# Patient Record
Sex: FEMALE | Race: WHITE | NOT HISPANIC OR LATINO | Employment: FULL TIME | URBAN - NONMETROPOLITAN AREA
[De-identification: names, ages, dates, MRNs, and addresses within clinical notes are randomized per-mention and may not be internally consistent; named-entity substitution may affect disease eponyms.]

---

## 2017-01-08 ENCOUNTER — HISTORY (OUTPATIENT)
Dept: FAMILY MEDICINE | Facility: OTHER | Age: 37
End: 2017-01-08

## 2017-01-08 ENCOUNTER — OFFICE VISIT - GICH (OUTPATIENT)
Dept: FAMILY MEDICINE | Facility: OTHER | Age: 37
End: 2017-01-08

## 2017-01-08 DIAGNOSIS — J06.9 ACUTE UPPER RESPIRATORY INFECTION: ICD-10-CM

## 2017-01-30 ENCOUNTER — COMMUNICATION - GICH (OUTPATIENT)
Dept: FAMILY MEDICINE | Facility: OTHER | Age: 37
End: 2017-01-30

## 2017-01-30 DIAGNOSIS — F41.1 GENERALIZED ANXIETY DISORDER: ICD-10-CM

## 2017-01-30 DIAGNOSIS — F33.0 MAJOR DEPRESSIVE DISORDER, RECURRENT, MILD (H): ICD-10-CM

## 2017-02-25 ENCOUNTER — COMMUNICATION - GICH (OUTPATIENT)
Dept: FAMILY MEDICINE | Facility: OTHER | Age: 37
End: 2017-02-25

## 2017-02-25 DIAGNOSIS — F41.1 GENERALIZED ANXIETY DISORDER: ICD-10-CM

## 2017-02-26 ENCOUNTER — COMMUNICATION - GICH (OUTPATIENT)
Dept: FAMILY MEDICINE | Facility: OTHER | Age: 37
End: 2017-02-26

## 2017-02-26 DIAGNOSIS — F41.1 GENERALIZED ANXIETY DISORDER: ICD-10-CM

## 2017-03-19 ENCOUNTER — HOSPITAL ENCOUNTER (OUTPATIENT)
Dept: RADIOLOGY | Facility: OTHER | Age: 37
End: 2017-03-19
Attending: NURSE PRACTITIONER

## 2017-03-19 ENCOUNTER — HISTORY (OUTPATIENT)
Dept: FAMILY MEDICINE | Facility: OTHER | Age: 37
End: 2017-03-19

## 2017-03-19 ENCOUNTER — OFFICE VISIT - GICH (OUTPATIENT)
Dept: FAMILY MEDICINE | Facility: OTHER | Age: 37
End: 2017-03-19

## 2017-03-19 DIAGNOSIS — S69.92XA UNSPECIFIED INJURY OF LEFT WRIST, HAND AND FINGER(S), INITIAL ENCOUNTER: ICD-10-CM

## 2017-03-19 DIAGNOSIS — S60.222A CONTUSION OF LEFT HAND: ICD-10-CM

## 2017-03-23 ENCOUNTER — COMMUNICATION - GICH (OUTPATIENT)
Dept: FAMILY MEDICINE | Facility: OTHER | Age: 37
End: 2017-03-23

## 2017-03-23 DIAGNOSIS — M17.0 PRIMARY OSTEOARTHRITIS OF BOTH KNEES: ICD-10-CM

## 2017-03-25 ENCOUNTER — COMMUNICATION - GICH (OUTPATIENT)
Dept: FAMILY MEDICINE | Facility: OTHER | Age: 37
End: 2017-03-25

## 2017-03-25 DIAGNOSIS — J30.89 OTHER ALLERGIC RHINITIS: ICD-10-CM

## 2017-03-31 ENCOUNTER — HISTORY (OUTPATIENT)
Dept: FAMILY MEDICINE | Facility: OTHER | Age: 37
End: 2017-03-31

## 2017-03-31 ENCOUNTER — OFFICE VISIT - GICH (OUTPATIENT)
Dept: FAMILY MEDICINE | Facility: OTHER | Age: 37
End: 2017-03-31

## 2017-03-31 DIAGNOSIS — E03.8 OTHER SPECIFIED HYPOTHYROIDISM: ICD-10-CM

## 2017-03-31 DIAGNOSIS — M17.0 PRIMARY OSTEOARTHRITIS OF BOTH KNEES: ICD-10-CM

## 2017-03-31 DIAGNOSIS — I10 ESSENTIAL (PRIMARY) HYPERTENSION: ICD-10-CM

## 2017-03-31 DIAGNOSIS — F41.1 GENERALIZED ANXIETY DISORDER: ICD-10-CM

## 2017-03-31 LAB
A/G RATIO - HISTORICAL: 1.7 (ref 1–2)
ALBUMIN SERPL-MCNC: 4.1 G/DL (ref 3.5–5.7)
ALP SERPL-CCNC: 96 IU/L (ref 34–104)
ALT (SGPT) - HISTORICAL: 14 IU/L (ref 7–52)
ANION GAP - HISTORICAL: 10 (ref 5–18)
AST SERPL-CCNC: 16 IU/L (ref 13–39)
BILIRUB SERPL-MCNC: 0.5 MG/DL (ref 0.3–1)
BUN SERPL-MCNC: 11 MG/DL (ref 7–25)
BUN/CREAT RATIO - HISTORICAL: 18
CALCIUM SERPL-MCNC: 9 MG/DL (ref 8.6–10.3)
CHLORIDE SERPLBLD-SCNC: 103 MMOL/L (ref 98–107)
CO2 SERPL-SCNC: 24 MMOL/L (ref 21–31)
CREAT SERPL-MCNC: 0.62 MG/DL (ref 0.7–1.3)
GFR IF NOT AFRICAN AMERICAN - HISTORICAL: >60 ML/MIN/1.73M2
GLOBULIN - HISTORICAL: 2.4 G/DL (ref 2–3.7)
GLUCOSE SERPL-MCNC: 93 MG/DL (ref 70–105)
POTASSIUM SERPL-SCNC: 4.2 MMOL/L (ref 3.5–5.1)
PROT SERPL-MCNC: 6.5 G/DL (ref 6.4–8.9)
SODIUM SERPL-SCNC: 137 MMOL/L (ref 133–143)
TSH - HISTORICAL: 1.52 UIU/ML (ref 0.34–5.6)

## 2017-03-31 ASSESSMENT — ANXIETY QUESTIONNAIRES
1. FEELING NERVOUS, ANXIOUS, OR ON EDGE: NEARLY EVERY DAY
4. TROUBLE RELAXING: NEARLY EVERY DAY
3. WORRYING TOO MUCH ABOUT DIFFERENT THINGS: NEARLY EVERY DAY
5. BEING SO RESTLESS THAT IT IS HARD TO SIT STILL: NEARLY EVERY DAY
7. FEELING AFRAID AS IF SOMETHING AWFUL MIGHT HAPPEN: NEARLY EVERY DAY
2. NOT BEING ABLE TO STOP OR CONTROL WORRYING: NEARLY EVERY DAY
6. BECOMING EASILY ANNOYED OR IRRITABLE: NEARLY EVERY DAY
GAD7 TOTAL SCORE: 21

## 2017-04-04 ENCOUNTER — COMMUNICATION - GICH (OUTPATIENT)
Dept: FAMILY MEDICINE | Facility: OTHER | Age: 37
End: 2017-04-04

## 2017-04-07 ENCOUNTER — COMMUNICATION - GICH (OUTPATIENT)
Dept: FAMILY MEDICINE | Facility: OTHER | Age: 37
End: 2017-04-07

## 2017-04-09 ENCOUNTER — COMMUNICATION - GICH (OUTPATIENT)
Dept: FAMILY MEDICINE | Facility: OTHER | Age: 37
End: 2017-04-09

## 2017-04-09 DIAGNOSIS — F41.1 GENERALIZED ANXIETY DISORDER: ICD-10-CM

## 2017-04-09 DIAGNOSIS — F33.0 MAJOR DEPRESSIVE DISORDER, RECURRENT, MILD (H): ICD-10-CM

## 2017-04-17 ENCOUNTER — HISTORY (OUTPATIENT)
Dept: FAMILY MEDICINE | Facility: OTHER | Age: 37
End: 2017-04-17

## 2017-04-17 ENCOUNTER — OFFICE VISIT - GICH (OUTPATIENT)
Dept: FAMILY MEDICINE | Facility: OTHER | Age: 37
End: 2017-04-17

## 2017-04-17 ENCOUNTER — HOSPITAL ENCOUNTER (OUTPATIENT)
Dept: RADIOLOGY | Facility: OTHER | Age: 37
End: 2017-04-17
Attending: NURSE PRACTITIONER

## 2017-04-17 ENCOUNTER — COMMUNICATION - GICH (OUTPATIENT)
Dept: FAMILY MEDICINE | Facility: OTHER | Age: 37
End: 2017-04-17

## 2017-04-17 DIAGNOSIS — R07.81 PLEURODYNIA: ICD-10-CM

## 2017-04-17 DIAGNOSIS — S20.219A CONTUSION OF FRONT WALL OF THORAX: ICD-10-CM

## 2017-05-10 ENCOUNTER — COMMUNICATION - GICH (OUTPATIENT)
Dept: FAMILY MEDICINE | Facility: OTHER | Age: 37
End: 2017-05-10

## 2017-05-10 DIAGNOSIS — E03.9 HYPOTHYROIDISM: ICD-10-CM

## 2017-06-08 ENCOUNTER — AMBULATORY - GICH (OUTPATIENT)
Dept: FAMILY MEDICINE | Facility: OTHER | Age: 37
End: 2017-06-08

## 2017-06-08 DIAGNOSIS — Z11.1 ENCOUNTER FOR SCREENING FOR RESPIRATORY TUBERCULOSIS: ICD-10-CM

## 2017-06-29 ENCOUNTER — COMMUNICATION - GICH (OUTPATIENT)
Dept: FAMILY MEDICINE | Facility: OTHER | Age: 37
End: 2017-06-29

## 2017-06-29 DIAGNOSIS — F33.0 MAJOR DEPRESSIVE DISORDER, RECURRENT, MILD (H): ICD-10-CM

## 2017-06-29 DIAGNOSIS — F41.1 GENERALIZED ANXIETY DISORDER: ICD-10-CM

## 2017-07-21 ENCOUNTER — HISTORY (OUTPATIENT)
Dept: FAMILY MEDICINE | Facility: OTHER | Age: 37
End: 2017-07-21

## 2017-07-21 ENCOUNTER — OFFICE VISIT - GICH (OUTPATIENT)
Dept: FAMILY MEDICINE | Facility: OTHER | Age: 37
End: 2017-07-21

## 2017-07-21 DIAGNOSIS — F33.0 MAJOR DEPRESSIVE DISORDER, RECURRENT, MILD (H): ICD-10-CM

## 2017-07-21 DIAGNOSIS — F41.1 GENERALIZED ANXIETY DISORDER: ICD-10-CM

## 2017-07-21 DIAGNOSIS — L71.9 ROSACEA: ICD-10-CM

## 2017-07-21 ASSESSMENT — ANXIETY QUESTIONNAIRES
7. FEELING AFRAID AS IF SOMETHING AWFUL MIGHT HAPPEN: SEVERAL DAYS
5. BEING SO RESTLESS THAT IT IS HARD TO SIT STILL: NOT AT ALL
2. NOT BEING ABLE TO STOP OR CONTROL WORRYING: SEVERAL DAYS
1. FEELING NERVOUS, ANXIOUS, OR ON EDGE: SEVERAL DAYS
6. BECOMING EASILY ANNOYED OR IRRITABLE: MORE THAN HALF THE DAYS
3. WORRYING TOO MUCH ABOUT DIFFERENT THINGS: SEVERAL DAYS
4. TROUBLE RELAXING: SEVERAL DAYS
GAD7 TOTAL SCORE: 7

## 2017-09-15 ENCOUNTER — COMMUNICATION - GICH (OUTPATIENT)
Dept: FAMILY MEDICINE | Facility: OTHER | Age: 37
End: 2017-09-15

## 2017-09-15 DIAGNOSIS — F41.1 GENERALIZED ANXIETY DISORDER: ICD-10-CM

## 2017-09-19 ENCOUNTER — COMMUNICATION - GICH (OUTPATIENT)
Dept: FAMILY MEDICINE | Facility: OTHER | Age: 37
End: 2017-09-19

## 2017-09-19 DIAGNOSIS — M17.0 PRIMARY OSTEOARTHRITIS OF BOTH KNEES: ICD-10-CM

## 2017-09-20 ENCOUNTER — COMMUNICATION - GICH (OUTPATIENT)
Dept: FAMILY MEDICINE | Facility: OTHER | Age: 37
End: 2017-09-20

## 2017-09-20 DIAGNOSIS — I10 ESSENTIAL (PRIMARY) HYPERTENSION: ICD-10-CM

## 2017-09-27 ENCOUNTER — COMMUNICATION - GICH (OUTPATIENT)
Dept: FAMILY MEDICINE | Facility: OTHER | Age: 37
End: 2017-09-27

## 2017-09-27 DIAGNOSIS — R09.89 OTHER SPECIFIED SYMPTOMS AND SIGNS INVOLVING THE CIRCULATORY AND RESPIRATORY SYSTEMS: ICD-10-CM

## 2017-09-27 DIAGNOSIS — M17.0 PRIMARY OSTEOARTHRITIS OF BOTH KNEES: ICD-10-CM

## 2017-10-02 ENCOUNTER — COMMUNICATION - GICH (OUTPATIENT)
Dept: FAMILY MEDICINE | Facility: OTHER | Age: 37
End: 2017-10-02

## 2017-10-13 ENCOUNTER — HOSPITAL ENCOUNTER (OUTPATIENT)
Dept: RADIOLOGY | Facility: OTHER | Age: 37
End: 2017-10-13
Attending: FAMILY MEDICINE

## 2017-10-13 ENCOUNTER — HISTORY (OUTPATIENT)
Dept: FAMILY MEDICINE | Facility: OTHER | Age: 37
End: 2017-10-13

## 2017-10-13 ENCOUNTER — OFFICE VISIT - GICH (OUTPATIENT)
Dept: FAMILY MEDICINE | Facility: OTHER | Age: 37
End: 2017-10-13

## 2017-10-13 DIAGNOSIS — M54.16 RADICULOPATHY OF LUMBAR REGION: ICD-10-CM

## 2017-10-13 DIAGNOSIS — M17.0 PRIMARY OSTEOARTHRITIS OF BOTH KNEES: ICD-10-CM

## 2017-10-13 DIAGNOSIS — F41.1 GENERALIZED ANXIETY DISORDER: ICD-10-CM

## 2017-10-16 ENCOUNTER — HOSPITAL ENCOUNTER (OUTPATIENT)
Dept: RADIOLOGY | Facility: OTHER | Age: 37
End: 2017-10-16
Attending: FAMILY MEDICINE

## 2017-10-16 ENCOUNTER — COMMUNICATION - GICH (OUTPATIENT)
Dept: FAMILY MEDICINE | Facility: OTHER | Age: 37
End: 2017-10-16

## 2017-10-16 DIAGNOSIS — M54.16 RADICULOPATHY OF LUMBAR REGION: ICD-10-CM

## 2017-10-24 ENCOUNTER — COMMUNICATION - GICH (OUTPATIENT)
Dept: FAMILY MEDICINE | Facility: OTHER | Age: 37
End: 2017-10-24

## 2017-11-13 ENCOUNTER — OFFICE VISIT - GICH (OUTPATIENT)
Dept: FAMILY MEDICINE | Facility: OTHER | Age: 37
End: 2017-11-13

## 2017-11-13 ENCOUNTER — HISTORY (OUTPATIENT)
Dept: FAMILY MEDICINE | Facility: OTHER | Age: 37
End: 2017-11-13

## 2017-11-13 DIAGNOSIS — J20.9 ACUTE BRONCHITIS: ICD-10-CM

## 2017-11-13 ASSESSMENT — ANXIETY QUESTIONNAIRES
GAD7 TOTAL SCORE: 7
5. BEING SO RESTLESS THAT IT IS HARD TO SIT STILL: NOT AT ALL
3. WORRYING TOO MUCH ABOUT DIFFERENT THINGS: SEVERAL DAYS
6. BECOMING EASILY ANNOYED OR IRRITABLE: MORE THAN HALF THE DAYS
2. NOT BEING ABLE TO STOP OR CONTROL WORRYING: SEVERAL DAYS
7. FEELING AFRAID AS IF SOMETHING AWFUL MIGHT HAPPEN: SEVERAL DAYS
1. FEELING NERVOUS, ANXIOUS, OR ON EDGE: SEVERAL DAYS
4. TROUBLE RELAXING: SEVERAL DAYS

## 2017-11-13 ASSESSMENT — PATIENT HEALTH QUESTIONNAIRE - PHQ9: SUM OF ALL RESPONSES TO PHQ QUESTIONS 1-9: 7

## 2017-11-16 ENCOUNTER — COMMUNICATION - GICH (OUTPATIENT)
Dept: FAMILY MEDICINE | Facility: OTHER | Age: 37
End: 2017-11-16

## 2017-11-16 DIAGNOSIS — E53.8 DEFICIENCY OF OTHER SPECIFIED B GROUP VITAMINS (CODE): ICD-10-CM

## 2017-12-22 ENCOUNTER — COMMUNICATION - GICH (OUTPATIENT)
Dept: FAMILY MEDICINE | Facility: OTHER | Age: 37
End: 2017-12-22

## 2017-12-27 NOTE — PROGRESS NOTES
Patient Information     Patient Name MRN Laverne Harrison [id number] Female 1980      Progress Notes by Alin Pimentel MD at 10/13/2017  2:30 PM     Author:  Alin Pimentel MD Service:  (none) Author Type:  Physician     Filed:  10/13/2017  3:38 PM Encounter Date:  10/13/2017 Status:  Signed     :  Alin Pimentel MD (Physician)            SUBJECTIVE:    Laverne Nguyen is a 37 y.o. female who presents for follow up back pain and anxiety    HPI    Pain is severe in lumbar spine today.  For the last 6 months she has had intermittent numbness in her right foot with flexion of spine.  Pinching in right buttock today.  Lifting now in her job, and more pain.  Cannot sleep some nights due to the back pain.  Sleeping on the couch a lot more.  Uses the ultram daily.  Usually 2 int he AM and one in the evening.  Feels this is not enough.  Has had to take just one daily until today, as she was running out.  Is upset she had to come in for the refill.  Has never had an MRI of her back.  Last x-ray was .  Feels like I abandoned her since I did not refill the medications.  In looking over the chart, last 6 month prescription with me was 3/31/17, so was not due until 2 weeks ago.    Lots of stress over the summer.  Grandma .  Step daughter was in 3 different foster homes.  Now in her 4th.  Arguing more with her  over her cutting herself and damaging her home.  He did not believe that the daughter needed more help.      Allergies      Allergen   Reactions     Amoxicillin  Other - Describe In Comment Field     Doesn't work for patient     ,   Current Outpatient Prescriptions on File Prior to Visit       Medication  Sig Dispense Refill     cetirizine (ZYRTEC) 10 mg tablet TAKE 1 TABLET BY MOUTH TWICE DAILY. 180 tablet 1     cholecalciferol (VITAMIN D-3) 2,000 unit capsule 1 tablet daily  0     clonazePAM (KLONOPIN) 1 mg tablet Take 1 tablet by mouth at bedtime. 90 tablet 1     cyanocobalamin  "(VITAMIN B12) 1,000 mcg/mL injection Inject 1 mL intramuscular every 4 weeks. 1 mL 11     EPINEPHrine (EPIPEN) 0.3 mg/0.3 mL injection Inject 0.3 mg intramuscular one time if needed for Allergic Reaction for 1 dose. 2 Each 3     escitalopram oxalate (LEXAPRO) 20 mg tablet Take 1 tablet by mouth once daily. 90 tablet 3     levothyroxine (SYNTHROID) 88 mcg tablet TAKE 1 TABLET BY MOUTH ONCE DAILY. 90 tablet 2     lisinopril-hydrochlorothiazide 20-12.5 mg tablet (PRINZIDE) TAKE 1 TABLET BY MOUTH EVERY DAY 90 tablet 1     metroNIDAZOLE (METROGEL) 1 % gel Apply  topically to affected area(s) once daily. 60 g 3     montelukast (SINGULAIR) 10 mg tablet TAKE 1 TABLET BY MOUTH AT BEDTIME 90 tablet 2     multivitamin (MVI) tablet Take 1 tablet by mouth once daily.  0     Syringe with Needle, Disp, (MEDSAVER SYRINGE 3CC/25GX1) 3 mL 25 x 1\" Syrg As directed. 1 Syringe 11     traZODone (DESYREL) 100 mg tablet Take 1 tablet by mouth at bedtime. 90 tablet 3     No current facility-administered medications on file prior to visit.    ,   Past Medical History:     Diagnosis  Date     Cannabis abuse      use found on urine drug screen      Chronic low back pain      Depression with anxiety      ear infections     Recurrent      Gastroesophageal reflux disease      Healthcare maintenance      History of pregnancy      I, para 1.      Hypertension      Morbid obesity (HC) 07    with a body mass index of 51      Tobacco abuse     and   Past Surgical History:      Procedure  Laterality Date     ADENOIDECTOMY       Bilateral tympanostomy       Gastric bypass procedure  07     for obesity, Dr. Hogue, [place]       LEG/ANKLE SURGERY Right      [facility] COLONOSCOPY REMOVE DOMI POLYP LESN HOT BPSY FORCE  1/10/2014    hyperplastic       [facility] CREATE EARDRUM OPENING GEN ANESTH  10/86     [facility] CREATE EARDRUM OPENING GEN ANESTH       Right tympanoplasty       with left PE tube       TUBAL LIGATION   "       REVIEW OF SYSTEMS:  Review of Systems   Constitutional: Negative for chills and fever.   Musculoskeletal: Positive for back pain and joint pain.   Neurological: Positive for headaches.   Endo/Heme/Allergies: Does not bruise/bleed easily.   Psychiatric/Behavioral: Positive for depression. Negative for substance abuse. The patient is nervous/anxious.        OBJECTIVE:  /84  Pulse 80  Wt 119.3 kg (263 lb)  BMI 43.77 kg/m2    EXAM:   Physical Exam   Constitutional: She is oriented to person, place, and time.   Tearful and confrontational at first, but then normalized as I asked more questions.   HENT:   Head: Normocephalic and atraumatic.   Musculoskeletal:   Normal gait and station.  Able to easily get on the exam table.  No lumbar pain on palpitation.  Neg straight leg raise.  Normal lower extremity strength.     Neurological: She is alert and oriented to person, place, and time.   Psychiatric: Memory, affect and judgment normal.     X-ray shows mild facet degeneration at L5/S1    ASSESSMENT/PLAN:    ICD-10-CM    1. Chronic lumbar radiculopathy M54.16 XR SPINE LUMBAR 3 VIEWS      MR SPINE LUMBAR WO   2. Generalized anxiety disorder F41.1    3. Primary osteoarthritis of both knees M17.0 traMADol (ULTRAM) 50 mg tablet        Plan:  Has had back pain now for years, but reporting radicular symptoms so an MRI is indicated.  Discussed with her indications for surgery, primarily michael weakness, she currently would not be expected to need surgery.  Pain management goals would be weight loss, activity, core strengthening.      Regarding her anxiety, I advised she resume counseling, but also offered her a different SSIR.  She does not want to risk the new one being less effective than her lexapro.  Follow up with me on anxiety as needed.  6 months on the back.    Alin Pimentel MD ....................  10/13/2017   3:37 PM

## 2017-12-27 NOTE — PROGRESS NOTES
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  Contact their Primary Care Provider for further instructions.     Patient History  Previous TB: no  Previous positive PPD: no  History of 'live' vaccines such as MMRV or Proquad (Measles, Mumps, Rubella, Varicella), Shingles (Zostavax), Intranasal flu (FLumist, LAIV), Rotavirus (Rotateq) in the last 4 weeks: no  HIV or immunocompromised: no  Significant weight loss (10 lbs or greater) in the past 6 months: no  Currently or recently experiencing night sweats: no  Currently experiencing a persistent cough: no Pt will have read by her employer at [employer]    The patient has been informed to return to this clinic within 48-72 hours to read PPD.  Above information obtained by: Filomena VELÁSQUEZ

## 2017-12-28 NOTE — TELEPHONE ENCOUNTER
Patient Information     Patient Name MRN Laverne Harrison [id number] Female 1980      Telephone Encounter by Luz Marina Ross RN at 10/2/2017 11:26 AM     Author:  Luz Marina Ross RN Service:  (none) Author Type:  NURS- Registered Nurse     Filed:  10/2/2017 11:31 AM Encounter Date:  10/2/2017 Status:  Signed     :  Luz Marina Ross RN (NURS- Registered Nurse)            I called pt (Before I saw Sveta had called) to inform her of 's notes.  She used expletives stating she has to have two knee replacements and she isn't able to take anything else.  I tried to explain that the diagnosis and medication need to be addressed and she hung up on me.  Luz Marina Ross RN ....................  10/2/2017   11:30 AM

## 2017-12-28 NOTE — TELEPHONE ENCOUNTER
Patient Information     Patient Name MRN Laverne Harrison [id number] Female 1980      Telephone Encounter by Chayito Clifton RN at 7/3/2017  2:26 PM     Author:  Chayito Clifton RN Service:  (none) Author Type:  NURS- Registered Nurse     Filed:  7/3/2017  2:33 PM Encounter Date:  2017 Status:  Signed     :  Chayito Clifton RN (NURS- Registered Nurse)            Patient was to follow up in April. Limited refill and letter/MyChart message sent for reminder.    Depression-in adults 18 and over  SSRI    Office visit in the past 12 months or as indicated in chart.  Should have clinic visit 1-2 months after initial prescription.    Last visit with JOSE BERUMEN was on: 2017 in [facility]  Next visit with JOSE BERUMEN is on: No future appointment listed with this provider  Next visit with Family Practice is on: No future appointment listed in this department    Max refills 12 months from last office visit or per providers notes.    PHQ Depression Screening 3/31/2017 2017   Date of PHQ exam (doc flow) 3/31/2017 -   1. Lack of interest/pleasure 0 - Not at all 0 - Not at all   2. Feeling down/depressed 0 - Not at all 0 - Not at all   PHQ-2 TOTAL SCORE 0 0   3. Trouble sleeping - -   4. Decreased energy - -   5. Appetite change - -   6. Feelings of failure - -   7. Trouble concentrating - -   8. Activity level - -   9. Hurting yourself - -   PHQ-9 TOTAL SCORE - -   PHQ-9 Severity Level - -   Functional Impairment - -      TERESA-7 ANXIETY SCREENING 2016 3/31/2017   TERESA date (doc flow) 2016 3/31/2017   Nervous, anxious 2 3   Cannot stop worrying 2 3   Worry about different things 2 3   Cannot relax 2 3   Feeling restless 2 3   Easily annoyed/irritated 3 3   Afraid of awful event 2 3   Score 15 21   Severity severe anxiety severe anxiety       Prescription refilled per RN Medication Refill Policy.................... Chayito Clifton RN  ....................  7/3/2017   2:30 PM

## 2017-12-28 NOTE — TELEPHONE ENCOUNTER
Patient Information     Patient Name MRN Laverne Harrison [id number] Female 1980      Telephone Encounter by Vince Chew at 10/16/2017  9:18 AM     Author:  Vince Chew Service:  (none) Author Type:  (none)     Filed:  10/16/2017  9:27 AM Encounter Date:  10/16/2017 Status:  Signed     :  Vince Chew            After birth date was verified, spoke with Laverne and let her know that her prescription for Tramadol had been faxed in. Patient was very upset, as she stated that she went the entire weekend without her Tramadol and is now in so much pain that she can hardly tolerate it. Patient requesting medication for breakthrough pain.    If applicable, please prescribe a pain medication. [facility]acy is [facility] ....................  10/16/2017   9:24 AM

## 2017-12-28 NOTE — ADDENDUM NOTE
Patient Information     Patient Name MRN Laverne Harrison [id number] Female 1980      Addendum Note by Alin Pimentel MD at 2017  3:46 PM     Author:  Alin Pimentel MD Service:  (none) Author Type:  Physician     Filed:  2017  3:46 PM Encounter Date:  2017 Status:  Signed     :  Alin Pimentel MD (Physician)       Addended by: ALIN PIMENTEL on: 2017 03:46 PM        Modules accepted: Orders

## 2017-12-28 NOTE — TELEPHONE ENCOUNTER
Patient Information     Patient Name MRN Laverne Harrison [id number] Female 1980      Telephone Encounter by Sveta Melendez at 10/2/2017 10:56 AM     Author:  Sveta Melendez Service:  (none) Author Type:  (none)     Filed:  10/2/2017 11:21 AM Encounter Date:  10/2/2017 Status:  Signed     :  Sveta Melendez            Notified patient of providers note. She is very upset that she was just in on  and this medication was not refilled. She told me that Dr. Pimentel WILL BE refilling half of the prescription because no doctor should do this to a patient. She states that she will be switching doctors. I let her know that I will get this message to Dr. Pimentel and she hung up on me. She was very demanding and rude.  Sveta Siu ....................  10/2/2017   11:00 AM

## 2017-12-28 NOTE — TELEPHONE ENCOUNTER
Patient Information     Patient Name MRN Laverne Harrison [id number] Female 1980      Telephone Encounter by Luz Marina Ross RN at 2017  9:00 AM     Author:  Luz Marina Ross RN Service:  (none) Author Type:  NURS- Registered Nurse     Filed:  2017  9:05 AM Encounter Date:  2017 Status:  Signed     :  Luz Marina Ross RN (NURS- Registered Nurse)            Unable to fill RX today.  Can be filled .    This is a Refill request from: [facility]  Name of Medication: Tramadol  Quantity requested: 90  Last fill date: 2017 #90 R5  Due for refill:   Last visit with ALIN PIMENTEL was on: 2017 in [facility]  PCP:  Alin Pimentel MD  Controlled Substance Agreement: 2016  Diagnosis r/t this medication request: osteoarthritis      Unable to complete prescription refill per RN Medication Refill Policy.................... Luz Marina Ross RN ....................  2017   9:00 AM

## 2017-12-28 NOTE — TELEPHONE ENCOUNTER
Patient Information     Patient Name MRN Laverne Harrison [id number] Female 1980      Telephone Encounter by Luz Marina Ross RN at 2017  3:45 PM     Author:  Luz Marina Ross RN Service:  (none) Author Type:  NURS- Registered Nurse     Filed:  2017  4:12 PM Encounter Date:  2017 Status:  Signed     :  Luz Marina Ross RN (NURS- Registered Nurse)            This is a Refill request from: [facility]  Name of Medication: Tramadol  Quantity requested: 90  Last fill date: 2017  Due for refill: yes  Last visit with ALIN PIMENTEL was on: 2017 in [facility]  PCP:  Alin Pimentel MD  Controlled Substance Agreement: 2017  Diagnosis r/t this medication request: Osteoarthritis      Unable to complete prescription refill per RN Medication Refill Policy.................... Luz Marina Ross RN ....................  2017   4:09 PM    Asthma/COPD    Office visit in the past 12 months.    Last visit with AILN PIMENTEL was on: 2017 in [facility]  Next visit with ALIN PIMENTEL is on: No future appointment listed with this provider  Next visit with Family Practice is on: No future appointment listed in this department    Max refills 12 months from last office visit.

## 2017-12-28 NOTE — PROGRESS NOTES
Patient Information     Patient Name MRN Sex Laverne Jeff [id number] Female 1980      Progress Notes by Ange Funes PA-C at 2017  2:15 PM     Author:  Ange Funes PA-C Service:  (none) Author Type:  PHYS- Physician Assistant     Filed:  2017 10:22 AM Encounter Date:  2017 Status:  Signed     :  Ange Funes PA-C (PHYS- Physician Assistant)            Nursing Notes:   Cristina Luna  2017  2:32 PM  Signed  Patient presents to the clinic for cough and chest congestion that she states that she has had for five days now.  Cristina Luna LPN........................2017  2:17 PM      HPI:    Laverne Nguyen is a 37 y.o. female who presents for cough, chest congestion x 5 weeks. Tx with sinus infection a few weeks ago at [facility]. Not getting better. Drained. Worse last 5 days. Some phlegm. Chest congestion - radiates to back with coughing. Tired. Head is full. Was put on augmentin.  Not sleeping well. No fevers or chills. No ear pain or sore throat. Congestion. No GI symptoms. Eating and drinking normally.    Past Medical History:     Diagnosis  Date     Cannabis abuse      use found on urine drug screen      Chronic low back pain      Depression with anxiety      ear infections     Recurrent      Gastroesophageal reflux disease      Healthcare maintenance      History of pregnancy      I, para 1.      Hypertension      Morbid obesity (HC) 07    with a body mass index of 51      Tobacco abuse        Past Surgical History:      Procedure  Laterality Date     ADENOIDECTOMY       Bilateral tympanostomy       Gastric bypass procedure  07     for obesity, [place] [place]       LEG/ANKLE SURGERY Right      [facility] COLONOSCOPY REMOVE DOMI POLYP LESN HOT BPSY FORCE  1/10/2014    hyperplastic       [place] CREATE EARDRUM OPENING GEN ANESTH  10/86     [place] CREATE EARDRUM OPENING GEN ANESTH       Right tympanoplasty       with  "left PE tube       TUBAL LIGATION  2009       Social History        Substance Use Topics          Smoking status:   Current Some Day Smoker      Packs/day:  0.25      Types:  Cigarettes      Smokeless tobacco:   Never Used       Comment: 5 CIGARETTES A WEEK        Alcohol use   Yes      Comment: occasionally 2/month         Current Outpatient Prescriptions       Medication  Sig Dispense Refill     busPIRone (BUSPAR) 10 mg tablet TK 1 T PO TID PRF ANXIETY.  11     cetirizine (ZYRTEC) 10 mg tablet TAKE 1 TABLET BY MOUTH TWICE DAILY. 180 tablet 1     cholecalciferol (VITAMIN D-3) 2,000 unit capsule 1 tablet daily  0     clonazePAM (KLONOPIN) 1 mg tablet Take 1 tablet by mouth at bedtime. 90 tablet 1     cyanocobalamin (VITAMIN B12) 1,000 mcg/mL injection Inject 1 mL intramuscular every 4 weeks. 1 mL 11     EPINEPHrine (EPIPEN) 0.3 mg/0.3 mL injection Inject 0.3 mg intramuscular one time if needed for Allergic Reaction for 1 dose. 2 Each 3     escitalopram oxalate (LEXAPRO) 20 mg tablet Take 1 tablet by mouth once daily. 90 tablet 3     levothyroxine (SYNTHROID) 88 mcg tablet TAKE 1 TABLET BY MOUTH ONCE DAILY. 90 tablet 2     lisinopril-hydrochlorothiazide 20-12.5 mg tablet (PRINZIDE) TAKE 1 TABLET BY MOUTH EVERY DAY 90 tablet 1     metroNIDAZOLE (METROGEL) 1 % gel Apply  topically to affected area(s) once daily. 60 g 3     montelukast (SINGULAIR) 10 mg tablet TAKE 1 TABLET BY MOUTH AT BEDTIME 90 tablet 2     multivitamin (MVI) tablet Take 1 tablet by mouth once daily.  0     nicotine (COMMIT) 2 mg lozenge TK 1 LOZENGE PO PRF TOBACCO CESSATION  0     Syringe with Needle, Disp, (MEDSAVER SYRINGE 3CC/25GX1) 3 mL 25 x 1\" Syrg As directed. 1 Syringe 11     traMADol (ULTRAM) 50 mg tablet Take 1 tablet by mouth 3 times daily if needed. 90 tablet 5     traZODone (DESYREL) 100 mg tablet Take 1 tablet by mouth at bedtime. 90 tablet 3     No current facility-administered medications for this visit.      Medications have been " "reviewed by me and are current to the best of my knowledge and ability.      Allergies      Allergen   Reactions     Amoxicillin  Other - Describe In Comment Field     Doesn't work for patient         REVIEW OF SYSTEMS:  Refer to HPI.    EXAM:   Vitals:    /70  Pulse 72  Temp 98.4  F (36.9  C) (Tympanic)  Ht 1.657 m (5' 5.25\")  Wt 122.9 kg (271 lb)  LMP 10/25/2017  SpO2 97%  Breastfeeding? No  BMI 44.75 kg/m2    General Appearance: Pleasant, alert, appropriate appearance for age. No acute distress  Ear Exam: Normal TM's bilaterally, grey, translucent, bony landmarks appreciated.   Left/Right TM: Effusion is not present. TM is not bulging. There is no pus appreciated.    Normal auditory canals and external ears. Non-tender.   OroPharynx Exam:  Erythematous posterior pharynx with no exudates. No sinus pain upon palpation of frontal, ethmoid, and maxillary sinuses.  Chest/Respiratory Exam: Normal chest wall and respirations. Clear to auscultation. No retractions appreciated.  Cardiovascular Exam: Regular rate and rhythm. S1, S2, no murmur, click, gallop, or rubs.  Lymphatic Exam: ACLN.  Skin: no rash or abnormalities  Psychiatric Exam: Alert and oriented - appropriate affect.    PHQ Depression Screen  Date of PHQ exam: 17  Over the last 2 weeks, how often have you been bothered by any of the following problems?  1. Little interest or pleasure in doing things: 1 - Several days  2. Feeling down, depressed, or hopeless: 1 - Several days  3. Trouble falling or staying asleep, or sleeping too much: 2 - More than half the days  4. Feeling tired or having little energy: 2 - More than half the days  5. Poor appetite or overeatin - Not at all  6. Feeling bad about yourself - or that you are a failure or have let yourself or your family down: 1 - Several days  7. Trouble concentrating on things, such as reading the newspaper or watching television: 0 - Not at all  8. Moving or speaking so slowly that " other people could have noticed. Or the opposite - being so fidgety or restless that you have been moving around a lot more than usual: 0 - Not at all  9. Thoughts that you would be better off dead, or of hurting yourself in some way: 0 - Not at all    PHQ-9 TOTAL SCORE: 7  Depression Severity Level: mild  If any answers were positive, how difficult have these problems made it for you to do your work, take care of things at home, or get along with other people: somewhat difficult    LABS:    No results found for this visit on 11/13/17.    ASSESSMENT AND PLAN:      ICD-10-CM    1. Acute bronchitis, unspecified organism J20.9 azithromycin (ZITHROMAX) 250 mg tablet      codeine-guaiFENesin (CHERATUSSIN AC)  mg/5 mL liquid       Patient was started on azithromycin for acute bronchitis. Gave Cheratussin cough syrup for coughing symptoms.  Return for recheck in the next 1-2 weeks if symptoms are not resolving or worsening as needed. You need to complete a chest x-ray if symptoms are not starting to resolve to rule out concerns.    Patient Instructions   Antibiotic has been sent to pharmacy. Please take full course of antibiotic even if symptoms have completely resolved. This helps prevent against antibiotic resistance.     Patient prescribed antibiotics. Monitor for any fevers or chills. Return in 7-10 days if not feeling better. Please call clinic with any questions or concerns. Please take in a lot of fluids and get rest. Return with any change or worsening of symptoms.    May use symptomatic care with tylenol or ibuprofen. Sudafed or mucinex work well for congestion. May use cough syrup or cough drops.  Using a humidifier works well to break up the congestion. You can also sleep propped up on a couple pillows to decrease symptoms at night. A nettipot works well to decrease nasal congestion.    Use a Neti Pot/sinus flush (Phil Med Sinus Rinse) 3 times daily to irrigate sinuses/mucosal tissue.     Sudafed or  mucinex work well for congestion.   If you choose pseudoephedrine, use for only 5-7 days AS DIRECTED. Speak to your pharmacist if you have any concerns about your medications. May also use decongestant nasal spray, but only for 3 days MAXIMUM.    You will need to be evaluated if you start to experience:  Fever higher than 102.5 F (39.2 C)   Sudden and severe pain in the face and head   Trouble seeing or seeing double   Trouble thinking clearly   Swelling or redness around 1 or both eyes   Trouble breathing or a stiff neck    * If you are a smoker, try to quit *    Call 9-1-1 or go to the emergency room if you:  Have trouble breathing   Are drooling because you cannot swallow your saliva   Have swelling of the neck or tongue   Cannot move your neck or have trouble opening your mouth        Ange Funes PA-C..................11/13/2017 2:32 PM

## 2017-12-28 NOTE — TELEPHONE ENCOUNTER
Patient Information     Patient Name MRN Laverne Harrison [id number] Female 1980      Telephone Encounter by Alin Pimentel MD at 10/24/2017  4:34 PM     Author:  Alin Pimentel MD Service:  (none) Author Type:  Physician     Filed:  10/24/2017  4:34 PM Encounter Date:  10/24/2017 Status:  Signed     :  Alin Pimentel MD (Physician)            Actual complete report is on her MyChart. I released this 2 weeks ago.  Let her know she has arthritis changes throughout.  Can try injections of the facets if she wants.  Alin Pimentel MD ....................  10/24/2017   4:34 PM

## 2017-12-28 NOTE — TELEPHONE ENCOUNTER
Patient Information     Patient Name MRN Laverne Harrison [id number] Female 1980      Telephone Encounter by Alin Pimentel MD at 10/2/2017  8:03 AM     Author:  Alin Pimentel MD Service:  (none) Author Type:  Physician     Filed:  10/2/2017  8:03 AM Encounter Date:  2017 Status:  Signed     :  Alin Pimentel MD (Physician)            This needs follow up every 6 months.  Alin Pimentel MD ....................  10/2/2017   8:03 AM

## 2017-12-28 NOTE — PROGRESS NOTES
"Patient Information     Patient Name MRN Laverne Harrison [id number] Female 1980      Progress Notes by Alin Pimentel MD at 2017  3:00 PM     Author:  Alin Pimentel MD Service:  (none) Author Type:  Physician     Filed:  2017  3:43 PM Encounter Date:  2017 Status:  Signed     :  Alin Pimentel MD (Physician)            SUBJECTIVE:    Laverne Nguyen is a 37 y.o. female who presents for med follow up     HPI    She is now working at [employer], no longer at [employer] and has significant less anxiety and depression with the change.   No significant med sdie effects and wants a refill.    Also a red rash around her nose.  Small red lumps. Tender off and on.  Has tried triple antibiotics, vaseline.      Allergies      Allergen   Reactions     Amoxicillin  Other - Describe In Comment Field     Doesn't work for patient     ,   Current Outpatient Prescriptions on File Prior to Visit       Medication  Sig Dispense Refill     cetirizine (ZYRTEC) 10 mg tablet TAKE 1 TABLET BY MOUTH TWICE DAILY. 180 tablet 1     cholecalciferol (VITAMIN D-3) 2,000 unit capsule 1 tablet daily  0     clonazePAM (KLONOPIN) 1 mg tablet TAKE 1 TABLET BY MOUTH AT BEDTIME 90 tablet 1     cyanocobalamin (VITAMIN B12) 1,000 mcg/mL injection Inject 1 mL intramuscular every 4 weeks. 1 mL 11     EPINEPHrine (EPIPEN) 0.3 mg/0.3 mL injection Inject 0.3 mg intramuscular one time if needed for Allergic Reaction for 1 dose. 2 Each 3     levothyroxine (SYNTHROID) 88 mcg tablet TAKE 1 TABLET BY MOUTH ONCE DAILY. 90 tablet 2     lisinopril-hydrochlorothiazide 20-12.5 mg tablet (PRINIZIDE) TAKE 1 TABLET BY MOUTH EVERY DAY 90 tablet 3     montelukast (SINGULAIR) 10 mg tablet TAKE 1 TABLET BY MOUTH AT BEDTIME 90 tablet 2     multivitamin (MVI) tablet Take 1 tablet by mouth once daily.  0     Syringe with Needle, Disp, (MEDSAVER SYRINGE 3CC/25GX1) 3 mL 25 x 1\" Syrg As directed. 1 Syringe 11     traMADol (ULTRAM) 50 mg " tablet Take 1 tablet by mouth 3 times daily if needed. 90 tablet 5     traZODone (DESYREL) 100 mg tablet Take 1 tablet by mouth at bedtime. 90 tablet 3     No current facility-administered medications on file prior to visit.    ,   Past Medical History:     Diagnosis  Date     Cannabis abuse      use found on urine drug screen      Chronic low back pain      Depression with anxiety      ear infections     Recurrent      Gastroesophageal reflux disease      Healthcare maintenance      History of pregnancy      I, para 1.      Hypertension 2004     Morbid obesity (HC) 07    with a body mass index of 51      Tobacco abuse     and   Past Surgical History:      Procedure  Laterality Date     ADENOIDECTOMY       Bilateral tympanostomy       Gastric bypass procedure  07     for obesity, [facility] [place]       LEG/ANKLE SURGERY Right      MN COLONOSCOPY REMOVE DOMI POLYP LESN HOT BPSY FORCEP  1/10/2014    hyperplastic       MN CREATE EARDRUM OPENING GEN ANESTH  10/86     MN CREATE EARDRUM OPENING GEN ANESTH       Right tympanoplasty       with left PE tube       TUBAL LIGATION         REVIEW OF SYSTEMS:  Review of Systems   Constitutional: Negative for weight loss.   Respiratory: Negative for shortness of breath.    Cardiovascular: Negative for chest pain.   Skin: Positive for rash. Negative for itching.   Neurological: Positive for headaches.   Psychiatric/Behavioral: The patient is not nervous/anxious and does not have insomnia.        OBJECTIVE:  /70  Resp 16  Wt 116.7 kg (257 lb 3.2 oz)  BMI 42.8 kg/m2    EXAM:   Physical Exam   Constitutional: She is oriented to person, place, and time and well-developed, well-nourished, and in no distress. No distress.   HENT:   Head: Normocephalic and atraumatic.   Right Ear: External ear normal.   Left Ear: External ear normal.   Neurological: She is alert and oriented to person, place, and time.   Skin: She is not  diaphoretic.   Red patch under nose into nasolabial folds with several small papules within the red area.   Psychiatric: Memory, affect and judgment normal.       PHQ Depression Screening 4/17/2017 7/21/2017   Date of PHQ exam (doc flow) - 7/21/2017   1. Lack of interest/pleasure 0 - Not at all 0 - Not at all   2. Feeling down/depressed 0 - Not at all 0 - Not at all   PHQ-2 TOTAL SCORE 0 0   3. Trouble sleeping - -   4. Decreased energy - -   5. Appetite change - -   6. Feelings of failure - -   7. Trouble concentrating - -   8. Activity level - -   9. Hurting yourself - -   PHQ-9 TOTAL SCORE - -   PHQ-9 Severity Level - -   Functional Impairment - -   Some recent data might be hidden       TERESA-7 ANXIETY SCREENING 3/31/2017 7/21/2017   TERESA date (doc flow) 3/31/2017 7/21/2017   Nervous, anxious 3 1   Cannot stop worrying 3 1   Worry about different things 3 1   Cannot relax 3 1   Feeling restless 3 0   Easily annoyed/irritated 3 2   Afraid of awful event 3 1   Score 21 7   Severity severe anxiety mild anxiety   Some recent data might be hidden           ASSESSMENT/PLAN:    ICD-10-CM    1. Generalized anxiety disorder F41.1 escitalopram oxalate (LEXAPRO) 20 mg tablet      DISCONTINUED: escitalopram oxalate (LEXAPRO) 20 mg tablet   2. Major depressive disorder, recurrent, mild (HC) F33.0 escitalopram oxalate (LEXAPRO) 20 mg tablet      DISCONTINUED: escitalopram oxalate (LEXAPRO) 20 mg tablet   3. Rosacea L71.9 metroNIDAZOLE (METROGEL) 1 % gel        Plan:  I refilled her lexapro, which appears to be working really well for her.  Regarding the rash, I want her to use the topical metronidazole along with daily acne face wash.  25/30 minutes counseling today about the anxiety and depression.    Alin Pimentel MD ....................  7/21/2017   3:42 PM

## 2017-12-28 NOTE — TELEPHONE ENCOUNTER
Patient Information     Patient Name MRN Laverne Harrison [id number] Female 1980      Telephone Encounter by Alin Pimentel MD at 10/2/2017 10:29 AM     Author:  Alin Pimentel MD Service:  (none) Author Type:  Physician     Filed:  10/2/2017 10:30 AM Encounter Date:  10/2/2017 Status:  Signed     :  Alin Pimentel MD (Physician)            Correct, the tramdol was not addressed and it is on a 6 month refill schedule.    Alin Pimentel MD ....................  10/2/2017   10:29 AM

## 2017-12-28 NOTE — TELEPHONE ENCOUNTER
Patient Information     Patient Name MRN Laverne Harrison [id number] Female 1980      Telephone Encounter by Yadira Warner at 10/16/2017  9:43 AM     Author:  Yadira Warner Service:  (none) Author Type:  (none)     Filed:  10/16/2017  9:44 AM Encounter Date:  10/16/2017 Status:  Signed     :  Yadira Warner            Called [facility] and the Rx was in the  Process of being done  Yadira Warner ....................  10/16/2017   9:44 AM

## 2017-12-28 NOTE — TELEPHONE ENCOUNTER
Patient Information     Patient Name MRN Sex Laverne Jeff [id number] Female 1980      Telephone Encounter by Conchita Perrin RN at 2017  3:23 PM     Author:  Conchita Perrin RN Service:  (none) Author Type:  NURS- Registered Nurse     Filed:  2017  3:25 PM Encounter Date:  2017 Status:  Signed     :  Conchita Perrin RN (NURS- Registered Nurse)            Office visit in the past 12 months or per provider note.    Last visit with JOSE BERUMEN was on: 10/13/2017 in [facility]  Next visit with JOSE BERUMEN is on: No future appointment listed with this provider  Next visit with Family Practice is on: No future appointment listed in this department    Max refill for 12 months from last office visit or per provider note.    FU in 6 mos per LOV. Prescription refilled per RN Medication Refill Policy.................... CONCHITA PERRIN RN ....................  2017   3:24 PM

## 2017-12-28 NOTE — TELEPHONE ENCOUNTER
Patient Information     Patient Name MRN Sex Laverne Jeff [id number] Female 1980      Telephone Encounter by Yadira aWrner at 10/24/2017  4:41 PM     Author:  Yadira Warner Service:  (none) Author Type:  (none)     Filed:  10/24/2017  4:41 PM Encounter Date:  10/24/2017 Status:  Signed     :  Yadira Warner            Called Pt  Yadira Warner ....................  10/24/2017   4:41 PM

## 2017-12-28 NOTE — TELEPHONE ENCOUNTER
Patient Information     Patient Name MRN Laverne Harrison [id number] Female 1980      Telephone Encounter by Yadira Warner at 10/16/2017  9:09 AM     Author:  Yadira Warner Service:  (none) Author Type:  (none)     Filed:  10/16/2017  9:10 AM Encounter Date:  10/16/2017 Status:  Signed     :  Yadira Warner            Faxed over the medication for Tramadol, [facility] and also called in. Please call Pt to conform that Rx is there  Yadira Warner ....................  10/16/2017   9:10 AM

## 2017-12-28 NOTE — PATIENT INSTRUCTIONS
Patient Information     Patient Name MRN Laverne Harrison [id number] Female 1980      Patient Instructions by Ange Funes PA-C at 2017  2:15 PM     Author:  Ange Funes PA-C Service:  (none) Author Type:  PHYS- Physician Assistant     Filed:  2017  2:37 PM Encounter Date:  2017 Status:  Signed     :  Ange Funes PA-C (PHYS- Physician Assistant)            Antibiotic has been sent to pharmacy. Please take full course of antibiotic even if symptoms have completely resolved. This helps prevent against antibiotic resistance.     Patient prescribed antibiotics. Monitor for any fevers or chills. Return in 7-10 days if not feeling better. Please call clinic with any questions or concerns. Please take in a lot of fluids and get rest. Return with any change or worsening of symptoms.    May use symptomatic care with tylenol or ibuprofen. Sudafed or mucinex work well for congestion. May use cough syrup or cough drops.  Using a humidifier works well to break up the congestion. You can also sleep propped up on a couple pillows to decrease symptoms at night. A nettipot works well to decrease nasal congestion.    Use a Neti Pot/sinus flush (Phil Med Sinus Rinse) 3 times daily to irrigate sinuses/mucosal tissue.     Sudafed or mucinex work well for congestion.   If you choose pseudoephedrine, use for only 5-7 days AS DIRECTED. Speak to your pharmacist if you have any concerns about your medications. May also use decongestant nasal spray, but only for 3 days MAXIMUM.    You will need to be evaluated if you start to experience:  Fever higher than 102.5 F (39.2 C)   Sudden and severe pain in the face and head   Trouble seeing or seeing double   Trouble thinking clearly   Swelling or redness around 1 or both eyes   Trouble breathing or a stiff neck    * If you are a smoker, try to quit *    Call  or go to the emergency room if you:  Have trouble breathing   Are drooling  because you cannot swallow your saliva   Have swelling of the neck or tongue   Cannot move your neck or have trouble opening your mouth

## 2017-12-28 NOTE — TELEPHONE ENCOUNTER
Patient Information     Patient Name MRN Sex Laverne Jeff [id number] Female 1980      Telephone Encounter by Conchita Perrin RN at 2017  9:17 AM     Author:  Conchita Perrin RN Service:  (none) Author Type:  NURS- Registered Nurse     Filed:  2017  9:18 AM Encounter Date:  2017 Status:  Signed     :  Conchita Perrin RN (NURS- Registered Nurse)            Diuretic Combinations    Office visit in the past 12 months or per provider note.    Last visit with JOSE BERUMEN was on: 2017 in [facility]  Next visit with JOSE BERUMEN is on: No future appointment listed with this provider  Next visit with Family Practice is on: No future appointment listed in this department    Lab test requirements:  Creatinine and Potassium annually, if ordering lab, order BMP.  CREATININE (mg/dL)    Date Value   2017 0.62 (L)     POTASSIUM (mmol/L)    Date Value   2017 4.2       Max refill for 12 months from last office visit or per provider note.    Prescription refilled per RN Medication Refill Policy.................... CONCHITA PERRIN RN ....................  2017   9:17 AM

## 2017-12-28 NOTE — TELEPHONE ENCOUNTER
Patient Information     Patient Name MRN Laverne Harrison [id number] Female 1980      Telephone Encounter by Alin Pimentel MD at 10/16/2017  9:35 AM     Author:  Alin Pimentel MD Service:  (none) Author Type:  Physician     Filed:  10/16/2017  9:36 AM Encounter Date:  10/16/2017 Status:  Signed     :  Alin Pimentel MD (Physician)            We faxed it in last Friday.  I do not understand why she has not been able to get it.  Please investigate further.  Alin Pimentel MD ....................  10/16/2017   9:36 AM

## 2017-12-28 NOTE — TELEPHONE ENCOUNTER
Patient Information     Patient Name MRN Laverne Harrison [id number] Female 1980      Telephone Encounter by Valorie aCstorena RN at 2017 10:47 AM     Author:  Valorie Castorena RN Service:  (none) Author Type:  NURS- Registered Nurse     Filed:  2017 10:49 AM Encounter Date:  9/15/2017 Status:  Signed     :  Valorie Castorena RN (NURS- Registered Nurse)            Redundant Refill Request refused.    Medication:clonazePAM (KLONOPIN) 1 mg tablet    Qty:90 tablet   Ref:1  Start:2017   End:              Route:Oral                  MELISSA:No   Class:Print    Sig:Take 1 tablet by mouth at bedtime.    Pharmacy:[facility] [address] [place] [address]   [address]              OF [facility] [address] - [phone]    Unable to complete prescription refill per RN Medication Refill Policy.................... Valorie Castorena RN ....................  2017   10:48 AM

## 2017-12-28 NOTE — PROGRESS NOTES
Patient Information     Patient Name MRN Sex Laverne Jeff [id number] Female 1980      Progress Notes by Ange Montalvo at 10/16/2017  2:31 PM     Author:  Ange Montalvo Service:  (none) Author Type:  Other Clinical Staff     Filed:  10/16/2017  2:31 PM Date of Service:  10/16/2017  2:31 PM Status:  Signed     :  Ange Montalvo (Other Clinical Staff)            Falls Risk Criteria:    Age 65 and older or under age 4        Sensory deficits    Poor vision    Use of ambulatory aides    Impaired judgment    Unable to walk independently    Meets High Risk criteria for falls:  no

## 2017-12-29 NOTE — PATIENT INSTRUCTIONS
Patient Information     Patient Name MRN Laverne Harrison [id number] Female 1980      Patient Instructions by Alin Pimentel MD at 2017  3:00 PM     Author:  Alin Pimentel MD Service:  (none) Author Type:  Physician     Filed:  2017  3:46 PM Encounter Date:  2017 Status:  Signed     :  Alin Pimentel MD (Physician)            CBT insomnia

## 2017-12-30 NOTE — NURSING NOTE
Patient Information     Patient Name MRN Laverne Harrison [id number] Female 1980      Nursing Note by Cristina Luna at 2017  2:15 PM     Author:  Cristina Luna Service:  (none) Author Type:  (none)     Filed:  2017  2:32 PM Encounter Date:  2017 Status:  Signed     :  Cristina Luna            Patient presents to the clinic for cough and chest congestion that she states that she has had for five days now.  Cristina Luna LPN........................2017  2:17 PM

## 2017-12-30 NOTE — NURSING NOTE
Patient Information     Patient Name MRN Laverne Harrison [id number] Female 1980      Nursing Note by Yadira Warner at 2017  3:00 PM     Author:  Yadira Warner Service:  (none) Author Type:  (none)     Filed:  2017  3:26 PM Encounter Date:  2017 Status:  Signed     :  Yadira Warner            Coming in for a medication check,refillm Lexapro  Yadira Warner ....................  2017   3:02 PM

## 2018-01-02 NOTE — NURSING NOTE
Patient Information     Patient Name MRN Laverne Harrison [id number] Female 1980      Nursing Note by Selene Nice LPN at 2017  9:30 AM     Author:  Selene Nice LPN Service:  (none) Author Type:  NURS- Licensed Practical Nurse     Filed:  2017  9:58 AM Encounter Date:  2017 Status:  Signed     :  Selene Nice LPN (NURS- Licensed Practical Nurse)            The patient is here today with complaints of cough, sweats, chills and ear aches.  Selene Nice LPN......2017  9:50 AM

## 2018-01-02 NOTE — PROGRESS NOTES
Patient Information     Patient Name MRN Sex Laverne Jeff [id number] Female 1980      Progress Notes by Shanti Moses PA-C at 2017  9:30 AM     Author:  Shanti Moses PA-C Service:  (none) Author Type:  PHYS- Physician Assistant     Filed:  2017  3:30 PM Encounter Date:  2017 Status:  Signed     :  Shanti Moses PA-C (PHYS- Physician Assistant)            SUBJECTIVE:    Laverne Ngueyn is a 36 y.o. female who presents for cough.  HPI Comments: Laverne is here with complaints of 4 days of nasal congestion and cough.  She has little pieces of green mucus that she coughs up.  Sinuses are painful, maxillary and frontal.   She has ear pain off and on.  Feels congestion in the chest but coughs up very little.      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 90 tablet 0     cetirizine (ZYRTEC) 10 mg tablet Take 1 tablet by mouth once daily. 90 tablet 3     cholecalciferol (VITAMIN D-3) 2,000 unit capsule 1 tablet daily  0     clonazePAM (KLONOPIN) 1 mg tablet TAKE 1 TABLET BY MOUTH EVERY NIGHT AT BEDTIME. 30 tablet 4     cyanocobalamin (VITAMIN B12) 1,000 mcg/mL injection Inject 1 mL intramuscular every 4 weeks. 1 mL 11     EPINEPHrine (EPIPEN) 0.3 mg/0.3 mL injection Inject 0.3 mg  "intramuscular one time if needed for Allergic Reaction for 1 dose. 2 Each 3     escitalopram oxalate (LEXAPRO) 20 mg tablet Take 1 tablet by mouth once daily. 90 tablet 0     hydrOXYzine pamoate (VISTARIL) 25 mg capsule Take 1 capsule by mouth every 8 hours if needed for Anxiety. Do not mix with Klonopin 30 capsule 0     levothyroxine (SYNTHROID) 88 mcg tablet TAKE 1 TABLET BY MOUTH ONCE DAILY. 90 tablet 1     lisinopril-hydrochlorothiazide 20-12.5 mg tablet (PRINIZIDE) TAKE 1 TABLET BY MOUTH EVERY DAY 90 tablet 3     montelukast (SINGULAIR) 10 mg tablet TAKE 1 TABLET BY MOUTH AT BEDTIME 90 tablet 2     multivitamin (MVI) tablet Take 1 tablet by mouth once daily.  0     Syringe with Needle, Disp, (MEDSAVER SYRINGE 3CC/25GX1) 3 mL 25 x 1\" Syrg As directed. 1 Syringe 11     traMADol (ULTRAM) 50 mg tablet Take 1 tablet by mouth 3 times daily if needed. 90 tablet 5     traZODone (DESYREL) 100 mg tablet Take 1 tablet by mouth at bedtime. 90 tablet 0     No current facility-administered medications on file prior to visit.      Allergies      Allergen   Reactions     Amoxicillin  Other - Describe In Comment Field     Doesn't work for patient       Social history:  Smoker.  Needs to quit.  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     REVIEW OF SYSTEMS:  Review of Systems   Constitutional: Positive for chills, fever and malaise/fatigue.        Subjective fevers.     HENT:        Sinus headaches.     Respiratory: Positive for cough and sputum production. Negative for shortness of breath and wheezing.         Felt short of breath yesterday, not today.     Cardiovascular: Negative for chest pain and palpitations.   Gastrointestinal: Negative for abdominal pain, nausea and vomiting.   Neurological: Positive for headaches.       OBJECTIVE:  /74  Pulse 84  Temp 99.3  F (37.4  C) (Tympanic)  Ht 1.651 m (5' 5\")  Wt 118.4 kg (261 lb)  LMP 12/23/2016  SpO2 99%  Breastfeeding? No  BMI 43.43 kg/m2    EXAM:   Physical Exam "   Constitutional: She is well-developed, well-nourished, and in no distress.   She looks miserable but nontoxic.   HENT:   Mouth/Throat: Oropharynx is clear and moist. No oropharyngeal exudate.   TMs intact and gray with minimal serous fluid. Nose congested and inflammed. Maxillary sinuses tender to palpation bilaterally.   Eyes: Conjunctivae are normal. Pupils are equal, round, and reactive to light. Right eye exhibits no discharge. Left eye exhibits no discharge.   Neck: Normal range of motion.   Cardiovascular: Normal rate, regular rhythm and normal heart sounds.    No murmur heard.  Pulmonary/Chest: Effort normal and breath sounds normal. No respiratory distress. She has no wheezes.   Lymphadenopathy:     She has no cervical adenopathy.   Skin: Skin is warm and dry. No rash noted.       ASSESSMENT/PLAN:    ICD-10-CM    1. Upper respiratory tract infection, unspecified type J06.9         Plan:  Rest, oral fluids, steam treatments.  Honey or Delsym for cough.  Stop smoking.  Reviewed normal course of viral illness.  She should gradually improve.  She may not feel up to working tomorrow, looks quite miserable today. Note given.  If she develops fever, dyspnea, or other concern, get rechecked.   Patient understands and agrees with the plan.     Shanti Moses PA-C ....................  1/8/2017   3:29 PM

## 2018-01-02 NOTE — PATIENT INSTRUCTIONS
Patient Information     Patient Name MRN Laverne Harrison [id number] Female 1980      Patient Instructions by Shanti Moses PA-C at 2017  9:30 AM     Author:  Shanti Moses PA-C Service:  (none) Author Type:  PHYS- Physician Assistant     Filed:  2017 10:07 AM Encounter Date:  2017 Status:  Signed     :  Shanti Moses PA-C (PHYS- Physician Assistant)               Index [place] Related topics   Colds   What are colds?   Colds are an infection of the head and chest caused by a virus. They are a type of upper respiratory infection (URI). They can affect your nose, throat, sinuses, eyes, and ears. A cold can also affect the tube that connects your middle ear and throat, as well as your windpipe, voice box, and the airways in your lungs.  What is the cause?  Over 200 different viruses can cause colds. The infection spreads when viruses are passed to others by sneezing, coughing, or touching. You may also become infected by handling objects that were touched by someone with a cold. Some of the cold viruses live up to 3 hours on the skin and on objects, such as doorknobs or telephones.  You are more likely to get a cold if:    You are stressed.    You are tired.    You do not eat a healthy diet.    You are a smoker or are exposed to secondhand smoke.    You are living or working in crowded conditions.    You don t wash your hands often.  People tend to get fewer colds as they get older because they have already had many colds and their immune system is able to fight off some of the viruses that can cause colds.  What are the symptoms?   You usually start having cold symptoms 1 to 3 days after contact with a cold virus. Symptoms may include:    Scratchy or sore throat    Sneezing    Runny or stuffy nose    Cough    Watery eyes    Ears that feel stuffy or blocked    Slight fever (99 to 100 F, or 37.2 to 37.8 C)    Feeling tired    Headache    Loss of appetite  Cold and flu symptoms are  similar. The difference is that when you have the flu, the symptoms start within a few hours. The symptoms of a cold develop more slowly.  How are they treated?   Most of the time you don t need to see your healthcare provider for treatment. There are no medicines that cure a cold. Medicines that you can buy at most drugstores can help relieve your symptoms. You can:    Get lots of rest.    Drink extra fluids, such as water, fruit juice, and tea.    Use a humidifier to put more moisture in the air. Avoid steam vaporizers because they can cause burns. Be sure to keep the humidifier clean, as recommended in the 's instructions. It's important to keep bacteria and mold from growing in the water container.    Take nonprescription medicine, such as acetaminophen, ibuprofen, or naproxen to treat pain and fever. Read the label and take as directed. Unless recommended by your healthcare provider, you should not take these medicines for more than 10 days.    Nonsteroidal anti-inflammatory medicines (NSAIDs), such as ibuprofen, naproxen, and aspirin, may cause stomach bleeding and other problems. These risks increase with age.    Acetaminophen may cause liver damage or other problems. Unless recommended by your provider, don't take more than 3000 milligrams (mg) in 24 hours. To make sure you don t take too much, check other medicines you take to see if they also contain acetaminophen. Ask your provider if you need to avoid drinking alcohol while taking this medicine.    Decongestants pills or nasal spray may reduce swelling in your nose and sinuses and lessen the amount of mucus. Use decongestants as directed. If you are using a nonprescription nasal-spray decongestant, generally you should not use it for more than 3 days. After 3 days it may make your symptoms worse. Ask your healthcare provider if it is OK for you to use a nasal spray decongestant longer than this.    Use cough drops, pain relievers, or salt  "water gargles for a sore throat. You can make a salt water gargle with 1 teaspoon table salt in 1 cup (8 ounces) of warm water.    You can buy many different medicines for coughs without a prescription. However, there is no proof that they will actually help your cough. Cough medicines may cause harm to young children, but they are generally safe for older children and adults.    If you need relief from a dry, hacking cough, choose a medicine labeled \"cough suppressant.\" A cough suppressant may help you cough less and sleep better. Cough medicines with the initials DM in the name contain the suppressant drug called dextromethorphan.    If you need to loosen mucus, choose a medicine labeled \"expectorant.\" Expectorants may help keep your mucus thin and bring it up from the lungs when you cough. This may relieve chest congestion and make it easier to breathe. The drug used most often as an expectorant is guaifenesin.    Do not give a child under age 4 any cough and cold medicines unless you have instructions from your healthcare provider. Children over 6 years of age may be given cough drops or hard candies to relieve a sore throat or cough.    If you are pregnant, check first with your healthcare provider before taking any cold or cough medicines.  Colds usually last 1 to 2 weeks. Contact your healthcare provider if you have new or worsening symptoms or your symptoms last longer than 2 weeks.  How can I help prevent colds?  If you are sick, you can help protect others if you:    Don t go to work or school. Avoid contact with other people except to get medical care.    Cover your nose and mouth with a tissue when you cough or sneeze. Throw the tissue in the trash after you use it, and then wash your hands. If you don t have a tissue, cough or sneeze into your upper sleeve instead of your hands.    Clean your hands often with soap and water or an alcohol-based hand , especially after using tissues or coughing or " sneezing into your hands.  To lower your risk of catching a cold:    Wash your hands often and especially after using the restroom, coughing, sneezing, or blowing your nose. Also wash your hands before eating or touching your eyes.    Stay at least 6 feet away from people who are sick, if you can.    Keep surfaces clean--especially bedside tables, surfaces in the bathroom, and toys for children. Some viruses and bacteria can live 2 hours or more on surfaces like cafeteria tables, doorknobs, and desks. Wipe them down with a household disinfectant according to directions on the label.    Take care of your health. Try to get at least 7 to 9 hours of sleep each night. Eat a healthy diet and try to keep a healthy weight. If you smoke, try to quit. If you want to drink alcohol, ask your healthcare provider how much is safe for you to drink. Learn ways to manage stress. Exercise according to your healthcare provider's instructions.  Developed by [employer].  Adult Advisor 2016.2 published by [employer].  Last modified: 2014-10-21  Last reviewed: 2014-09-04  This content is reviewed periodically and is subject to change as new health information becomes available. The information is intended to inform and educate and is not a replacement for medical evaluation, advice, diagnosis or treatment by a healthcare professional.  References   Adult Advisor 2016.2 Index    Copyright   2016 [employer], a division of [employer] All rights reserved.

## 2018-01-03 NOTE — TELEPHONE ENCOUNTER
Patient Information     Patient Name MRN Laverne Harrison [id number] Female 1980      Telephone Encounter by Sameera Asher RN at 2017  3:41 PM     Author:  Sameera Asher RN Service:  (none) Author Type:  (none)     Filed:  2017  3:48 PM Encounter Date:  2017 Status:  Signed     :  Sameera Asher RN (NURS- Registered Nurse)            This is a Refill request from: [facility]  Name of Medication: Buspar  Quantity requested: #90  Last fill date: 2016  Due for refill: Yes  Last visit with ALIN PIMENTEL was on: 2016 in [facility]  PCP:  Alin Pimentel MD  Controlled Substance Agreement:  Signed 2016   Diagnosis r/t this medication request: Generalized anxiety disorder, Major depressive disorder      Name of Medication: Clonazepam  Quantity requested: #30  Last fill date: 16  Due for refill: Yes  Diagnosis r/t this medication request: Generalized anxiety disorder, Major depressive disorder    TERESA-7 ANXIETY SCREENING 2016   TERESA date (doc flow) 2016   Nervous, anxious 2 2   Cannot stop worrying 2 2   Worry about different things 2 2   Cannot relax 2 2   Feeling restless 0 2   Easily annoyed/irritated 1 3   Afraid of awful event 0 2   Score 9 15   Severity mild anxiety severe anxiety       PHQ Depression Screening 2016   Date of PHQ exam (doc flow) 2016 (No Data)   1. Lack of interest/pleasure 1 - Several days -   2. Feeling down/depressed 1 - Several days -   PHQ-2 TOTAL SCORE 2 -   3. Trouble sleeping 1 - Several days -   4. Decreased energy 1 - Several days -   5. Appetite change 1 - Several days -   6. Feelings of failure 0 - Not at all -   7. Trouble concentrating 0 - Not at all -   8. Activity level 0 - Not at all -   9. Hurting yourself 0 - Not at all -   PHQ-9 TOTAL SCORE 5 -   PHQ-9 Severity Level mild -   Functional Impairment somewhat difficult -        Unable to  complete prescription refill per RN Medication Refill Policy.................... Sameera Asher ....................  1/30/2017   3:42 PM

## 2018-01-03 NOTE — TELEPHONE ENCOUNTER
Patient Information     Patient Name MRN Laverne Harrison [id number] Female 1980      Telephone Encounter by Yadira Warner at 2017 11:19 AM     Author:  Yadira Warner Service:  (none) Author Type:  (none)     Filed:  2017 11:19 AM Encounter Date:  2017 Status:  Signed     :  Yadira Warner            Faxed Lanie Warner ....................  2017   11:19 AM

## 2018-01-03 NOTE — TELEPHONE ENCOUNTER
Patient Information     Patient Name MRN Laverne Harrison [id number] Female 1980      Telephone Encounter by Yadira Warner at 2017  8:35 AM     Author:  Yadira Warner Service:  (none) Author Type:  (none)     Filed:  2017  8:35 AM Encounter Date:  2017 Status:  Signed     :  Yadira Warner            FAXED Lanie Warner ....................  2017   8:35 AM

## 2018-01-03 NOTE — NURSING NOTE
Patient Information     Patient Name MRN Laverne Harrison [id number] Female 1980      Nursing Note by Joy Mills at 3/19/2017  9:45 AM     Author:  Joy Mills Service:  (none) Author Type:  (none)     Filed:  3/19/2017 10:05 AM Encounter Date:  3/19/2017 Status:  Signed     :  Joy Mills            Patient presents to the clinic today for left hand pain, she fell 3/17.    Joy Mills ....................  3/19/2017   9:44 AM

## 2018-01-03 NOTE — PATIENT INSTRUCTIONS
Patient Information     Patient Name MRN Sex Laverne Jeff [id number] Female 1980      Patient Instructions by Kristin Sullivan NP at 3/19/2017  9:45 AM     Author:  Kristin Sullivan NP  Service:  (none) Author Type:  PHYS- Nurse Practitioner     Filed:  3/19/2017 10:22 AM  Encounter Date:  3/19/2017 Status:  Addendum     :  Kristin Sullivan NP (PHYS- Nurse Practitioner)        Related Notes: Original Note by Kristin Sullivan NP (PHYS- Nurse Practitioner) filed at 3/19/2017 10:21 AM            Ice for 20 minutes every 2-3 hours    Heat after day 3 of injury 3-4 times a day for 20 minutes and can alternate with Ice.    Ibuprofen up to 800 mg three times a day will help.     Tylenol as needed.     If in 2 weeks not better or improving call or come back    Ace bandage for comfort

## 2018-01-03 NOTE — TELEPHONE ENCOUNTER
Patient Information     Patient Name MRN Laverne Harrison [id number] Female 1980      Telephone Encounter by Sameera Asher RN at 2017  9:56 AM     Author:  Sameera Asher RN Service:  (none) Author Type:  (none)     Filed:  2017 10:36 AM Encounter Date:  2017 Status:  Signed     :  Sameera Asher RN (NURS- Registered Nurse)            This is a Refill request from: [facility]  Name of Medication: Clonazepam  Quantity requested: #30  Last fill date: 2017  Due for refill: Yes  Last visit with ALIN PIMENTEL was on: 2016 in [facility]  PCP:  Alin Pimentel MD  Controlled Substance Agreement:  Signed 2016   Diagnosis r/t this medication request: Generalized Anxiety Disorder    TERESA-7 ANXIETY SCREENING 2016   TERESA date (doc flow) 2016   Nervous, anxious 2 2   Cannot stop worrying 2 2   Worry about different things 2 2   Cannot relax 2 2   Feeling restless 0 2   Easily annoyed/irritated 1 3   Afraid of awful event 0 2   Score 9 15   Severity mild anxiety severe anxiety         Name of Medication: Trazodone  Quantity requested: #90  Last fill date: 2016  Due for refill: Yes  Diagnosis r/t this medication request: Anxiety       Unable to complete prescription refill per RN Medication Refill Policy.................... Sameera Asher ....................  2017   10:20 AM

## 2018-01-03 NOTE — PROGRESS NOTES
Patient Information     Patient Name MRN Sex Laverne Jeff [id number] Female 1980      Progress Notes by Kristin Sullivan NP at 3/19/2017  9:45 AM     Author:  Kristin Sullivan NP Service:  (none) Author Type:  PHYS- Nurse Practitioner     Filed:  3/19/2017 11:45 AM Encounter Date:  3/19/2017 Status:  Signed     :  Kristin Sullivan NP (PHYS- Nurse Practitioner)            Nursing Notes:   Joy Mills  3/19/2017 10:05 AM  Signed  Patient presents to the clinic today for left hand pain, she fell 3/17.    Joy Mills ....................  3/19/2017   9:44 AM     SUBJECTIVE:    Laverne Nguyen is a 36 y.o. female who presents for Hand pain    HPI  Mechanism of injury: Fell and injured LT hand  Pain is described as achy, sharp and is located LT hand  Intensity is 6 /10.  Duration has been for 2 days, DOI 3/17/17  It does not radiate.  Rest, ice makes it better.  Movement, palpation makes it worse.  Associated symptoms include: Bruising, swelling  Immediate pain, swelling and bruising after the incident   There is No past injury to the LT hand    Current Outpatient Prescriptions on File Prior to Visit       Medication  Sig Dispense Refill     busPIRone (BUSPAR) 10 mg tablet TAKE 1 TABLET BY MOUTH THREE TIMES DAILY AS NEEDED FOR ANXIETY. 90 tablet 11     cetirizine (ZYRTEC) 10 mg tablet Take 1 tablet by mouth once daily. 90 tablet 3     cholecalciferol (VITAMIN D-3) 2,000 unit capsule 1 tablet daily  0     clonazePAM (KLONOPIN) 1 mg tablet TAKE 1 TABLET BY MOUTH AT BEDTIME 90 tablet 1     cyanocobalamin (VITAMIN B12) 1,000 mcg/mL injection Inject 1 mL intramuscular every 4 weeks. 1 mL 11     EPINEPHrine (EPIPEN) 0.3 mg/0.3 mL injection Inject 0.3 mg intramuscular one time if needed for Allergic Reaction for 1 dose. 2 Each 3     escitalopram oxalate (LEXAPRO) 20 mg tablet Take 1 tablet by mouth once daily. 90 tablet 0     hydrOXYzine pamoate (VISTARIL) 25 mg capsule Take 1 capsule by  "mouth every 8 hours if needed for Anxiety. Do not mix with Klonopin 30 capsule 0     levothyroxine (SYNTHROID) 88 mcg tablet TAKE 1 TABLET BY MOUTH ONCE DAILY. 90 tablet 1     lisinopril-hydrochlorothiazide 20-12.5 mg tablet (PRINIZIDE) TAKE 1 TABLET BY MOUTH EVERY DAY 90 tablet 3     montelukast (SINGULAIR) 10 mg tablet TAKE 1 TABLET BY MOUTH AT BEDTIME 90 tablet 2     multivitamin (MVI) tablet Take 1 tablet by mouth once daily.  0     Syringe with Needle, Disp, (MEDSAVER SYRINGE 3CC/25GX1) 3 mL 25 x 1\" Syrg As directed. 1 Syringe 11     traMADol (ULTRAM) 50 mg tablet Take 1 tablet by mouth 3 times daily if needed. 90 tablet 5     traZODone (DESYREL) 100 mg tablet Take 1 tablet by mouth at bedtime. 90 tablet 3     No current facility-administered medications on file prior to visit.        REVIEW OF SYSTEMS:  ROS    OBJECTIVE:  /96  Pulse 72  Ht 1.651 m (5' 5\")  Wt 117 kg (258 lb)  LMP 03/10/2017  Breastfeeding? No  BMI 42.93 kg/m2    EXAM:   Physical Exam   Constitutional: She is well-developed, well-nourished, and in no distress.   HENT:   Head: Normocephalic and atraumatic.   Eyes: Conjunctivae are normal.   Cardiovascular: Normal rate.    Pulmonary/Chest: Effort normal. No respiratory distress.   Musculoskeletal:        Left hand: She exhibits decreased range of motion, tenderness and swelling. She exhibits no bony tenderness, normal capillary refill, no deformity and no laceration.        Hands:  Diagram shows swelling tenderness, and bruising.    Skin: Skin is warm and dry.   Nursing note and vitals reviewed.    Completed LT Hand xray.  I personally reviewed the xray. There was no fractures noted upon initial read of xray.  Final read pending by radiology.    ASSESSMENT/PLAN:    ICD-10-CM    1. Hand injuries, left, initial encounter S69.92XA XR HAND 3 VIEWS LEFT   2. Contusion of left hand, initial encounter S60.222A         Plan:  Home cares and OTC gone over. I explained my diagnostic considerations " and recommendations to the patient, who voiced understanding and agreement with the treatment plan. All questions were answered. We discussed potential side effects of any prescribed or recommended therapies, as well as expectations for response to treatments. She was advised to contact our office if there is no improvement or worsening of conditions or symptoms.  If s/s worsen or persist, patient will either come back or follow up with PCP.       MO VU NP ....................  3/19/2017   11:45 AM

## 2018-01-04 NOTE — TELEPHONE ENCOUNTER
Patient Information     Patient Name MRN Laverne Harrison [id number] Female 1980      Telephone Encounter by Yadira Warner at 2017  4:22 PM     Author:  Yadira Warner Service:  (none) Author Type:  (none)     Filed:  2017  4:22 PM Encounter Date:  2017 Status:  Signed     :  Yadira Warner            Calling about a book that you had recommented, needs the name  Yadira Warner ....................  2017   4:22 PM

## 2018-01-04 NOTE — NURSING NOTE
Patient Information     Patient Name MRN Laverne Harrison [id number] Female 1980      Nursing Note by Yadira Warner at 3/31/2017 12:15 PM     Author:  Yadira Warner Service:  (none) Author Type:  (none)     Filed:  3/31/2017 12:46 PM Encounter Date:  3/31/2017 Status:  Signed     :  Yadira Warner            Medication update and is having a lot of anxietyYadira ....................  3/31/2017   12:18 PM

## 2018-01-04 NOTE — PATIENT INSTRUCTIONS
Patient Information     Patient Name MRN Laverne Harrison [id number] Female 1980      Patient Instructions by Elda Tadeo NP at 2017  1:36 PM     Author:  Elda Tadeo NP Service:  (none) Author Type:  PHYS- Nurse Practitioner     Filed:  2017  1:36 PM Encounter Date:  2017 Status:  Signed     :  Elda Tadeo NP (PHYS- Nurse Practitioner)               Index [facility] Related topics   Rib Injury   ________________________________________________________________________  KEY POINTS    A rib injury is a bruise, strain, break, separation, or irritation of one or more of the ribs in your chest. It can also be an injury to the tissue called cartilage that attaches the top 10 ribs to the breastbone.    Your healthcare provider may prescribe pain medicine and breathing exercises while the ribs heal to prevent lung problems.    It may also help to rest and put an ice pack, gel pack, or package of frozen vegetables wrapped in a cloth on the injured ribs a few times a day.  ________________________________________________________________________  What is a rib injury?   A rib injury is a bruise, strain, break, separation, or irritation of one or more of the ribs in your chest. It can also be an injury to the tissue called cartilage that attaches the top 10 ribs to the breastbone.  What is the cause?   A fall or direct blow to the chest may bruise, strain, or break the ribs or injure the rib cartilage. Breaks usually happen in the outer curved part of the rib cage.   When a rib tears away from the cartilage, the injury is called a costochondral separation. It may result from a blow to the ribs, a fall, or landing hard on your feet. It might even be caused by forceful coughing or sneezing.  Irritation of a rib is called costochondritis. It may be caused by an infection or repeated coughing, or by overuse, like from rowing or heavy lifting. Sometimes the cause is not known.  What are  the symptoms?   A rib injury causes pain and tenderness in the ribs. You may have pain when you breathe, move, laugh, or cough.  How is it diagnosed?   Your healthcare provider will ask about your symptoms and medical history. Your provider will examine your chest and listen to your lungs. Tests may include X-rays or other scans.  How is it treated?  Rib injuries can be painful and make it hard to cough or take a deep breath. Your healthcare provider may prescribe pain medicine or injections to block pain.   You may need to do breathing exercises while you heal to prevent lung problems.   Rib injuries usually heal without surgery. Bruised ribs or a costochondral separation usually take 3 to 4 weeks to heal. Broken ribs take 6 to 8 weeks to heal.   How can I take care of myself?  To relieve pain and help the injury heal:    Rest.    Put an ice pack, gel pack, or package of frozen vegetables wrapped in a cloth on the injured ribs every 3 to 4 hours for up to 20 minutes at a time.    Take nonprescription pain medicine, such as acetaminophen, ibuprofen, or naproxen. Read the label and take as directed. Unless recommended by your healthcare provider, you should not take these medicines for more than 10 days.    Nonsteroidal anti-inflammatory medicines (NSAIDs), such as ibuprofen, naproxen, and aspirin, may cause stomach bleeding and other problems. These risks increase with age.    Acetaminophen may cause liver damage or other problems. Unless recommended by your provider, don't take more than 3000 milligrams (mg) in 24 hours. To make sure you don t take too much, check other medicines you take to see if they also contain acetaminophen. Ask your provider if you need to avoid drinking alcohol while taking this medicine.    If coughing is painful, holding a pillow against your chest may help.  Follow your healthcare provider's instructions. Ask your provider:    How and when you will get your test results    How long it  will take to recover    If there are activities you should avoid, including how much you can lift, and when you can return to your normal activities    How to take care of yourself at home    What symptoms or problems you should watch for and what to do if you have them  Make sure you know when you should come back for a checkup. Keep all appointments for provider visits or tests.  How can I help prevent rib injury?  Ribs are often injured in accidents that are not easy to prevent.   In contact sports like football it s important to wear the proper protective equipment.  Developed by [employer].  Adult Advisor 2016.3 published by [employer].  Last modified: 2016-03-23  Last reviewed: 2015-06-29  This content is reviewed periodically and is subject to change as new health information becomes available. The information is intended to inform and educate and is not a replacement for medical evaluation, advice, diagnosis or treatment by a healthcare professional.  References   Adult Advisor 2016.3 Index    Copyright   2016 [employer], a division of [employer] All rights reserved.

## 2018-01-04 NOTE — TELEPHONE ENCOUNTER
Patient Information     Patient Name MRN Sex Laverne Jeff [id number] Female 1980      Telephone Encounter by Marija Austin at 2017  2:44 PM     Author:  Marija Austin Service:  (none) Author Type:  (none)     Filed:  2017  2:45 PM Encounter Date:  2017 Status:  Signed     :  Marija Austin            Patient 's  will come  rib belt.  Marija Austin LPN ....................2017  2:45 PM

## 2018-01-04 NOTE — TELEPHONE ENCOUNTER
Patient Information     Patient Name MRN Laverne Harrison [id number] Female 1980      Telephone Encounter by Sameera Asher RN at 3/23/2017 11:25 AM     Author:  Sameera Asher RN Service:  (none) Author Type:  (none)     Filed:  3/23/2017 11:27 AM Encounter Date:  3/23/2017 Status:  Signed     :  Sameera Asher RN (NURS- Registered Nurse)            This is a Refill request from: [facility]  Name of Medication: Tramadol  Quantity requested: #90   Last fill date: 2016  Due for refill: Yes  Last visit with ALIN PIMENTEL was on: 2016 in [facility]  PCP:  Alin Pimentel MD  Controlled Substance Agreement:  Signed 2016   Diagnosis r/t this medication request: Primary osteoarthritis of both knees     Unable to complete prescription refill per RN Medication Refill Policy.................... Sameera Asher ....................  3/23/2017   11:25 AM

## 2018-01-04 NOTE — NURSING NOTE
Patient Information     Patient Name MRN Sex Laverne Jeff [id number] Female 1980      Nursing Note by Marija Austin at 2017 12:45 PM     Author:  Marija Austin Service:  (none) Author Type:  (none)     Filed:  2017 12:57 PM Encounter Date:  2017 Status:  Signed     :  Marija Austin            Patient fell Saturday night . Left side is bruised, has pain on right side.  Marija Austin LPN ....................2017  12:55 PM

## 2018-01-04 NOTE — TELEPHONE ENCOUNTER
Patient Information     Patient Name MRN Laverne Harrison [id number] Female 1980      Telephone Encounter by Alin Pimentel MD at 2017  4:46 PM     Author:  Alin Pimentel MD Service:  (none) Author Type:  Physician     Filed:  2017  4:46 PM Encounter Date:  2017 Status:  Signed     :  Alin Pimentel MD (Physician)            [facility].  Should also be on the AVS.  Alin Pimentel MD ....................  2017   4:46 PM

## 2018-01-04 NOTE — TELEPHONE ENCOUNTER
Patient Information     Patient Name MRN Sex Laverne Jeff [id number] Female 1980      Telephone Encounter by Yadira Warner at 2017  4:04 PM     Author:  Yadira Warner Service:  (none) Author Type:  (none)     Filed:  2017  4:04 PM Encounter Date:  2017 Status:  Signed     :  Yadira Warner            Called Pt with message  Yadira Warner ....................  2017   4:04 PM

## 2018-01-04 NOTE — TELEPHONE ENCOUNTER
Patient Information     Patient Name MRN Laverne Harrison [id number] Female 1980      Telephone Encounter by Sameera Asher RN at 2017  3:30 PM     Author:  Sameera Asher RN Service:  (none) Author Type:  (none)     Filed:  2017  3:31 PM Encounter Date:  2017 Status:  Signed     :  Sameera Asher RN (NURS- Registered Nurse)            Left message to call back  ....................Sameera Asher RN  2017   3:31 PM

## 2018-01-04 NOTE — TELEPHONE ENCOUNTER
Patient Information     Patient Name MRN Laverne Harrison [id number] Female 1980      Telephone Encounter by Alin Pimentel MD at 2017  3:57 PM     Author:  Alin Pimentel MD Service:  (none) Author Type:  Physician     Filed:  2017  3:59 PM Encounter Date:  2017 Status:  Signed     :  Alin Pimentel MD (Physician)            The research says this happens to 6% of people taking it.  It often goes away over time or with a lower dose, but if severe it is safe to stop it.  Replacements are limited and she would need to see monofilament to go over these options.  Alin Pimentel MD ....................  2017   3:58 PM

## 2018-01-04 NOTE — PATIENT INSTRUCTIONS
Patient Information     Patient Name MRN Laverne Harrison [id number] Female 1980      Patient Instructions by Alin Pimentel MD at 3/31/2017 12:15 PM     Author:  Alin Pimentel MD Service:  (none) Author Type:  Physician     Filed:  3/31/2017  1:01 PM Encounter Date:  3/31/2017 Status:  Signed     :  Alin Pimentel MD (Physician)            [facility]

## 2018-01-04 NOTE — TELEPHONE ENCOUNTER
Patient Information     Patient Name MRN Laverne Harrison [id number] Female 1980      Telephone Encounter by Alin Pimentel MD at 3/23/2017 11:53 AM     Author:  Alin Pimentel MD Service:  (none) Author Type:  Physician     Filed:  3/23/2017 11:53 AM Encounter Date:  3/23/2017 Status:  Signed     :  Alin Pimentel MD (Physician)            This requires a follow up every 6 months.  Alin Pimentel MD ....................  3/23/2017   11:53 AM

## 2018-01-04 NOTE — TELEPHONE ENCOUNTER
Patient Information     Patient Name MRN Laverne Harrison [id number] Female 1980      Telephone Encounter by Sameera Asher RN at 3/27/2017 12:15 PM     Author:  Sameera Asher RN Service:  (none) Author Type:  (none)     Filed:  3/27/2017 12:24 PM Encounter Date:  3/25/2017 Status:  Signed     :  Sameera Asher RN (NURS- Registered Nurse)            Antihistamines:    Office visit in the past 12 months.    Last visit with JOSE BERUMEN was on: 2016 in [facility]  Next visit with JOSE BERUMEN is on: 2017 in [facility]  Next visit with Family Practice is on: 2017 in [facility]    Max refills 12 months from last office visit.    Prescription refilled per RN Medication Refill Policy.................... Sameera Asher ....................  3/27/2017   12:15 PM

## 2018-01-04 NOTE — PROGRESS NOTES
"Patient Information     Patient Name MRN Laverne Harrison [id number] Female 1980      Progress Notes by Alin Pimentel MD at 3/31/2017 12:15 PM     Author:  Alin Pimentel MD Service:  (none) Author Type:  Physician     Filed:  4/3/2017 11:25 AM Encounter Date:  3/31/2017 Status:  Signed     :  Alin Pimentel MD (Physician)            SUBJECTIVE:    Laverne Nguyen is a 36 y.o. female who presents for TERESA    HPI    Gets chest pain, crabby and \"thinking negative thoughts and I cannot let myself be in a good mood\".  Cannot sleep well.  Feels the medications just make her too sedated.  Uses trazodone, klonopin and also 2 OTC antihistamines.  Is hard for her to tell which of the medications are actually working.  Spends lots of time crying.  Gets emotional easily.  Feels alone and \"targeted\" often.  Then takes it out on her family.  When she started lexapro again it helped for a while, but no longer.  Has been seeing a counselor, Tammie English. Last met with her a few months ago.  Some suicidal ideation without plans.    Allergies      Allergen   Reactions     Amoxicillin  Other - Describe In Comment Field     Doesn't work for patient     ,   Current Outpatient Prescriptions on File Prior to Visit       Medication  Sig Dispense Refill     busPIRone (BUSPAR) 10 mg tablet TAKE 1 TABLET BY MOUTH THREE TIMES DAILY AS NEEDED FOR ANXIETY. 90 tablet 11     cetirizine (ZYRTEC) 10 mg tablet TAKE 1 TABLET BY MOUTH TWICE DAILY. 180 tablet 1     cholecalciferol (VITAMIN D-3) 2,000 unit capsule 1 tablet daily  0     clonazePAM (KLONOPIN) 1 mg tablet TAKE 1 TABLET BY MOUTH AT BEDTIME 90 tablet 1     cyanocobalamin (VITAMIN B12) 1,000 mcg/mL injection Inject 1 mL intramuscular every 4 weeks. 1 mL 11     EPINEPHrine (EPIPEN) 0.3 mg/0.3 mL injection Inject 0.3 mg intramuscular one time if needed for Allergic Reaction for 1 dose. 2 Each 3     escitalopram oxalate (LEXAPRO) 20 mg tablet Take 1 tablet by mouth " "once daily. 90 tablet 0     hydrOXYzine pamoate (VISTARIL) 25 mg capsule Take 1 capsule by mouth every 8 hours if needed for Anxiety. Do not mix with Klonopin 30 capsule 0     levothyroxine (SYNTHROID) 88 mcg tablet TAKE 1 TABLET BY MOUTH ONCE DAILY. 90 tablet 1     lisinopril-hydrochlorothiazide 20-12.5 mg tablet (PRINIZIDE) TAKE 1 TABLET BY MOUTH EVERY DAY 90 tablet 3     montelukast (SINGULAIR) 10 mg tablet TAKE 1 TABLET BY MOUTH AT BEDTIME 90 tablet 2     multivitamin (MVI) tablet Take 1 tablet by mouth once daily.  0     Syringe with Needle, Disp, (MEDSAVER SYRINGE 3CC/25GX1) 3 mL 25 x 1\" Syrg As directed. 1 Syringe 11     traZODone (DESYREL) 100 mg tablet Take 1 tablet by mouth at bedtime. 90 tablet 3     No current facility-administered medications on file prior to visit.    ,   Past Medical History:     Diagnosis  Date     Cannabis abuse      use found on urine drug screen      Chronic low back pain      Depression with anxiety      ear infections     Recurrent      Gastroesophageal reflux disease      Healthcare maintenance      History of pregnancy      I, para 1.      Hypertension      Morbid obesity (HC) 07    with a body mass index of 51      Tobacco abuse     and   Past Surgical History:      Procedure  Laterality Date     ADENOIDECTOMY       Bilateral tympanostomy       Gastric bypass procedure  07     for obesity, Dr. Hogue, [place]       LEG/ANKLE SURGERY Right      LA COLONOSCOPY REMOVE DOMI POLYP LESN HOT MEETY FORCEP  1/10/2014    hyperplastic       LA CREATE EARDRUM OPENING GEN ANESTH  10/86     LA CREATE EARDRUM OPENING GEN ANESTH       Right tympanoplasty       with left PE tube       TUBAL LIGATION         REVIEW OF SYSTEMS:  Review of Systems   Musculoskeletal: Positive for joint pain.   Neurological: Positive for headaches.   Psychiatric/Behavioral: Positive for depression. Negative for substance abuse and suicidal ideas. The patient is " nervous/anxious and has insomnia.        OBJECTIVE:  /68  Resp 14  Wt 114.8 kg (253 lb)  LMP 03/10/2017  BMI 42.1 kg/m2    EXAM:   Physical Exam    PHQ Depression Screening 1/8/2017 3/31/2017   Date of PHQ exam (doc flow) (No Data) 3/31/2017   1. Lack of interest/pleasure - 0 - Not at all   2. Feeling down/depressed - 0 - Not at all   PHQ-2 TOTAL SCORE - 0   3. Trouble sleeping - -   4. Decreased energy - -   5. Appetite change - -   6. Feelings of failure - -   7. Trouble concentrating - -   8. Activity level - -   9. Hurting yourself - -   PHQ-9 TOTAL SCORE - -   PHQ-9 Severity Level - -   Functional Impairment - -     TERESA-7 ANXIETY SCREENING 12/29/2016 3/31/2017   TERESA date (doc flow) 12/29/2016 3/31/2017   Nervous, anxious 2 3   Cannot stop worrying 2 3   Worry about different things 2 3   Cannot relax 2 3   Feeling restless 2 3   Easily annoyed/irritated 3 3   Afraid of awful event 2 3   Score 15 21   Severity severe anxiety severe anxiety         ASSESSMENT/PLAN:    ICD-10-CM    1. Generalized anxiety disorder F41.1    2. HYPERTENSION I10 COMP METABOLIC PANEL      COMP METABOLIC PANEL   3. Other specified hypothyroidism E03.8 TSH      TSH   4. Primary osteoarthritis of both knees M17.0 traMADol (ULTRAM) 50 mg tablet        Plan:  25/30 minutes spent counseling.  CBT would be the best thing for her.  It has been advised before but she has been resistant.  In some ways she is already monitoring some of her behaviors and thoughts, but is not yet able to change them.  In its place I want her to read the [facility].  Also advised getting back on the Buspar, she has not really been taking.  Follow up in 4 weeks or so ideally.    Also refilled her ultram today per her request.    Alin Pimentel MD ....................  4/3/2017   11:25 AM

## 2018-01-04 NOTE — TELEPHONE ENCOUNTER
Patient Information     Patient Name MRN Sex Laverne Jeff [id number] Female 1980      Telephone Encounter by Yadira Warner at 2017  4:58 PM     Author:  Yadira Warner Service:  (none) Author Type:  (none)     Filed:  2017  4:58 PM Encounter Date:  2017 Status:  Signed     :  Yadira Warner            Called Pt  Yadira Warner ....................  2017   4:58 PM

## 2018-01-04 NOTE — TELEPHONE ENCOUNTER
Patient Information     Patient Name MRN Laverne Harrison [id number] Female 1980      Telephone Encounter by Tatianna Lora at 3/23/2017  4:02 PM     Author:  Tatianna Lora Service:  (none) Author Type:  (none)     Filed:  3/23/2017  4:02 PM Encounter Date:  3/23/2017 Status:  Signed     :  Tatianna Lora            Left message for patient to call to make an appointment.  Tatianna Lora LPN....................3/23/2017 4:02 PM

## 2018-01-04 NOTE — PROGRESS NOTES
Patient Information     Patient Name MRN Sex Laverne Jeff [id number] Female 1980      Progress Notes by Elda Tadeo NP at 2017 12:45 PM     Author:  Elda Tadeo NP Service:  (none) Author Type:  PHYS- Nurse Practitioner     Filed:  2017  8:25 AM Encounter Date:  2017 Status:  Signed     :  Elda Tadeo NP (PHYS- Nurse Practitioner)            SUBJECTIVE:    Laverne Nguyen is a 37 y.o. female who presents for right rib pain. Was at her birthday party Saturday night, was drinking and celebrating and tripped down steps, doesn't really remember the fall which did not bother her until the next morning when she woke up. Ports right sided anterior rib pain. Denies any difficulty breathing or shortness of breath. Small abrasion on her left knee, no other injuries reported.      HPI    Allergies      Allergen   Reactions     Amoxicillin  Other - Describe In Comment Field     Doesn't work for patient     ,   Family History       Problem   Relation Age of Onset     Hypertension  Brother      Hypertension  Mother      Other  Mother      History of depression     ,   Current Outpatient Prescriptions on File Prior to Visit       Medication  Sig Dispense Refill     busPIRone (BUSPAR) 10 mg tablet TAKE 1 TABLET BY MOUTH THREE TIMES DAILY AS NEEDED FOR ANXIETY. 90 tablet 11     cetirizine (ZYRTEC) 10 mg tablet TAKE 1 TABLET BY MOUTH TWICE DAILY. 180 tablet 1     cholecalciferol (VITAMIN D-3) 2,000 unit capsule 1 tablet daily  0     clonazePAM (KLONOPIN) 1 mg tablet TAKE 1 TABLET BY MOUTH AT BEDTIME 90 tablet 1     cyanocobalamin (VITAMIN B12) 1,000 mcg/mL injection Inject 1 mL intramuscular every 4 weeks. 1 mL 11     EPINEPHrine (EPIPEN) 0.3 mg/0.3 mL injection Inject 0.3 mg intramuscular one time if needed for Allergic Reaction for 1 dose. 2 Each 3     escitalopram oxalate (LEXAPRO) 20 mg tablet TAKE 1 TABLET BY MOUTH EVERY DAY 90 tablet 0     hydrOXYzine pamoate (VISTARIL) 25 mg  "capsule Take 1 capsule by mouth every 8 hours if needed for Anxiety. Do not mix with Klonopin 30 capsule 0     levothyroxine (SYNTHROID) 88 mcg tablet TAKE 1 TABLET BY MOUTH ONCE DAILY. 90 tablet 1     lisinopril-hydrochlorothiazide 20-12.5 mg tablet (PRINIZIDE) TAKE 1 TABLET BY MOUTH EVERY DAY 90 tablet 3     montelukast (SINGULAIR) 10 mg tablet TAKE 1 TABLET BY MOUTH AT BEDTIME 90 tablet 2     multivitamin (MVI) tablet Take 1 tablet by mouth once daily.  0     Syringe with Needle, Disp, (MEDSAVER SYRINGE 3CC/25GX1) 3 mL 25 x 1\" Syrg As directed. 1 Syringe 11     traMADol (ULTRAM) 50 mg tablet Take 1 tablet by mouth 3 times daily if needed. 90 tablet 5     traZODone (DESYREL) 100 mg tablet Take 1 tablet by mouth at bedtime. 90 tablet 3     No current facility-administered medications on file prior to visit.    ,   Current Outpatient Prescriptions:      busPIRone (BUSPAR) 10 mg tablet, TAKE 1 TABLET BY MOUTH THREE TIMES DAILY AS NEEDED FOR ANXIETY., Disp: 90 tablet, Rfl: 11     cetirizine (ZYRTEC) 10 mg tablet, TAKE 1 TABLET BY MOUTH TWICE DAILY., Disp: 180 tablet, Rfl: 1     cholecalciferol (VITAMIN D-3) 2,000 unit capsule, 1 tablet daily, Disp: , Rfl: 0     clonazePAM (KLONOPIN) 1 mg tablet, TAKE 1 TABLET BY MOUTH AT BEDTIME, Disp: 90 tablet, Rfl: 1     cyanocobalamin (VITAMIN B12) 1,000 mcg/mL injection, Inject 1 mL intramuscular every 4 weeks., Disp: 1 mL, Rfl: 11     EPINEPHrine (EPIPEN) 0.3 mg/0.3 mL injection, Inject 0.3 mg intramuscular one time if needed for Allergic Reaction for 1 dose., Disp: 2 Each, Rfl: 3     escitalopram oxalate (LEXAPRO) 20 mg tablet, TAKE 1 TABLET BY MOUTH EVERY DAY, Disp: 90 tablet, Rfl: 0     hydrOXYzine pamoate (VISTARIL) 25 mg capsule, Take 1 capsule by mouth every 8 hours if needed for Anxiety. Do not mix with Klonopin, Disp: 30 capsule, Rfl: 0     levothyroxine (SYNTHROID) 88 mcg tablet, TAKE 1 TABLET BY MOUTH ONCE DAILY., Disp: 90 tablet, Rfl: 1     " "lisinopril-hydrochlorothiazide 20-12.5 mg tablet (PRINIZIDE), TAKE 1 TABLET BY MOUTH EVERY DAY, Disp: 90 tablet, Rfl: 3     montelukast (SINGULAIR) 10 mg tablet, TAKE 1 TABLET BY MOUTH AT BEDTIME, Disp: 90 tablet, Rfl: 2     multivitamin (MVI) tablet, Take 1 tablet by mouth once daily., Disp: , Rfl: 0     Syringe with Needle, Disp, (MEDSAVER SYRINGE 3CC/25GX1) 3 mL 25 x 1\" Syrg, As directed., Disp: 1 Syringe, Rfl: 11     traMADol (ULTRAM) 50 mg tablet, Take 1 tablet by mouth 3 times daily if needed., Disp: 90 tablet, Rfl: 5     traZODone (DESYREL) 100 mg tablet, Take 1 tablet by mouth at bedtime., Disp: 90 tablet, Rfl: 3  Medications have been reviewed by me and are current to the best of my knowledge and ability.,   Past Medical History:     Diagnosis  Date     Cannabis abuse      use found on urine drug screen      Chronic low back pain      Depression with anxiety      ear infections     Recurrent      Gastroesophageal reflux disease      Healthcare maintenance      History of pregnancy      I, para 1.      Hypertension      Morbid obesity (HC) 07    with a body mass index of 51      Tobacco abuse    ,   Patient Active Problem List       Diagnosis  Date Noted     Generalized anxiety disorder  10/05/2015     Chondromalacia of both patellae  09/15/2015     Major depressive disorder, recurrent, mild (HC)  2015     Pain medication agreement  2014     Updated 2016          Degenerative arthritis of knee  11/10/2014     Rectal bleeding  2014     Otosclerosis  2013     Idiopathic angioedema  2012     ANXIETY  2012     HYPOGLYCEMIA  2011     HYPOTHYROIDISM  10/12/2010     B12 DEFICIENCY  09/15/2010     secondary to gastric bypass          MORBID OBESITY       2007, BMI 51          TOBACCO ABUSE       HYPERTENSION       LOW BACK PAIN, CHRONIC     ,   Past Surgical History:      Procedure  Laterality Date     ADENOIDECTOMY       Bilateral tympanostomy  " "01/94     Gastric bypass procedure  05/22/07     for obesity, [place]       LEG/ANKLE SURGERY Right 8/14     FL COLONOSCOPY REMOVE DOMI POLYP LESN HOT BPSY FORCEP  1/10/2014    hyperplastic       FL CREATE EARDRUM OPENING GEN ANESTH  10/86     FL CREATE EARDRUM OPENING GEN ANESTH  1980     Right tympanoplasty  1992     with left PE tube       TUBAL LIGATION  2009    and   Social History        Substance Use Topics          Smoking status:   Current Some Day Smoker      Packs/day:  0.25      Types:  Cigarettes      Smokeless tobacco:   Never Used       Comment: 5 CIGARETTES A WEEK        Alcohol use   Yes      Comment: occasionally 2/month         REVIEW OF SYSTEMS:  Review of Systems   Constitutional: Negative.    HENT: Negative.    Eyes: Negative.    Respiratory: Negative.    Cardiovascular: Positive for chest pain.   Gastrointestinal: Negative.    Genitourinary: Negative.    Musculoskeletal: Positive for joint pain.   Skin: Negative.    Neurological: Negative.    Endo/Heme/Allergies: Negative.    Psychiatric/Behavioral: Negative.        OBJECTIVE:  /80  Pulse 80  Resp 18  Ht 1.651 m (5' 5\")  Wt 117 kg (258 lb)  LMP 03/10/2017  BMI 42.93 kg/m2    EXAM:   Physical Exam   Constitutional: She is oriented to person, place, and time and well-developed, well-nourished, and in no distress.   Cardiovascular: Normal rate, regular rhythm and normal heart sounds.    Pulmonary/Chest: Effort normal and breath sounds normal. No respiratory distress. She has no wheezes. She has no rales. She exhibits tenderness.   Tenderness with palpation anterior lateral  right ribs 5-7   Abdominal: Soft. Bowel sounds are normal.   Musculoskeletal: Normal range of motion.   Neurological: She is alert and oriented to person, place, and time. Gait normal.   Skin: Skin is warm and dry.   4 cm annular blue contusion area over areola area left breast   Psychiatric: Mood, memory, affect and judgment normal.   Nursing note " and vitals reviewed.      ASSESSMENT/PLAN:    ICD-10-CM    1. Rib pain on right side R07.81 XR RIBS RIGHT AND PA CHEST MINIMUM 3 VIEWS   2. Contusion of rib, unspecified laterality, initial encounter S20.219A       PROCEDURE:  XR RIBS RIGHT AND PA CHEST MINIMUM 3 VIEWS     HISTORY: Rib pain on right side.     COMPARISON:  None.     FINDINGS:  The cardiomediastinal contours are normal.  The trachea is midline.  No focal consolidation, effusion or pneumothorax.    No suspicious osseous lesion or subdiaphragmatic free air. No right-sided rib fracture is identified.     IMPRESSION:       No evidence of acute rib fracture or pneumothorax.       Electronically Signed By: Lenny Ba on 4/17/2017 1:34 PM         Plan:  Offered rib belt for support--PRN  Ice, ibuprofen, deep breaths hourly during day to prevent pneumonia/atelectasis    Discussed red flags--RTC PRN

## 2018-01-04 NOTE — TELEPHONE ENCOUNTER
Patient Information     Patient Name MRN Laverne Harrison [id number] Female 1980      Telephone Encounter by Sameera Asher RN at 4/10/2017 11:37 AM     Author:  Sameera Asher RN Service:  (none) Author Type:  (none)     Filed:  4/10/2017 11:43 AM Encounter Date:  2017 Status:  Signed     :  Sameera Asher RN (NURS- Registered Nurse)            Depression-in adults 18 and over  SSRI    Office visit in the past 12 months or as indicated in chart.  Should have clinic visit 1-2 months after initial prescription.    Last visit with JOSE BERUMEN was on: 2017 in [facility]  Next visit with JOSE BERUMEN is on: No future appointment listed with this provider  Next visit with Family Practice is on: No future appointment listed in this department    Max refills 12 months from last office visit or per providers notes.    PHQ Depression Screening 2017 3/31/2017   Date of PHQ exam (doc flow) (No Data) 3/31/2017   1. Lack of interest/pleasure - 0 - Not at all   2. Feeling down/depressed - 0 - Not at all   PHQ-2 TOTAL SCORE - 0   3. Trouble sleeping - -   4. Decreased energy - -   5. Appetite change - -   6. Feelings of failure - -   7. Trouble concentrating - -   8. Activity level - -   9. Hurting yourself - -   PHQ-9 TOTAL SCORE - -   PHQ-9 Severity Level - -   Functional Impairment - -     Prescription refilled per RN Medication Refill Policy.................... Sameera Asher ....................  4/10/2017   11:37 AM

## 2018-01-04 NOTE — TELEPHONE ENCOUNTER
Patient Information     Patient Name MRN Laverne Harrison [id number] Female 1980      Telephone Encounter by Sameera Asher RN at 2017  3:35 PM     Author:  Sameera Asher RN Service:  (none) Author Type:  (none)     Filed:  2017  3:37 PM Encounter Date:  2017 Status:  Signed     :  Sameera Asher RN (NURS- Registered Nurse)            Patient is calling as she recently started Buspar - states that she has been having the headaches for a few weeks and could not place what was going on. States that she noticed the two times she noticed a headache about 20 minutes following taking the medication. The patient is wondering if this is a normal side effect and if there is an alternative medication she can take? Please advise.  Sameera Asher RN............. 2017 3:37 PM

## 2018-01-05 ENCOUNTER — OFFICE VISIT - GICH (OUTPATIENT)
Dept: FAMILY MEDICINE | Facility: OTHER | Age: 38
End: 2018-01-05

## 2018-01-05 ENCOUNTER — HISTORY (OUTPATIENT)
Dept: FAMILY MEDICINE | Facility: OTHER | Age: 38
End: 2018-01-05

## 2018-01-05 DIAGNOSIS — M12.88 OTHER SPECIFIC ARTHROPATHIES, NOT ELSEWHERE CLASSIFIED, OTHER SPECIFIED SITE: ICD-10-CM

## 2018-01-05 DIAGNOSIS — F41.1 GENERALIZED ANXIETY DISORDER: ICD-10-CM

## 2018-01-05 DIAGNOSIS — E03.8 OTHER SPECIFIED HYPOTHYROIDISM: ICD-10-CM

## 2018-01-05 DIAGNOSIS — M17.0 PRIMARY OSTEOARTHRITIS OF BOTH KNEES: ICD-10-CM

## 2018-01-05 DIAGNOSIS — F33.0 MAJOR DEPRESSIVE DISORDER, RECURRENT, MILD (H): ICD-10-CM

## 2018-01-05 DIAGNOSIS — J06.9 ACUTE UPPER RESPIRATORY INFECTION: ICD-10-CM

## 2018-01-05 LAB
A/G RATIO - HISTORICAL: 1.3 (ref 1–2)
ABSOLUTE BASOPHILS - HISTORICAL: 0.1 THOU/CU MM
ABSOLUTE EOSINOPHILS - HISTORICAL: 0.1 THOU/CU MM
ABSOLUTE IMMATURE GRANULOCYTES(METAS,MYELOS,PROS) - HISTORICAL: 0 THOU/CU MM
ABSOLUTE LYMPHOCYTES - HISTORICAL: 2.8 THOU/CU MM (ref 0.9–2.9)
ABSOLUTE MONOCYTES - HISTORICAL: 0.7 THOU/CU MM
ABSOLUTE NEUTROPHILS - HISTORICAL: 5.3 THOU/CU MM (ref 1.7–7)
ALBUMIN SERPL-MCNC: 4.4 G/DL (ref 3.5–5.7)
ALP SERPL-CCNC: 100 IU/L (ref 34–104)
ALT (SGPT) - HISTORICAL: 17 IU/L (ref 7–52)
ANION GAP - HISTORICAL: 13 (ref 5–18)
AST SERPL-CCNC: 21 IU/L (ref 13–39)
BASOPHILS # BLD AUTO: 0.6 %
BILIRUB SERPL-MCNC: 0.5 MG/DL (ref 0.3–1)
BUN SERPL-MCNC: 13 MG/DL (ref 7–25)
BUN/CREAT RATIO - HISTORICAL: 18
CALCIUM SERPL-MCNC: 9 MG/DL (ref 8.6–10.3)
CHLORIDE SERPLBLD-SCNC: 102 MMOL/L (ref 98–107)
CO2 SERPL-SCNC: 24 MMOL/L (ref 21–31)
CREAT SERPL-MCNC: 0.72 MG/DL (ref 0.7–1.3)
EOSINOPHIL NFR BLD AUTO: 1.3 %
ERYTHROCYTE [DISTWIDTH] IN BLOOD BY AUTOMATED COUNT: 11.9 % (ref 11.5–15.5)
GFR IF NOT AFRICAN AMERICAN - HISTORICAL: >60 ML/MIN/1.73M2
GLOBULIN - HISTORICAL: 3.3 G/DL (ref 2–3.7)
GLUCOSE SERPL-MCNC: 100 MG/DL (ref 70–105)
HCT VFR BLD AUTO: 41.5 % (ref 33–51)
HEMOGLOBIN: 14 G/DL (ref 12–16)
IMMATURE GRANULOCYTES(METAS,MYELOS,PROS) - HISTORICAL: 0.4 %
LYMPHOCYTES NFR BLD AUTO: 31.2 % (ref 20–44)
MCH RBC QN AUTO: 32 PG (ref 26–34)
MCHC RBC AUTO-ENTMCNC: 33.7 G/DL (ref 32–36)
MCV RBC AUTO: 95 FL (ref 80–100)
MONOCYTES NFR BLD AUTO: 7.7 %
NEUTROPHILS NFR BLD AUTO: 58.8 % (ref 42–72)
PLATELET # BLD AUTO: 321 THOU/CU MM (ref 140–440)
PMV BLD: 10.1 FL (ref 6.5–11)
POTASSIUM SERPL-SCNC: 4 MMOL/L (ref 3.5–5.1)
PROT SERPL-MCNC: 7.7 G/DL (ref 6.4–8.9)
RED BLOOD COUNT - HISTORICAL: 4.38 MIL/CU MM (ref 4–5.2)
SODIUM SERPL-SCNC: 139 MMOL/L (ref 133–143)
TSH - HISTORICAL: 4.87 UIU/ML (ref 0.34–5.6)
WHITE BLOOD COUNT - HISTORICAL: 9.1 THOU/CU MM (ref 4.5–11)

## 2018-01-05 ASSESSMENT — ANXIETY QUESTIONNAIRES
7. FEELING AFRAID AS IF SOMETHING AWFUL MIGHT HAPPEN: NOT AT ALL
6. BECOMING EASILY ANNOYED OR IRRITABLE: SEVERAL DAYS
GAD7 TOTAL SCORE: 4
1. FEELING NERVOUS, ANXIOUS, OR ON EDGE: SEVERAL DAYS
3. WORRYING TOO MUCH ABOUT DIFFERENT THINGS: SEVERAL DAYS
2. NOT BEING ABLE TO STOP OR CONTROL WORRYING: SEVERAL DAYS
4. TROUBLE RELAXING: NOT AT ALL
5. BEING SO RESTLESS THAT IT IS HARD TO SIT STILL: NOT AT ALL

## 2018-01-05 ASSESSMENT — PATIENT HEALTH QUESTIONNAIRE - PHQ9: SUM OF ALL RESPONSES TO PHQ QUESTIONS 1-9: 6

## 2018-01-05 NOTE — TELEPHONE ENCOUNTER
Patient Information     Patient Name MRN Laverne Harrison [id number] Female 1980      Telephone Encounter by Sameera Asher RN at 2017 10:16 AM     Author:  Sameera Asher RN Service:  (none) Author Type:  (none)     Filed:  2017 10:27 AM Encounter Date:  5/10/2017 Status:  Signed     :  Sameera Asher RN (NURS- Registered Nurse)            Hypothyroidism    Office visit in the past 12 months or per provider note.    Last visit with JOSE BERUMEN was on: 2017 in [facility]  Next visit with JOSE BERUMEN is on: No future appointment listed with this provider  Next visit with Family Practice is on: No future appointment listed in this department    Lab testing requirements:  TSH annually  TSH (uIU/mL)    Date Value   2017 1.52       Max refill for 12 months from last office visit or per provider note.    Prescription refilled per RN Medication Refill Policy.................... Sameera Asher ....................  2017   10:17 AM

## 2018-01-17 ENCOUNTER — COMMUNICATION - GICH (OUTPATIENT)
Dept: FAMILY MEDICINE | Facility: OTHER | Age: 38
End: 2018-01-17

## 2018-01-17 DIAGNOSIS — E03.9 HYPOTHYROIDISM: ICD-10-CM

## 2018-01-19 ENCOUNTER — COMMUNICATION - GICH (OUTPATIENT)
Dept: FAMILY MEDICINE | Facility: OTHER | Age: 38
End: 2018-01-19

## 2018-01-25 VITALS
WEIGHT: 253 LBS | SYSTOLIC BLOOD PRESSURE: 128 MMHG | DIASTOLIC BLOOD PRESSURE: 68 MMHG | RESPIRATION RATE: 14 BRPM | BODY MASS INDEX: 42.1 KG/M2

## 2018-01-25 VITALS
OXYGEN SATURATION: 99 % | BODY MASS INDEX: 43.77 KG/M2 | DIASTOLIC BLOOD PRESSURE: 74 MMHG | SYSTOLIC BLOOD PRESSURE: 132 MMHG | HEART RATE: 80 BPM | HEIGHT: 65 IN | WEIGHT: 263 LBS | BODY MASS INDEX: 43.49 KG/M2 | DIASTOLIC BLOOD PRESSURE: 84 MMHG | WEIGHT: 261 LBS | HEART RATE: 84 BPM | TEMPERATURE: 99.3 F | SYSTOLIC BLOOD PRESSURE: 126 MMHG

## 2018-01-25 VITALS
DIASTOLIC BLOOD PRESSURE: 80 MMHG | WEIGHT: 258 LBS | HEART RATE: 80 BPM | RESPIRATION RATE: 18 BRPM | SYSTOLIC BLOOD PRESSURE: 140 MMHG | HEIGHT: 65 IN | BODY MASS INDEX: 42.99 KG/M2

## 2018-01-25 VITALS
DIASTOLIC BLOOD PRESSURE: 70 MMHG | HEIGHT: 65 IN | OXYGEN SATURATION: 97 % | BODY MASS INDEX: 45.15 KG/M2 | HEART RATE: 72 BPM | SYSTOLIC BLOOD PRESSURE: 130 MMHG | TEMPERATURE: 98.4 F | WEIGHT: 271 LBS

## 2018-01-25 VITALS
SYSTOLIC BLOOD PRESSURE: 124 MMHG | BODY MASS INDEX: 41.34 KG/M2 | DIASTOLIC BLOOD PRESSURE: 70 MMHG | RESPIRATION RATE: 16 BRPM | WEIGHT: 257.2 LBS

## 2018-01-25 VITALS
WEIGHT: 258 LBS | DIASTOLIC BLOOD PRESSURE: 96 MMHG | SYSTOLIC BLOOD PRESSURE: 130 MMHG | HEART RATE: 72 BPM | BODY MASS INDEX: 42.99 KG/M2 | HEIGHT: 65 IN

## 2018-01-27 ASSESSMENT — ANXIETY QUESTIONNAIRES
GAD7 TOTAL SCORE: 7
GAD7 TOTAL SCORE: 21
GAD7 TOTAL SCORE: 7

## 2018-01-27 ASSESSMENT — PATIENT HEALTH QUESTIONNAIRE - PHQ9
SUM OF ALL RESPONSES TO PHQ QUESTIONS 1-9: 0
SUM OF ALL RESPONSES TO PHQ QUESTIONS 1-9: 7

## 2018-02-06 ENCOUNTER — COMMUNICATION - GICH (OUTPATIENT)
Dept: FAMILY MEDICINE | Facility: OTHER | Age: 38
End: 2018-02-06

## 2018-02-06 DIAGNOSIS — F41.1 GENERALIZED ANXIETY DISORDER: ICD-10-CM

## 2018-02-09 VITALS
RESPIRATION RATE: 16 BRPM | WEIGHT: 270.8 LBS | SYSTOLIC BLOOD PRESSURE: 126 MMHG | DIASTOLIC BLOOD PRESSURE: 66 MMHG | BODY MASS INDEX: 44.72 KG/M2

## 2018-02-11 ASSESSMENT — ANXIETY QUESTIONNAIRES: GAD7 TOTAL SCORE: 4

## 2018-02-11 ASSESSMENT — PATIENT HEALTH QUESTIONNAIRE - PHQ9: SUM OF ALL RESPONSES TO PHQ QUESTIONS 1-9: 6

## 2018-02-12 NOTE — TELEPHONE ENCOUNTER
Patient Information     Patient Name MRN Laverne Harrison [id number] Female 1980      Telephone Encounter by Kt Murphy MD at 2017  3:22 PM     Author:  Kt Murphy MD Service:  (none) Author Type:  Physician     Filed:  2017  3:23 PM Encounter Date:  2017 Status:  Signed     :  Kt Murphy MD (Physician)            She should wait for PCP and discuss it with him

## 2018-02-12 NOTE — TELEPHONE ENCOUNTER
Patient Information     Patient Name MRN Laverne Harrison [id number] Female 1980      Telephone Encounter by Ange Person at 2017  1:50 PM     Author:  Ange Person Service:  (none) Author Type:  (none)     Filed:  2017  1:54 PM Encounter Date:  2017 Status:  Signed     :  Ange Person            1:51 PM  Reason for call: Patient would like to speak with TJP to discuss weaning off of some of her medications, patient would not discuss which ones, will discuss with TJP.      Was an appointment offered for this call? n/a    Preferred method for responding to this message: telephone, best time to reach caller is anytime.    If we cannot reach you directly, may we leave a detailed response at the number you provided? yes    Can this message wait until your PCP/provider returns, if unavailable today? no    Ange Person ....................  2017   1:54 PM

## 2018-02-12 NOTE — TELEPHONE ENCOUNTER
"Patient Information     Patient Name MRN Laverne Harrison [id number] Female 1980      Telephone Encounter by Amita Merino at 2017  2:36 PM     Author:  Amita Merino Service:  (none) Author Type:  (none)     Filed:  2017  2:42 PM Encounter Date:  2017 Status:  Signed     :  Amita Merino            Patient states that she would like to wean off of some medications. She would like to start with her night time medications first. (Trazodone and Klonopin-has been taking these for years). She would like to replace with an all natural \"ashwaganda\" supplement for stress and edginess. And then Magnesium 400 mg tablet to help with sleep.     Wants to \"wean off\" Zyrtec and and Singulair as well.     If everything works out, she would eventually like to wean off Lexapro and Tramadol. But wants to start with the HS meds. Wondering if another provider will advise. Does absolutely not want to wait for PCP return on Tuesday.   Does this need to wait for PCP? Or just needs appointment?  Amita Merino LPN...................2017   2:40 PM         "

## 2018-02-12 NOTE — TELEPHONE ENCOUNTER
Patient Information     Patient Name MRN Laverne Harrison [id number] Female 1980      Telephone Encounter by Amita Merino at 2017  3:30 PM     Author:  Amita Merino Service:  (none) Author Type:  (none)     Filed:  2017  3:30 PM Encounter Date:  2017 Status:  Signed     :  Amita Merino            Patient was notified. OK for PCP return per patient.   Amita Merino LPN ....................  2017   3:30 PM

## 2018-02-13 NOTE — TELEPHONE ENCOUNTER
Patient Information     Patient Name MRN Laverne Harrison [id number] Female 1980      Telephone Encounter by Alin Pimentel MD at 2017  3:35 PM     Author:  Alin Pimentel MD Service:  (none) Author Type:  Physician     Filed:  2017  3:35 PM Encounter Date:  2017 Status:  Signed     :  Alin Pimentel MD (Physician)            She can taper off any of the medications she wises.  Would cut does in half for 5 days then stop.  Alin Pimentel MD ....................  2017   3:35 PM

## 2018-02-13 NOTE — NURSING NOTE
Patient Information     Patient Name MRN Sex Laverne Jeff [id number] Female 1980      Nursing Note by Yadira Warner at 2018  2:45 PM     Author:  Yadira Warner Service:  (none) Author Type:  (none)     Filed:  2018  4:22 PM Encounter Date:  2018 Status:  Signed     :  Yadira Warner            Time out was done with name,date of birth what is being injected and where  Yadira Warner ....................  2018   4:21 PM      Lidocaine 1% NDC 54500-364-41 Lot number [id number]

## 2018-02-13 NOTE — TELEPHONE ENCOUNTER
Patient Information     Patient Name MRN Sex Laverne Jeff [id number] Female 1980      Telephone Encounter by Sammie Hanson at 2018  1:44 PM     Author:  Sammie Hanson Service:  (none) Author Type:  NURS- Student Practical Nurse     Filed:  2018  1:50 PM Encounter Date:  2018 Status:  Signed     :  Sammie Hanson (NURS- Student Practical Nurse)            Patient had labs done on the . Told patient all labs look normal. Sammie Hanson LPN .............2018  1:45 PM

## 2018-02-13 NOTE — TELEPHONE ENCOUNTER
Patient Information     Patient Name MRN Laverne Harrison [id number] Female 1980      Telephone Encounter by Yadira Warner at 2018  2:44 PM     Author:  Yadira Warner Service:  (none) Author Type:  (none)     Filed:  2018  2:44 PM Encounter Date:  2018 Status:  Signed     :  Yadira Warner            Faxed Lanie Warner ....................  2018   2:44 PM

## 2018-02-13 NOTE — TELEPHONE ENCOUNTER
Patient Information     Patient Name MRN Laverne Harrison [id number] Female 1980      Telephone Encounter by Amita Merino at 2017  4:10 PM     Author:  Amita Merino Service:  (none) Author Type:  (none)     Filed:  2017  4:10 PM Encounter Date:  2017 Status:  Signed     :  Amita Merino            Patient was notified.  Amita Merino LPN ....................  2017   4:10 PM

## 2018-02-13 NOTE — NURSING NOTE
Patient Information     Patient Name MRN Laverne Harrison [id number] Female 1980      Nursing Note by Yadira Warner at 2018  2:45 PM     Author:  Yadira Warner Service:  (none) Author Type:  (none)     Filed:  2018  3:09 PM Encounter Date:  2018 Status:  Signed     :  Yadira Warner            Coming in to get shots in her knees, back issues and what is next, has had a funny type of cold,sweats, freezing  Yadira Warner ....................  2018   2:57 PM'

## 2018-02-13 NOTE — TELEPHONE ENCOUNTER
Patient Information     Patient Name MRN Sex Laverne Jeff [id number] Female 1980      Telephone Encounter by Valorie Castorena RN at 2018  2:30 PM     Author:  Valorie Castorena RN Service:  (none) Author Type:  NURS- Registered Nurse     Filed:  2018  2:32 PM Encounter Date:  2018 Status:  Signed     :  Valorie Castorena RN (NURS- Registered Nurse)            Hypothyroidism    Office visit in the past 12 months or per provider note.    Last visit with JOSE BERUMEN was on: 2018 in [facility]  Next visit with JOSE BERUMEN is on: No future appointment listed with this provider  Next visit with Family Practice is on: No future appointment listed in this department    Lab testing requirements:  TSH annually  TSH (uIU/mL)    Date Value   2018 4.87       Max refill for 12 months from last office visit or per provider note.    Prescription refilled per RN Medication Refill Policy.................... Valorie Castorena RN ....................  2018   2:32 PM
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
## 2018-02-13 NOTE — TELEPHONE ENCOUNTER
Patient Information     Patient Name MRN Laverne Harrison [id number] Female 1980      Telephone Encounter by Sammie Hanson at 2018  1:23 PM     Author:  Sammie Hanson Service:  (none) Author Type:  NURS- Student Practical Nurse     Filed:  2018  1:25 PM Encounter Date:  2018 Status:  Signed     :  Sammie Hanson (NURS- Student Practical Nurse)            Left message to call back.  Sammie Hanson LPN ....................  2018   1:24 PM

## 2018-02-27 ENCOUNTER — TELEPHONE (OUTPATIENT)
Dept: FAMILY MEDICINE | Facility: OTHER | Age: 38
End: 2018-02-27

## 2018-02-27 DIAGNOSIS — F51.01 PRIMARY INSOMNIA: Primary | ICD-10-CM

## 2018-02-27 NOTE — TELEPHONE ENCOUNTER
Is she looking for a new prescription for it?  If so, find out what one she is talking about.  Alin Pimentel MD on 2/27/2018 at 3:25 PM

## 2018-02-27 NOTE — TELEPHONE ENCOUNTER
The last time she was seen she stopped the Trazodone, now she would like to go back on it again  Yadira Warner LPN on 2/27/2018 at 3:59 PM

## 2018-02-28 PROBLEM — F51.01 PRIMARY INSOMNIA: Status: ACTIVE | Noted: 2018-02-28

## 2018-02-28 RX ORDER — TRAZODONE HYDROCHLORIDE 50 MG/1
50 TABLET, FILM COATED ORAL
Qty: 90 TABLET | Refills: 3 | Status: SHIPPED | OUTPATIENT
Start: 2018-02-28 | End: 2018-10-09 | Stop reason: DRUGHIGH

## 2018-03-04 ENCOUNTER — HEALTH MAINTENANCE LETTER (OUTPATIENT)
Age: 38
End: 2018-03-04

## 2018-03-07 PROBLEM — M47.816 LUMBAR FACET JOINT SYNDROME: Status: ACTIVE | Noted: 2018-01-05

## 2018-03-07 PROBLEM — Z72.0 TOBACCO ABUSE: Status: ACTIVE | Noted: 2018-03-07

## 2018-03-07 PROBLEM — I10 HYPERTENSION: Status: ACTIVE | Noted: 2018-03-07

## 2018-03-07 PROBLEM — E66.01 MORBID OBESITY (H): Status: ACTIVE | Noted: 2018-03-07

## 2018-03-07 RX ORDER — DIPHENOXYLATE HYDROCHLORIDE AND ATROPINE SULFATE 2.5; .025 MG/1; MG/1
1 TABLET ORAL
COMMUNITY
Start: 2014-11-07 | End: 2018-06-28

## 2018-03-07 RX ORDER — CLONAZEPAM 1 MG/1
1 TABLET ORAL
COMMUNITY
Start: 2017-07-21 | End: 2018-06-15

## 2018-03-07 RX ORDER — TRAMADOL HYDROCHLORIDE 50 MG/1
50 TABLET ORAL
COMMUNITY
Start: 2017-10-13 | End: 2018-03-08

## 2018-03-07 RX ORDER — LISINOPRIL AND HYDROCHLOROTHIAZIDE 12.5; 2 MG/1; MG/1
1 TABLET ORAL
COMMUNITY
Start: 2017-09-22 | End: 2018-04-21

## 2018-03-07 RX ORDER — EPINEPHRINE 0.3 MG/.3ML
0.3 INJECTION SUBCUTANEOUS
COMMUNITY
Start: 2012-11-07 | End: 2018-06-28

## 2018-03-07 RX ORDER — ACETAMINOPHEN 160 MG
TABLET,DISINTEGRATING ORAL
COMMUNITY
Start: 2012-10-22 | End: 2018-06-28

## 2018-03-07 RX ORDER — METRONIDAZOLE 10 MG/G
GEL TOPICAL
COMMUNITY
Start: 2017-07-21 | End: 2018-06-28

## 2018-03-07 RX ORDER — CYANOCOBALAMIN 1000 UG/ML
INJECTION, SOLUTION INTRAMUSCULAR; SUBCUTANEOUS
COMMUNITY
Start: 2017-11-17 | End: 2018-04-10

## 2018-03-07 RX ORDER — POLYETHYLENE GLYCOL 3350 17 G
POWDER IN PACKET (EA) ORAL
COMMUNITY
Start: 2017-09-23 | End: 2019-01-02

## 2018-03-07 RX ORDER — BUSPIRONE HYDROCHLORIDE 10 MG/1
TABLET ORAL
COMMUNITY
Start: 2017-09-15 | End: 2018-06-28

## 2018-03-07 RX ORDER — LEVOTHYROXINE SODIUM 88 UG/1
88 TABLET ORAL
COMMUNITY
Start: 2017-05-11 | End: 2018-06-28

## 2018-03-08 ENCOUNTER — OFFICE VISIT (OUTPATIENT)
Dept: FAMILY MEDICINE | Facility: OTHER | Age: 38
End: 2018-03-08
Attending: FAMILY MEDICINE
Payer: OTHER MISCELLANEOUS

## 2018-03-08 VITALS
BODY MASS INDEX: 45.87 KG/M2 | SYSTOLIC BLOOD PRESSURE: 124 MMHG | DIASTOLIC BLOOD PRESSURE: 70 MMHG | WEIGHT: 277.8 LBS | RESPIRATION RATE: 16 BRPM

## 2018-03-08 DIAGNOSIS — S33.5XXA LUMBAR SPRAIN, INITIAL ENCOUNTER: Primary | ICD-10-CM

## 2018-03-08 PROCEDURE — 99213 OFFICE O/P EST LOW 20 MIN: CPT | Performed by: FAMILY MEDICINE

## 2018-03-08 RX ORDER — TRAMADOL HYDROCHLORIDE 50 MG/1
50 TABLET ORAL EVERY 6 HOURS PRN
Qty: 60 TABLET | Refills: 0 | Status: SHIPPED | OUTPATIENT
Start: 2018-03-08 | End: 2018-05-03

## 2018-03-08 ASSESSMENT — ANXIETY QUESTIONNAIRES
2. NOT BEING ABLE TO STOP OR CONTROL WORRYING: NOT AT ALL
6. BECOMING EASILY ANNOYED OR IRRITABLE: NOT AT ALL
3. WORRYING TOO MUCH ABOUT DIFFERENT THINGS: NOT AT ALL
GAD7 TOTAL SCORE: 0
7. FEELING AFRAID AS IF SOMETHING AWFUL MIGHT HAPPEN: NOT AT ALL
1. FEELING NERVOUS, ANXIOUS, OR ON EDGE: NOT AT ALL
5. BEING SO RESTLESS THAT IT IS HARD TO SIT STILL: NOT AT ALL
IF YOU CHECKED OFF ANY PROBLEMS ON THIS QUESTIONNAIRE, HOW DIFFICULT HAVE THESE PROBLEMS MADE IT FOR YOU TO DO YOUR WORK, TAKE CARE OF THINGS AT HOME, OR GET ALONG WITH OTHER PEOPLE: NOT DIFFICULT AT ALL

## 2018-03-08 ASSESSMENT — ENCOUNTER SYMPTOMS
FEVER: 0
PARESTHESIAS: 0
WEAKNESS: 0
BACK PAIN: 1

## 2018-03-08 ASSESSMENT — PAIN SCALES - GENERAL: PAINLEVEL: SEVERE PAIN (6)

## 2018-03-08 ASSESSMENT — PATIENT HEALTH QUESTIONNAIRE - PHQ9: 5. POOR APPETITE OR OVEREATING: NOT AT ALL

## 2018-03-08 NOTE — PROGRESS NOTES
".  SUBJECTIVE:   Laverne Nguyen is a 37 year old female who presents to clinic today for the following health issues:    HPI    Work injury thie morning at 9:30.  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  Felt a sudden pulling in left lower back.  No radiating symptoms into legs.  No bladder symptoms.  Has known lumbar degenerative disease, but this is different, is lateral to it.  Has tried tylenol without much relief in the past.  Cannot take NSAIDS due to prior surgery.  On ultram, helps a little.    Current Outpatient Prescriptions   Medication Sig Dispense Refill     traMADol (ULTRAM) 50 MG tablet Take 1 tablet (50 mg) by mouth every 6 hours as needed for moderate pain 60 tablet 0     busPIRone (BUSPAR) 10 MG tablet TK 1 T PO TID PRF ANXIETY.       Cholecalciferol (VITAMIN D3) 2000 UNITS CAPS 1 tablet daily       clonazePAM (KLONOPIN) 1 MG tablet Take 1 mg by mouth       cyanocobalamin (VITAMIN B12) 1000 MCG/ML injection        EPINEPHrine (EPIPEN/ADRENACLICK/OR ANY BX GENERIC EQUIV) 0.3 MG/0.3ML injection 2-pack Inject 0.3 mg into the muscle       levothyroxine (SYNTHROID/LEVOTHROID) 88 MCG tablet Take 88 mcg by mouth       metroNIDAZOLE (METROGEL) 1 % gel        lisinopril-hydrochlorothiazide (PRINZIDE/ZESTORETIC) 20-12.5 MG per tablet Take 1 tablet by mouth       nicotine polacrilex (COMMIT) 2 MG lozenge TK 1 LOZENGE PO PRF TOBACCO CESSATION       Multiple Vitamin (MULTI-VITAMINS) TABS Take 1 tablet by mouth       syringe/needle, disp, (MEDSAVER SYRINGE) 25G X 1\" 3 ML MISC As directed.       traZODone (DESYREL) 50 MG tablet Take 1 tablet (50 mg) by mouth nightly as needed for sleep 90 tablet 3     Allergies   Allergen Reactions     Amoxicillin Other (See Comments)     Pt reports it is ineffective for her personally       Review of Systems   Constitutional: Negative for fever.   Musculoskeletal: Positive for back pain.   Neurological: Negative for weakness and " paresthesias.        OBJECTIVE:     /70 (BP Location: Right arm, Patient Position: Sitting, Cuff Size: Adult Regular)  Resp 16  Wt 277 lb 12.8 oz (126 kg)  LMP 02/26/2017  BMI 45.87 kg/m2  Body mass index is 45.87 kg/(m^2).  Physical Exam   Constitutional: She is oriented to person, place, and time. She appears well-developed and well-nourished. No distress.   Musculoskeletal:   Moderate loss of lumbar flexion, neg straight leg raise.  Lower extremity strength is normal.  Mild pain along the left perispinous muscles and into left SI area.   Neurological: She is alert and oriented to person, place, and time.   Skin: She is not diaphoretic.   Psychiatric: She has a normal mood and affect. Her behavior is normal. Thought content normal.       Diagnostic Test Results:  none     ASSESSMENT/PLAN:         (S33.5XXA) Lumbar sprain, initial encounter  (primary encounter diagnosis)  Comment: new  Plan: PHYSICAL THERAPY REFERRAL, traMADol (ULTRAM) 50        MG tablet        This is not related to her degenerative disease, is not joint related on exam location.  Muscular.  Back Owner's manual given, lifting restriction to 20#, start therapy, 2 week's of ultram given at increased dose of up to 4 daily.        Alin Pimentel MD  [facility]

## 2018-03-08 NOTE — MR AVS SNAPSHOT
"              After Visit Summary   3/8/2018    Laverne Nguyen    MRN: [id number]           Patient Information     Date Of Birth          1980        Visit Information        Provider Department      3/8/2018 3:30 PM Alin Pimentel MD [facility]        Today's Diagnoses     Lumbar sprain, initial encounter    -  1       Follow-ups after your visit        Additional Services     PHYSICAL THERAPY REFERRAL       *This therapy referral will be filtered to a centralized scheduling office at [facility] and the patient will receive a call to schedule an appointment at a [place] location most convenient for them. *     [facility] provides Physical Therapy evaluation and treatment and many specialty services across the [place] system.  If requesting a specialty program, please choose from the list below.    If you have not heard from the scheduling office within 2 business days, please call [phone] for all locations, with the exception of [place], please call [phone] and [facility], please call [phone]  Treatment: Evaluation & Treatment  Special Instructions/Modalities: None  Special Programs:      Please be aware that coverage of these services is subject to the terms and limitations of your health insurance plan.  Call member services at your health plan with any benefit or coverage questions.      **Note to Provider:  If you are referring outside of [place] for the therapy appointment, please list the name of the location in the \"special instructions\" above, print the referral and give to the patient to schedule the appointment.                  Follow-up notes from your care team     Return in about 2 weeks (around 3/22/2018).      Who to contact     If you have questions or need follow up information about today's clinic visit or your schedule please contact [facility] directly at [phone].  Normal or " "non-critical lab and imaging results will be communicated to you by MyChart, letter or phone within 4 business days after the clinic has received the results. If you do not hear from us within 7 days, please contact the clinic through Trainfoxt or phone. If you have a critical or abnormal lab result, we will notify you by phone as soon as possible.  Submit refill requests through [employer] or call your pharmacy and they will forward the refill request to us. Please allow 3 business days for your refill to be completed.          Additional Information About Your Visit        [employer] Information     [employer] lets you send messages to your doctor, view your test results, renew your prescriptions, schedule appointments and more. To sign up, go to [email or web address] . Click on \"Log in\" on the left side of the screen, which will take you to the Welcome page. Then click on \"Sign up Now\" on the right side of the page.     You will be asked to enter the access code listed below, as well as some personal information. Please follow the directions to create your username and password.     Your access code is: [email or web address]  Expires: 2018  3:32 PM     Your access code will  in 90 days. If you need help or a new code, please call your [place] clinic or [phone].        Care EveryWhere ID     This is your Care EveryWhere ID. This could be used by other organizations to access your [place] medical records  [id number]        Your Vitals Were     Respirations Last Period BMI (Body Mass Index)             16 2017 45.87 kg/m2          Blood Pressure from Last 3 Encounters:   18 124/70   18 126/66   17 130/70    Weight from Last 3 Encounters:   18 277 lb 12.8 oz (126 kg)   18 270 lb 12.8 oz (122.8 kg)   17 271 lb (122.9 kg)              We Performed the Following     PHYSICAL THERAPY REFERRAL          Today's Medication Changes          These changes are accurate as of 3/8/18  " 4:22 PM.  If you have any questions, ask your nurse or doctor.               These medicines have changed or have updated prescriptions.        Dose/Directions    traMADol 50 MG tablet   Commonly known as:  ULTRAM   This may have changed:    - when to take this  - reasons to take this   Used for:  Lumbar sprain, initial encounter   Changed by:  Alin Pimentel MD        Dose:  50 mg   Take 1 tablet (50 mg) by mouth every 6 hours as needed for moderate pain   Quantity:  60 tablet   Refills:  0            Where to get your medicines      Some of these will need a paper prescription and others can be bought over the counter.  Ask your nurse if you have questions.     Bring a paper prescription for each of these medications     traMADol 50 MG tablet                Primary Care Provider Office Phone # Fax #    Alin Pimentel [phone] [address] [facility] [address]        Equal Access to Services     [facility]: Hadii christal cuellar hadasho Sonicholas, waaxda luqadaha, qaybta kaalmada isa, jose will . So [facility] [phone].    ATENCIÓN: Si habla español, tiene a bronson disposición servicios gratuitos de asistencia lingüística. [facility] [phone].    We comply with applicable federal civil rights laws and [place] laws. We do not discriminate on the basis of race, color, national origin, age, disability, sex, sexual orientation, or gender identity.            Thank you!     Thank you for choosing [facility] [place] [facility]  for your care. Our goal is always to provide you with excellent care. Hearing back from our patients is one way we can continue to improve our services. Please take a few minutes to complete the written survey that you may receive in the mail after your visit with us. Thank you!             Your Updated Medication List - Protect others around you: Learn how to safely use, store and throw away your medicines at  "www.disposemymeds.org.          This list is accurate as of 3/8/18  4:22 PM.  Always use your most recent med list.                   Brand Name Dispense Instructions for use Diagnosis    busPIRone 10 MG tablet    BUSPAR     TK 1 T PO TID PRF ANXIETY.        clonazePAM 1 MG tablet    klonoPIN     Take 1 mg by mouth        cyanocobalamin 1000 MCG/ML injection    VITAMIN B12          EPINEPHrine 0.3 MG/0.3ML injection 2-pack    EPIPEN/ADRENACLICK/or ANY BX GENERIC EQUIV     Inject 0.3 mg into the muscle        levothyroxine 88 MCG tablet    SYNTHROID/LEVOTHROID     Take 88 mcg by mouth        lisinopril-hydrochlorothiazide 20-12.5 MG per tablet    PRINZIDE/ZESTORETIC     Take 1 tablet by mouth        MEDSAVER SYRINGE 25G X 1\" 3 ML Misc   Generic drug:  syringe/needle (disp)      As directed.        metroNIDAZOLE 1 % gel    METROGEL          MULTI-VITAMINS Tabs      Take 1 tablet by mouth        nicotine polacrilex 2 MG lozenge    COMMIT     TK 1 LOZENGE PO PRF TOBACCO CESSATION        traMADol 50 MG tablet    ULTRAM    60 tablet    Take 1 tablet (50 mg) by mouth every 6 hours as needed for moderate pain    Lumbar sprain, initial encounter       traZODone 50 MG tablet    DESYREL    90 tablet    Take 1 tablet (50 mg) by mouth nightly as needed for sleep    Primary insomnia       vitamin D3 2000 UNITS Caps      1 tablet daily          "

## 2018-03-09 ENCOUNTER — DOCUMENTATION ONLY (OUTPATIENT)
Dept: FAMILY MEDICINE | Facility: OTHER | Age: 38
End: 2018-03-09

## 2018-03-09 ASSESSMENT — ANXIETY QUESTIONNAIRES: GAD7 TOTAL SCORE: 0

## 2018-03-20 ENCOUNTER — TELEPHONE (OUTPATIENT)
Dept: FAMILY MEDICINE | Facility: OTHER | Age: 38
End: 2018-03-20

## 2018-03-20 NOTE — TELEPHONE ENCOUNTER
Would you complete the work status paperwork or do you want her to be seen to complete that with you? Please advise.  Sveta Siu ....................  3/20/2018   4:16 PM

## 2018-03-20 NOTE — TELEPHONE ENCOUNTER
FYI:  Patient states that she is feeling good but would still like to continue on the tramadol.  Sveta Siu ....................  3/20/2018   3:44 PM

## 2018-03-29 DIAGNOSIS — F51.01 PRIMARY INSOMNIA: Primary | ICD-10-CM

## 2018-04-03 RX ORDER — TRAZODONE HYDROCHLORIDE 100 MG/1
TABLET ORAL
Qty: 90 TABLET | Refills: 3 | Status: SHIPPED | OUTPATIENT
Start: 2018-04-03 | End: 2018-06-28

## 2018-04-10 DIAGNOSIS — E53.8 B12 DEFICIENCY: Primary | ICD-10-CM

## 2018-04-12 RX ORDER — CYANOCOBALAMIN 1000 UG/ML
INJECTION, SOLUTION INTRAMUSCULAR; SUBCUTANEOUS
Qty: 1 ML | Refills: 3 | Status: SHIPPED | OUTPATIENT
Start: 2018-04-12 | End: 2018-06-28

## 2018-04-12 NOTE — TELEPHONE ENCOUNTER
Prescription approved per [facility] Refill Protocol.  Luz Marina Ross RN.............................4/12/2018 2:01 PM

## 2018-04-21 DIAGNOSIS — I10 ESSENTIAL HYPERTENSION: Primary | ICD-10-CM

## 2018-04-21 DIAGNOSIS — Z91.09 ENVIRONMENTAL ALLERGIES: ICD-10-CM

## 2018-04-25 RX ORDER — CETIRIZINE HYDROCHLORIDE 10 MG/1
TABLET ORAL
Qty: 90 TABLET | Refills: 3 | Status: SHIPPED | OUTPATIENT
Start: 2018-04-25 | End: 2018-06-28

## 2018-04-25 RX ORDER — LISINOPRIL AND HYDROCHLOROTHIAZIDE 12.5; 2 MG/1; MG/1
TABLET ORAL
Qty: 90 TABLET | Refills: 3 | Status: SHIPPED | OUTPATIENT
Start: 2018-04-25 | End: 2018-06-28

## 2018-04-25 NOTE — TELEPHONE ENCOUNTER
[facility] pharmacy and pt request refill Rxs for cetirizine (Zyrtec) and lisinopril / hctz (Prinzide / Zestoretic).  Both the antihistamine protocol passed, and the diuretic protocol passed.  Yet, cetirizine (Zyretc) is not listed on pt's current medication list.  Will refill the Rx request for  lisinopril / hctz (Prinzide / Zestoretic).  This RN telephoned pt regarding whether or not she continues to take cetirizine (Zyrtec). Pt reports she continues to take Zyrtec for occassional environmental allergy flair ups and would like to continue taking it.   Will route Rx refill request for cetirizine (Zytrec) for review and consideration of refill.  Pt's last exam with PCP Dr. DREW Pimentel was on 3-8-18.  .Unable to complete prescription refill per RN Medication Refill Policy.................... Valorie Patterson ....................  4/25/2018   12:22 PM

## 2018-05-03 DIAGNOSIS — S33.5XXA LUMBAR SPRAIN, INITIAL ENCOUNTER: ICD-10-CM

## 2018-05-07 NOTE — TELEPHONE ENCOUNTER
,  Routing refill request to provider for review/approval because:  Drug not on the [facility] refill protocol   Medication last filled

## 2018-05-08 RX ORDER — TRAMADOL HYDROCHLORIDE 50 MG/1
TABLET ORAL
Qty: 90 TABLET | Refills: 0 | Status: SHIPPED | OUTPATIENT
Start: 2018-05-08 | End: 2018-06-15

## 2018-05-08 NOTE — TELEPHONE ENCOUNTER
,  Routing refill request to provider for review/approval because:  Drug not on the [facility] refill protocol   Medication last filled 03/08/2018 #60.  Pt was last seen 03/08/2018.  Please review and advise or sign if appropriate    Luz Marina Ross RN.............................5/8/2018 9:37 AM

## 2018-06-13 DIAGNOSIS — S33.5XXA LUMBAR SPRAIN, INITIAL ENCOUNTER: ICD-10-CM

## 2018-06-15 DIAGNOSIS — F41.1 GENERALIZED ANXIETY DISORDER: Primary | ICD-10-CM

## 2018-06-15 RX ORDER — TRAMADOL HYDROCHLORIDE 50 MG/1
TABLET ORAL
Qty: 90 TABLET | Refills: 0 | OUTPATIENT
Start: 2018-06-15

## 2018-06-15 RX ORDER — CLONAZEPAM 1 MG/1
TABLET ORAL
Qty: 90 TABLET | Refills: 0 | OUTPATIENT
Start: 2018-06-15

## 2018-06-15 RX ORDER — TRAMADOL HYDROCHLORIDE 50 MG/1
50 TABLET ORAL EVERY 6 HOURS PRN
Qty: 30 TABLET | Refills: 0 | Status: SHIPPED | OUTPATIENT
Start: 2018-06-15 | End: 2018-06-28

## 2018-06-15 RX ORDER — CLONAZEPAM 1 MG/1
TABLET ORAL
Qty: 90 TABLET | Refills: 0 | Status: SHIPPED | OUTPATIENT
Start: 2018-06-15 | End: 2018-06-28

## 2018-06-15 NOTE — TELEPHONE ENCOUNTER
After birth date was verified, spoke with Laverne. She stated that she scheduled a medication management appointment for 6/28 with Dr. Pimentel. Patient is requesting a supply of medication to get her until her next appointment.    --Klonopin 1 mg  --Ultram 50 mg      Please advise.        Vince Chew, PIERRE 06/15/18 10:49 AM

## 2018-06-15 NOTE — TELEPHONE ENCOUNTER
After birth date was verified, spoke with patient and let her know the below information.      Vince Chew LPN 06/15/18 11:05 AM

## 2018-06-28 ENCOUNTER — OFFICE VISIT (OUTPATIENT)
Dept: FAMILY MEDICINE | Facility: OTHER | Age: 38
End: 2018-06-28
Attending: FAMILY MEDICINE
Payer: COMMERCIAL

## 2018-06-28 VITALS
WEIGHT: 276 LBS | RESPIRATION RATE: 16 BRPM | BODY MASS INDEX: 45.58 KG/M2 | SYSTOLIC BLOOD PRESSURE: 142 MMHG | DIASTOLIC BLOOD PRESSURE: 76 MMHG

## 2018-06-28 DIAGNOSIS — E53.8 B12 DEFICIENCY: ICD-10-CM

## 2018-06-28 DIAGNOSIS — Z91.09 ENVIRONMENTAL ALLERGIES: ICD-10-CM

## 2018-06-28 DIAGNOSIS — E03.8 OTHER SPECIFIED HYPOTHYROIDISM: ICD-10-CM

## 2018-06-28 DIAGNOSIS — F51.01 PRIMARY INSOMNIA: ICD-10-CM

## 2018-06-28 DIAGNOSIS — S33.5XXA LUMBAR SPRAIN, INITIAL ENCOUNTER: ICD-10-CM

## 2018-06-28 DIAGNOSIS — M17.0 PRIMARY OSTEOARTHRITIS OF BOTH KNEES: ICD-10-CM

## 2018-06-28 DIAGNOSIS — F41.1 GENERALIZED ANXIETY DISORDER: ICD-10-CM

## 2018-06-28 DIAGNOSIS — I10 ESSENTIAL HYPERTENSION: Primary | ICD-10-CM

## 2018-06-28 DIAGNOSIS — L71.9 ROSACEA: ICD-10-CM

## 2018-06-28 PROCEDURE — 25000128 H RX IP 250 OP 636: Performed by: FAMILY MEDICINE

## 2018-06-28 PROCEDURE — 99215 OFFICE O/P EST HI 40 MIN: CPT | Mod: 25 | Performed by: FAMILY MEDICINE

## 2018-06-28 PROCEDURE — 20610 DRAIN/INJ JOINT/BURSA W/O US: CPT | Mod: 50 | Performed by: FAMILY MEDICINE

## 2018-06-28 RX ORDER — CETIRIZINE HYDROCHLORIDE 10 MG/1
10 TABLET ORAL DAILY
Qty: 90 TABLET | Refills: 3 | Status: SHIPPED | OUTPATIENT
Start: 2018-06-28 | End: 2020-05-15

## 2018-06-28 RX ORDER — LISINOPRIL AND HYDROCHLOROTHIAZIDE 12.5; 2 MG/1; MG/1
2 TABLET ORAL DAILY
Qty: 180 TABLET | Refills: 3 | Status: SHIPPED | OUTPATIENT
Start: 2018-06-28 | End: 2019-06-03

## 2018-06-28 RX ORDER — ACETAMINOPHEN 160 MG
1 TABLET,DISINTEGRATING ORAL DAILY
Qty: 30 CAPSULE | Refills: 11 | Status: SHIPPED | OUTPATIENT
Start: 2018-06-28

## 2018-06-28 RX ORDER — METRONIDAZOLE 10 MG/G
GEL TOPICAL DAILY
Qty: 60 G | Refills: 3 | Status: SHIPPED | OUTPATIENT
Start: 2018-06-28 | End: 2020-05-15

## 2018-06-28 RX ORDER — LEVOTHYROXINE SODIUM 100 UG/1
100 TABLET ORAL DAILY
Qty: 90 TABLET | Refills: 3 | Status: SHIPPED | OUTPATIENT
Start: 2018-06-28 | End: 2018-10-10

## 2018-06-28 RX ORDER — TRAZODONE HYDROCHLORIDE 50 MG/1
50 TABLET, FILM COATED ORAL
Qty: 90 TABLET | Refills: 3 | Status: CANCELLED | OUTPATIENT
Start: 2018-06-28

## 2018-06-28 RX ORDER — METHYLPREDNISOLONE ACETATE 80 MG/ML
80 INJECTION, SUSPENSION INTRA-ARTICULAR; INTRALESIONAL; INTRAMUSCULAR; SOFT TISSUE ONCE
Status: COMPLETED | OUTPATIENT
Start: 2018-06-28 | End: 2018-06-28

## 2018-06-28 RX ORDER — EPINEPHRINE 0.3 MG/.3ML
0.3 INJECTION SUBCUTANEOUS PRN
Qty: 0.6 ML | Refills: 0 | Status: SHIPPED | OUTPATIENT
Start: 2018-06-28 | End: 2020-05-15

## 2018-06-28 RX ORDER — CYANOCOBALAMIN 1000 UG/ML
1 INJECTION, SOLUTION INTRAMUSCULAR; SUBCUTANEOUS
Qty: 3 ML | Refills: 3 | Status: SHIPPED | OUTPATIENT
Start: 2018-06-28 | End: 2019-07-30

## 2018-06-28 RX ORDER — TRAZODONE HYDROCHLORIDE 100 MG/1
100 TABLET ORAL AT BEDTIME
Qty: 90 TABLET | Refills: 3 | Status: SHIPPED | OUTPATIENT
Start: 2018-06-28 | End: 2019-01-10

## 2018-06-28 RX ORDER — DIPHENOXYLATE HYDROCHLORIDE AND ATROPINE SULFATE 2.5; .025 MG/1; MG/1
1 TABLET ORAL
COMMUNITY
Start: 2018-06-28

## 2018-06-28 RX ORDER — CLONAZEPAM 1 MG/1
1 TABLET ORAL AT BEDTIME
Qty: 90 TABLET | Refills: 1 | Status: SHIPPED | OUTPATIENT
Start: 2018-06-28 | End: 2018-09-20

## 2018-06-28 RX ORDER — TRAMADOL HYDROCHLORIDE 50 MG/1
50 TABLET ORAL EVERY 6 HOURS PRN
Qty: 90 TABLET | Refills: 5 | Status: SHIPPED | OUTPATIENT
Start: 2018-06-28 | End: 2019-01-10

## 2018-06-28 RX ORDER — BUSPIRONE HYDROCHLORIDE 10 MG/1
10 TABLET ORAL 3 TIMES DAILY
Qty: 90 TABLET | Refills: 11 | Status: SHIPPED | OUTPATIENT
Start: 2018-06-28 | End: 2019-01-22

## 2018-06-28 RX ORDER — LEVOTHYROXINE SODIUM 88 UG/1
88 TABLET ORAL DAILY
Qty: 90 TABLET | Refills: 3 | Status: SHIPPED | OUTPATIENT
Start: 2018-06-28 | End: 2018-06-28

## 2018-06-28 RX ADMIN — METHYLPREDNISOLONE ACETATE 80 MG: 80 INJECTION, SUSPENSION INTRA-ARTICULAR; INTRALESIONAL; INTRAMUSCULAR; SOFT TISSUE at 16:03

## 2018-06-28 RX ADMIN — METHYLPREDNISOLONE ACETATE 80 MG: 80 INJECTION, SUSPENSION INTRA-ARTICULAR; INTRALESIONAL; INTRAMUSCULAR; SOFT TISSUE at 16:04

## 2018-06-28 ASSESSMENT — ENCOUNTER SYMPTOMS
HEADACHES: 1
VOICE CHANGE: 0
SHORTNESS OF BREATH: 0
POLYPHAGIA: 0
FATIGUE: 1
ABDOMINAL DISTENTION: 1
ARTHRALGIAS: 1
UNEXPECTED WEIGHT CHANGE: 0
TROUBLE SWALLOWING: 0
SLEEP DISTURBANCE: 1
FREQUENCY: 0
PHOTOPHOBIA: 0
ABDOMINAL PAIN: 0
POLYDIPSIA: 0

## 2018-06-28 ASSESSMENT — ANXIETY QUESTIONNAIRES
6. BECOMING EASILY ANNOYED OR IRRITABLE: MORE THAN HALF THE DAYS
1. FEELING NERVOUS, ANXIOUS, OR ON EDGE: NEARLY EVERY DAY
3. WORRYING TOO MUCH ABOUT DIFFERENT THINGS: MORE THAN HALF THE DAYS
7. FEELING AFRAID AS IF SOMETHING AWFUL MIGHT HAPPEN: SEVERAL DAYS
GAD7 TOTAL SCORE: 13
5. BEING SO RESTLESS THAT IT IS HARD TO SIT STILL: SEVERAL DAYS
2. NOT BEING ABLE TO STOP OR CONTROL WORRYING: MORE THAN HALF THE DAYS
IF YOU CHECKED OFF ANY PROBLEMS ON THIS QUESTIONNAIRE, HOW DIFFICULT HAVE THESE PROBLEMS MADE IT FOR YOU TO DO YOUR WORK, TAKE CARE OF THINGS AT HOME, OR GET ALONG WITH OTHER PEOPLE: VERY DIFFICULT

## 2018-06-28 ASSESSMENT — PAIN SCALES - GENERAL: PAINLEVEL: MILD PAIN (2)

## 2018-06-28 ASSESSMENT — PATIENT HEALTH QUESTIONNAIRE - PHQ9: 5. POOR APPETITE OR OVEREATING: MORE THAN HALF THE DAYS

## 2018-06-28 NOTE — NURSING NOTE
Coming in for medication check, thyroid, injections in both knees  Yadira Warner LPN on 6/28/2018 at 3:07 PM

## 2018-06-28 NOTE — MR AVS SNAPSHOT
After Visit Summary   6/28/2018    Laverne Nguyen    MRN: [id number]           Patient Information     Date Of Birth          1980        Visit Information        Provider Department      6/28/2018 3:00 PM Alin Pimentel MD [facility]        Today's Diagnoses     Primary osteoarthritis of both knees    -  1    Lumbar sprain, initial encounter        Primary insomnia        Environmental allergies        Generalized anxiety disorder        B12 deficiency        Essential hypertension        Other specified hypothyroidism        Rosacea           Follow-ups after your visit        Who to contact     If you have questions or need follow up information about today's clinic visit or your schedule please contact [facility] directly at [phone].  Normal or non-critical lab and imaging results will be communicated to you by Loginzahart, letter or phone within 4 business days after the clinic has received the results. If you do not hear from us within 7 days, please contact the clinic through Loginzahart or phone. If you have a critical or abnormal lab result, we will notify you by phone as soon as possible.  Submit refill requests through [email or web address] or call your pharmacy and they will forward the refill request to us. Please allow 3 business days for your refill to be completed.          Additional Information About Your Visit        MyChart Information     [email or web address] gives you secure access to your electronic health record. If you see a primary care provider, you can also send messages to your care team and make appointments. If you have questions, please call your primary care clinic.  If you do not have a primary care provider, please call [phone] and they will assist you.        Care EveryWhere ID     This is your Care EveryWhere ID. This could be used by other organizations to access your [place] medical records  [id number]        Your Vitals Were      Respirations BMI (Body Mass Index)                16 45.58 kg/m2           Blood Pressure from Last 3 Encounters:   06/28/18 142/76   03/08/18 124/70   01/05/18 126/66    Weight from Last 3 Encounters:   06/28/18 276 lb (125.2 kg)   03/08/18 277 lb 12.8 oz (126 kg)   01/05/18 270 lb 12.8 oz (122.8 kg)              We Performed the Following     DRAIN/INJECT LARGE JOINT/BURSA [20610]          Today's Medication Changes          These changes are accurate as of 6/28/18  3:48 PM.  If you have any questions, ask your nurse or doctor.               These medicines have changed or have updated prescriptions.        Dose/Directions    busPIRone 10 MG tablet   Commonly known as:  BUSPAR   This may have changed:  See the new instructions.   Used for:  Generalized anxiety disorder   Changed by:  Alin Pimentel MD        Dose:  10 mg   Take 1 tablet (10 mg) by mouth 3 times daily   Quantity:  90 tablet   Refills:  11       cetirizine 10 MG tablet   Commonly known as:  zyrTEC   This may have changed:  See the new instructions.   Used for:  Environmental allergies   Changed by:  Alin Pimentel MD        Dose:  10 mg   Take 1 tablet (10 mg) by mouth daily   Quantity:  90 tablet   Refills:  3       clonazePAM 1 MG tablet   Commonly known as:  klonoPIN   This may have changed:  See the new instructions.   Used for:  Generalized anxiety disorder   Changed by:  Alin Pimentel MD        Dose:  1 mg   Take 1 tablet (1 mg) by mouth At Bedtime   Quantity:  90 tablet   Refills:  1       cyanocobalamin 1000 MCG/ML injection   Commonly known as:  VITAMIN B12   This may have changed:  See the new instructions.   Used for:  B12 deficiency   Changed by:  Alin Pimentel MD        Dose:  1 mL   Inject 1 mL (1,000 mcg) into the muscle every 30 days   Quantity:  3 mL   Refills:  3       EPINEPHrine 0.3 MG/0.3ML injection 2-pack   Commonly known as:  EPIPEN/ADRENACLICK/or ANY BX GENERIC EQUIV   This may have changed:    - when to take this  -  "reasons to take this   Used for:  Environmental allergies   Changed by:  Alin Pimentel MD        Dose:  0.3 mg   Inject 0.3 mLs (0.3 mg) into the muscle as needed for anaphylaxis   Quantity:  0.6 mL   Refills:  0       levothyroxine 100 MCG tablet   Commonly known as:  SYNTHROID/LEVOTHROID   This may have changed:    - medication strength  - how much to take  - when to take this   Used for:  Other specified hypothyroidism   Changed by:  Alin Pimentel MD        Dose:  100 mcg   Take 1 tablet (100 mcg) by mouth daily   Quantity:  90 tablet   Refills:  3       lisinopril-hydrochlorothiazide 20-12.5 MG per tablet   Commonly known as:  PRINZIDE/ZESTORETIC   This may have changed:  See the new instructions.   Used for:  Essential hypertension   Changed by:  Alin Pimentel MD        Dose:  2 tablet   Take 2 tablets by mouth daily   Quantity:  180 tablet   Refills:  3       metroNIDAZOLE 1 % gel   Commonly known as:  METROGEL   This may have changed:  when to take this   Used for:  Rosacea   Changed by:  Alin Pimentel MD        Apply topically daily   Quantity:  60 g   Refills:  3       syringe/needle (disp) 25G X 1\" 3 ML Misc   Commonly known as:  MEDSAVER SYRINGE   This may have changed:  See the new instructions.   Used for:  B12 deficiency   Changed by:  Alin Pimentel MD        Dose:  1 each   Inject 1 each into the muscle every 30 days   Quantity:  12 each   Refills:  0       * traZODone 50 MG tablet   Commonly known as:  DESYREL   This may have changed:  Another medication with the same name was changed. Make sure you understand how and when to take each.   Used for:  Primary insomnia   Changed by:  Alin Pimentel MD        Dose:  50 mg   Take 1 tablet (50 mg) by mouth nightly as needed for sleep   Quantity:  90 tablet   Refills:  3       * traZODone 100 MG tablet   Commonly known as:  DESYREL   This may have changed:  See the new instructions.   Used for:  Primary insomnia   Changed by:  Alin Pimentel MD        " "Dose:  100 mg   Take 1 tablet (100 mg) by mouth At Bedtime   Quantity:  90 tablet   Refills:  3       vitamin D3 2000 units Caps   This may have changed:  See the new instructions.   Used for:  Primary osteoarthritis of both knees   Changed by:  Alin Pimentel MD        Dose:  1 tablet   Take 1 tablet by mouth daily   Quantity:  30 capsule   Refills:  11       * Notice:  This list has 2 medication(s) that are the same as other medications prescribed for you. Read the directions carefully, and ask your doctor or other care provider to review them with you.         Where to get your medicines      These medications were sent to [facility] - [place], [place] - [address] AT SEC of [facility] [address]  [address], [facility] [address]     Phone:  [phone]     busPIRone 10 MG tablet    cetirizine 10 MG tablet    cyanocobalamin 1000 MCG/ML injection    EPINEPHrine 0.3 MG/0.3ML injection 2-pack    levothyroxine 100 MCG tablet    lisinopril-hydrochlorothiazide 20-12.5 MG per tablet    metroNIDAZOLE 1 % gel    syringe/needle (disp) 25G X 1\" 3 ML Misc    traZODone 100 MG tablet    vitamin D3 2000 units Caps         Some of these will need a paper prescription and others can be bought over the counter.  Ask your nurse if you have questions.     Bring a paper prescription for each of these medications     clonazePAM 1 MG tablet    traMADol 50 MG tablet               Information about OPIOIDS     PRESCRIPTION OPIOIDS: WHAT YOU NEED TO KNOW   We gave you an opioid (narcotic) pain medicine. It is important to manage your pain, but opioids are not always the best choice. You should first try all the other options your care team gave you. Take this medicine for as short a time (and as few doses) as possible.     These medicines have risks:    DO NOT drive when on new or higher doses of pain medicine. These medicines can affect your alertness and reaction times, and you could be arrested for driving under " the influence (DUI). If you need to use opioids long-term, talk to your care team about driving.    DO NOT operate heave machinery    DO NOT do any other dangerous activities while taking these medicines.     DO NOT drink any alcohol while taking these medicines.      If the opioid prescribed includes acetaminophen, DO NOT take with any other medicines that contain acetaminophen. Read all labels carefully. Look for the word  acetaminophen  or  Tylenol.  Ask your pharmacist if you have questions or are unsure.    You can get addicted to pain medicines, especially if you have a history of addiction (chemical, alcohol or substance dependence). Talk to your care team about ways to reduce this risk.    Store your pills in a secure place, locked if possible. We will not replace any lost or stolen medicine. If you don t finish your medicine, please throw away (dispose) as directed by your pharmacist. The [place] Pollution [employer] Agency has more information about safe disposal: https://www.pca.[facility].us/living-green/managing-unwanted-medications.     All opioids tend to cause constipation. Drink plenty of water and eat foods that have a lot of fiber, such as fruits, vegetables, prune juice, apple juice and high-fiber cereal. Take a laxative (Miralax, milk of magnesia, Colace, Senna) if you don t move your bowels at least every other day.          Primary Care Provider Office Phone # Fax #    Alin Pimentel [phone]       [address] [facility] [address]        Equal Access to Services     NATALIA BELLO : Hadii aad ku hadasho Sonicholas, waaxda luqadaha, qaybta kaalmada adereneeyada, jose saha. So [facility] [phone].    ATENCIÓN: Si habla español, tiene a bronson disposición servicios gratuitos de asistencia lingüística. Llame al [phone].    We comply with applicable federal civil rights laws and [place] laws. We do not discriminate on the basis of race, color,  national origin, age, disability, sex, sexual orientation, or gender identity.            Thank you!     Thank you for choosing [facility]  for your care. Our goal is always to provide you with excellent care. Hearing back from our patients is one way we can continue to improve our services. Please take a few minutes to complete the written survey that you may receive in the mail after your visit with us. Thank you!             Your Updated Medication List - Protect others around you: Learn how to safely use, store and throw away your medicines at www.disposemymeds.org.          This list is accurate as of 6/28/18  3:48 PM.  Always use your most recent med list.                   Brand Name Dispense Instructions for use Diagnosis    busPIRone 10 MG tablet    BUSPAR    90 tablet    Take 1 tablet (10 mg) by mouth 3 times daily    Generalized anxiety disorder       cetirizine 10 MG tablet    zyrTEC    90 tablet    Take 1 tablet (10 mg) by mouth daily    Environmental allergies       clonazePAM 1 MG tablet    klonoPIN    90 tablet    Take 1 tablet (1 mg) by mouth At Bedtime    Generalized anxiety disorder       cyanocobalamin 1000 MCG/ML injection    VITAMIN B12    3 mL    Inject 1 mL (1,000 mcg) into the muscle every 30 days    B12 deficiency       EPINEPHrine 0.3 MG/0.3ML injection 2-pack    EPIPEN/ADRENACLICK/or ANY BX GENERIC EQUIV    0.6 mL    Inject 0.3 mLs (0.3 mg) into the muscle as needed for anaphylaxis    Environmental allergies       levothyroxine 100 MCG tablet    SYNTHROID/LEVOTHROID    90 tablet    Take 1 tablet (100 mcg) by mouth daily    Other specified hypothyroidism       lisinopril-hydrochlorothiazide 20-12.5 MG per tablet    PRINZIDE/ZESTORETIC    180 tablet    Take 2 tablets by mouth daily    Essential hypertension       metroNIDAZOLE 1 % gel    METROGEL    60 g    Apply topically daily    Rosacea       MULTI-VITAMINS Tabs      Take 1 tablet by mouth    Other specified  "hypothyroidism       nicotine polacrilex 2 MG lozenge    COMMIT     TK 1 LOZENGE PO PRF TOBACCO CESSATION        syringe/needle (disp) 25G X 1\" 3 ML Misc    MEDSAVER SYRINGE    12 each    Inject 1 each into the muscle every 30 days    B12 deficiency       traMADol 50 MG tablet    ULTRAM    90 tablet    Take 1 tablet (50 mg) by mouth every 6 hours as needed for severe pain    Lumbar sprain, initial encounter       * traZODone 50 MG tablet    DESYREL    90 tablet    Take 1 tablet (50 mg) by mouth nightly as needed for sleep    Primary insomnia       * traZODone 100 MG tablet    DESYREL    90 tablet    Take 1 tablet (100 mg) by mouth At Bedtime    Primary insomnia       vitamin D3 2000 units Caps     30 capsule    Take 1 tablet by mouth daily    Primary osteoarthritis of both knees       * Notice:  This list has 2 medication(s) that are the same as other medications prescribed for you. Read the directions carefully, and ask your doctor or other care provider to review them with you.      "

## 2018-06-28 NOTE — PROGRESS NOTES
"  SUBJECTIVE:   Laverne Nguyen is a 38 year old female who presents to clinic today for the following health issues:    HPI  Many issues.  Reports chest pain, hypertension and severe anxiety.  Feels \"worked up\" and unable to calm herself down.  Is due for refills and wants repeat knee injections.  Worried her thyroid is not effectively being treated.  TSH was 4.87 in Jan.  She has reseached this and says she has basically all of the symptoms of thyroid disease.  Blood pressure was as high as 167/152 on her home machine (this is not a typo), she repeated and was 160/115.  At work was 149/108 recently.  Notes tachycardia as well. Has not missed any of the doses of the synthroid.  Increased her zestoretic to 1.5 tabs daily a week ago, and is now getting some lower readings.  Is gaining weight, feeling very fatigued and tired.  Mom has anxiety and hypothyroid.  Using the Klonopin rarely.  Most recently after a trip in [facility].      Using ultram for her knees, 2 in the AM and 1 at hs.  Pain worse when she works.  Injections help her pain 2 months or so.    Patient Active Problem List    Diagnosis Date Noted     Tobacco abuse 03/07/2018     Priority: Medium     Morbid obesity (H) 03/07/2018     Priority: Medium     Overview:   2007, BMI 51       Hypertension 03/07/2018     Priority: Medium     Primary insomnia 02/28/2018     Priority: Medium     Lumbar facet joint syndrome 01/05/2018     Priority: Medium     Generalized anxiety disorder 10/05/2015     Priority: Medium     Chondromalacia of both patellae 09/15/2015     Priority: Medium     Major depressive disorder, recurrent, mild (H) 01/02/2015     Priority: Medium     Pain medication agreement 11/20/2014     Priority: Medium     Overview:   Updated 5/6/2016       Degenerative arthritis of knee 11/10/2014     Priority: Medium     Rectal bleeding 01/09/2014     Priority: Medium     Otosclerosis 05/16/2013     Priority: Medium     Idiopathic angioedema 11/29/2012     " Priority: Medium     Other specified hypothyroidism 10/12/2010     Priority: Medium     B12 deficiency 09/15/2010     Priority: Medium     Overview:   secondary to gastric bypass       Past Surgical History:   Procedure Laterality Date     ADENOIDECTOMY           ANKLE SURGERY           COLONOSCOPY      1/10/2014,hyperplastic     LAPAROSCOPIC TUBAL LIGATION           OTHER SURGICAL HISTORY      10/86,36090.0,NH CREATE EARDRUM OPENING GEN ANESTH     OTHER SURGICAL HISTORY      ,92961.0,NH CREATE EARDRUM OPENING GEN ANESTH     OTHER SURGICAL HISTORY      ,341717,OTHER, with left PE tube     OTHER SURGICAL HISTORY      ,510165,OTHER     OTHER SURGICAL HISTORY      07,499900,OTHER, for obesity, [place]     Social History   Substance Use Topics     Smoking status: Former Smoker     Packs/day: 0.25     Types: Cigarettes     Smokeless tobacco: Never Used      Comment: Quit smokin CIGARETTES A WEEK     Alcohol use Yes      Comment: Alcoholic Drinks/day: occasionally 2/month     Current Outpatient Prescriptions   Medication Sig Dispense Refill     busPIRone (BUSPAR) 10 MG tablet Take 1 tablet (10 mg) by mouth 3 times daily 90 tablet 11     cetirizine (ZYRTEC) 10 MG tablet Take 1 tablet (10 mg) by mouth daily 90 tablet 3     Cholecalciferol (VITAMIN D3) 2000 units CAPS Take 1 tablet by mouth daily 30 capsule 11     clonazePAM (KLONOPIN) 1 MG tablet Take 1 tablet (1 mg) by mouth At Bedtime 90 tablet 1     cyanocobalamin (VITAMIN B12) 1000 MCG/ML injection Inject 1 mL (1,000 mcg) into the muscle every 30 days 3 mL 3     EPINEPHrine (EPIPEN/ADRENACLICK/OR ANY BX GENERIC EQUIV) 0.3 MG/0.3ML injection 2-pack Inject 0.3 mLs (0.3 mg) into the muscle as needed for anaphylaxis 0.6 mL 0     levothyroxine (SYNTHROID/LEVOTHROID) 88 MCG tablet Take 1 tablet (88 mcg) by mouth daily 90 tablet 3     lisinopril-hydrochlorothiazide (PRINZIDE/ZESTORETIC) 20-12.5 MG per tablet Take 2 tablets by  "mouth daily 180 tablet 3     metroNIDAZOLE (METROGEL) 1 % gel Apply topically daily 60 g 3     Multiple Vitamin (MULTI-VITAMINS) TABS Take 1 tablet by mouth       nicotine polacrilex (COMMIT) 2 MG lozenge TK 1 LOZENGE PO PRF TOBACCO CESSATION       syringe/needle, disp, (MEDSAVER SYRINGE) 25G X 1\" 3 ML MISC Inject 1 each into the muscle every 30 days 12 each 0     traMADol (ULTRAM) 50 MG tablet Take 1 tablet (50 mg) by mouth every 6 hours as needed for severe pain 90 tablet 5     traZODone (DESYREL) 100 MG tablet Take 1 tablet (100 mg) by mouth At Bedtime 90 tablet 3     traZODone (DESYREL) 50 MG tablet Take 1 tablet (50 mg) by mouth nightly as needed for sleep 90 tablet 3     [DISCONTINUED] clonazePAM (KLONOPIN) 1 MG tablet TAKE 1 TABLET BY MOUTH AT BEDTIME 90 tablet 0     [DISCONTINUED] levothyroxine (SYNTHROID/LEVOTHROID) 88 MCG tablet Take 88 mcg by mouth       [DISCONTINUED] lisinopril-hydrochlorothiazide (PRINZIDE/ZESTORETIC) 20-12.5 MG per tablet TAKE 1 TABLET BY MOUTH EVERY DAY 90 tablet 3     [DISCONTINUED] traZODone (DESYREL) 100 MG tablet TAKE 1 TABLET BY MOUTH AT BEDTIME (Patient not taking: Reported on 6/28/2018) 90 tablet 3     Allergies   Allergen Reactions     Amoxicillin Other (See Comments)     Pt reports it is ineffective for her personally       Review of Systems   Constitutional: Positive for fatigue. Negative for unexpected weight change.   HENT: Negative for congestion, trouble swallowing and voice change.    Eyes: Negative for photophobia.   Respiratory: Negative for shortness of breath.    Cardiovascular: Negative for chest pain.   Gastrointestinal: Positive for abdominal distention. Negative for abdominal pain.   Endocrine: Negative for polydipsia and polyphagia.   Genitourinary: Negative for frequency.   Musculoskeletal: Positive for arthralgias.   Skin: Negative for rash.   Allergic/Immunologic: Negative for immunocompromised state.   Neurological: Positive for headaches. "   Psychiatric/Behavioral: Positive for mood changes and sleep disturbance.        OBJECTIVE:     /76 (BP Location: Right arm, Patient Position: Sitting, Cuff Size: Adult Large)  Resp 16  Wt 276 lb (125.2 kg)  BMI 45.58 kg/m2  Body mass index is 45.58 kg/(m^2).  Physical Exam   Constitutional: She is oriented to person, place, and time. She appears well-developed. No distress.   HENT:   Head: Normocephalic and atraumatic.   Right Ear: External ear normal.   Left Ear: External ear normal.   Eyes: Conjunctivae are normal. Pupils are equal, round, and reactive to light.   Neck: Normal range of motion.   Cardiovascular: Normal rate, regular rhythm and normal heart sounds.  Exam reveals no gallop and no friction rub.    No murmur heard.  Pulmonary/Chest: Effort normal. No respiratory distress. She has no wheezes. She has no rales.   Abdominal: Soft. She exhibits no distension. There is no tenderness. There is no rebound and no guarding.   Musculoskeletal: She exhibits no tenderness.   Neurological: She is alert and oriented to person, place, and time.   Skin: Skin is warm. No rash noted. She is not diaphoretic. No erythema.   Psychiatric: She has a normal mood and affect. Her behavior is normal. Thought content normal.       Diagnostic Test Results:  none     ASSESSMENT/PLAN:         (M17.0) Primary osteoarthritis of both knees  (primary encounter diagnosis)  Comment: no significant change  Plan: Cholecalciferol (VITAMIN D3) 2000 units CAPS        She wanted to repeat the injections, so consent signed.  Both knees prepped with chloraprep and infiltrated with 4 ml 1% lidocaine and 80 milligram depot medrol in each knee.  superiorlateral approach without complications.     (S33.5XXA) Lumbar sprain, initial encounter  Comment: stable  Plan: traMADol (ULTRAM) 50 MG tablet        Refilled this for her, is used more now for her knee djd    (F51.01) Primary insomnia  Comment: stable  Plan: traZODone (DESYREL) 100 MG  "tablet        refilled    (Z91.09) Environmental allergies  Comment: stable  Plan: cetirizine (ZYRTEC) 10 MG tablet, EPINEPHrine         (EPIPEN/ADRENACLICK/OR ANY BX GENERIC EQUIV)         0.3 MG/0.3ML injection 2-pack        Refilled this    (F41.1) Generalized anxiety disorder  Comment: worse  Plan: clonazePAM (KLONOPIN) 1 MG tablet, busPIRone         (BUSPAR) 10 MG tablet        Advised adding on counseling    (E53.8) B12 deficiency  Comment: stable  Plan: cyanocobalamin (VITAMIN B12) 1000 MCG/ML         injection, syringe/needle, disp, (MEDSAVER         SYRINGE) 25G X 1\" 3 ML MISC        rerfilled    (I10) Essential hypertension  Comment: worse  Plan: lisinopril-hydrochlorothiazide         (PRINZIDE/ZESTORETIC) 20-12.5 MG per tablet        I increased this to 2 tabs daily     (E03.8) Other specified hypothyroidism  Comment: perhaps worse  Plan: Multiple Vitamin (MULTI-VITAMINS) TABS,         levothyroxine (SYNTHROID/LEVOTHROID) 100 MCG         tablet, DISCONTINUED: levothyroxine         (SYNTHROID/LEVOTHROID) 88 MCG tablet        She wants to try to get the TSH back int he 1 range.  This might not be reasonable, but I will increase the dose to 100 microgram and have her repeat labs in 8 weeks    (L71.9) Rosacea  Comment: stable  Plan: metroNIDAZOLE (METROGEL) 1 % gel        Refilled         Alin Pimentel MD  [facility]    "

## 2018-06-29 ASSESSMENT — ANXIETY QUESTIONNAIRES: GAD7 TOTAL SCORE: 13

## 2018-07-23 NOTE — PROGRESS NOTES
Patient Information     Patient Name  Laverne Nguyen MRN  [id number] Sex  Female   1980      Letter by Alin Pimentel MD at      Author:  Alin Pimentel MD Service:  (none) Author Type:  (none)    Filed:   Encounter Date:  2017 Status:  (Other)           Laverne Nguyen  [address]  [facility] [address]          July 3, 2017    Dear Ms. Nguyen:    This letter is to remind you that you are due for your follow up exam with Alin Pimentel MD.     A LIMITED refill of escitalopram oxalate (LEXAPRO) 20 mg tablet has been called into your pharmacy. Additional refills require you to complete this appointment.    Please call the clinic at [phone] to schedule your appointment.    If you should require additional refills before your scheduled appointment, please contact your pharmacy and we will refill your medication until the date of your appointment.    If you are no longer seeing Alin Pimentel MD for primary care, please call to let us know. Doing so will remove you from our call/contact list.    Thank you for choosing [facility] and [facility] for your health care needs.    Sincerely,    Refill RN  [facility]

## 2018-07-24 NOTE — PROGRESS NOTES
Patient Information     Patient Name  Laverne Nguyen MRN  [id number] Sex  Female   1980      Letter by Shanti Moses PA-C at      Author:  Shanti Moses PA-C Service:  (none) Author Type:  (none)    Filed:   Encounter Date:  2017 Status:  (Other)             Work/School Excuse and Restrictions       Arrived:               Discharged:                                                              10:07 AM  2017        Laverne Nguyen  was seen today in the [facility].    She is not feeling well and should miss work tomorrow unless she feels better.            *As an acute care facility, we do not provide follow-up care and are therefore unable to complete paperwork for injuries requiring more than 72 hours away from work. Patients need to follow up with a primary care or occupational health provider.    **If you have questions regarding the validity of this note, please call the number above.      Shanti Moses PA-C ....................  2017   10:08 AM

## 2018-08-06 DIAGNOSIS — S33.5XXA LUMBAR SPRAIN, INITIAL ENCOUNTER: ICD-10-CM

## 2018-08-09 RX ORDER — TRAMADOL HYDROCHLORIDE 50 MG/1
TABLET ORAL
Qty: 90 TABLET | Refills: 0 | OUTPATIENT
Start: 2018-08-09

## 2018-08-09 NOTE — TELEPHONE ENCOUNTER
Redundant Refill Request for Tramadol refused;    Medication Detail      Disp Refills Start End MELISSA   traMADol (ULTRAM) 50 MG tablet 90 tablet 5 6/28/2018  No   Sig - Route: Take 1 tablet (50 mg) by mouth every 6 hours as needed for severe pain - Oral   Class: Local Print   Order: [id number]     Unable to complete prescription refill per RN Medication Refill Policy. Jose Escobar 8/9/2018 10:49 AM

## 2018-08-13 DIAGNOSIS — F33.0 MAJOR DEPRESSIVE DISORDER, RECURRENT, MILD (H): Primary | ICD-10-CM

## 2018-08-15 RX ORDER — ESCITALOPRAM OXALATE 20 MG/1
TABLET ORAL
Qty: 90 TABLET | Refills: 3 | Status: SHIPPED | OUTPATIENT
Start: 2018-08-15 | End: 2019-01-22

## 2018-10-03 ENCOUNTER — ALLIED HEALTH/NURSE VISIT (OUTPATIENT)
Dept: FAMILY MEDICINE | Facility: OTHER | Age: 38
End: 2018-10-03
Attending: FAMILY MEDICINE
Payer: COMMERCIAL

## 2018-10-03 DIAGNOSIS — Z23 NEED FOR PROPHYLACTIC VACCINATION AND INOCULATION AGAINST INFLUENZA: Primary | ICD-10-CM

## 2018-10-03 PROCEDURE — 90686 IIV4 VACC NO PRSV 0.5 ML IM: CPT

## 2018-10-03 PROCEDURE — 90471 IMMUNIZATION ADMIN: CPT

## 2018-10-03 NOTE — MR AVS SNAPSHOT
After Visit Summary   10/3/2018    Laverne Nguyen    MRN: [id number]           Patient Information     Date Of Birth          1980        Visit Information        Provider Department      10/3/2018 2:45 PM  [facility]        Today's Diagnoses     Need for prophylactic vaccination and inoculation against influenza    -  1       Follow-ups after your visit        Your next 10 appointments already scheduled     Oct 03, 2018  2:45 PM CDT   Nurse Only with [facility] ([facility])    [address]  [place] [id number] [phone]            Oct 09, 2018  1:15 PM CDT   Office Visit with Alin Pimentel MD   [facility]    [address]  [place] [id number] [phone]           Bring a current list of meds and any records pertaining to this visit. For Physicals, please bring immunization records and any forms needing to be filled out. Please arrive 10 minutes early to complete paperwork.              Who to contact     If you have questions or need follow up information about today's clinic visit or your schedule please contact [facility] directly at [phone].  Normal or non-critical lab and imaging results will be communicated to you by [employer]hart, letter or phone within 4 business days after the clinic has received the results. If you do not hear from us within 7 days, please contact the clinic through [employer] or phone. If you have a critical or abnormal lab result, we will notify you by phone as soon as possible.  Submit refill requests through [employer] or call your pharmacy and they will forward the refill request to us. Please allow 3 business days for your refill to be completed.          Additional Information About Your Visit        [employer] Information     [employer] gives you secure access to your electronic  health record. If you see a primary care provider, you can also send messages to your care team and make appointments. If you have questions, please call your primary care clinic.  If you do not have a primary care provider, please call [phone] and they will assist you.        Care EveryWhere ID     This is your Care EveryWhere ID. This could be used by other organizations to access your [place] medical records  [id number]         Blood Pressure from Last 3 Encounters:   06/28/18 142/76   03/08/18 124/70   01/05/18 126/66    Weight from Last 3 Encounters:   06/28/18 276 lb (125.2 kg)   03/08/18 277 lb 12.8 oz (126 kg)   01/05/18 270 lb 12.8 oz (122.8 kg)              We Performed the Following     HC FLU VAC PRESRV FREE QUAD SPLIT VIR 3+YRS IM        Primary Care Provider Office Phone # Fax #    Alin Pimentel [phone]       [address] [facility] [address]        Equal Access to Services     [facility] : Hadii aad ku hadasho Soomaali, waaxda luqadaha, qaybta kaalmada adeegyajon, jose will . So [facility] [phone].    ATENCIÓN: Si habla español, tiene a bronson disposición servicios gratuitos de asistencia lingüística. Llame al [phone].    We comply with applicable federal civil rights laws and [place] laws. We do not discriminate on the basis of race, color, national origin, age, disability, sex, sexual orientation, or gender identity.            Thank you!     Thank you for choosing [facility] AND \[facility]\""  for your care. Our goal is always to provide you with excellent care. Hearing back from our patients is one way we can continue to improve our services. Please take a few minutes to complete the written survey that you may receive in the mail after your visit with us. Thank you!             Your Updated Medication List - Protect others around you: Learn how to safely use, store and throw away your medicines at www.disposemymeds.org.  "         This list is accurate as of 10/3/18  2:44 PM.  Always use your most recent med list.                   Brand Name Dispense Instructions for use Diagnosis    busPIRone 10 MG tablet    BUSPAR    90 tablet    Take 1 tablet (10 mg) by mouth 3 times daily    Generalized anxiety disorder       cetirizine 10 MG tablet    zyrTEC    90 tablet    Take 1 tablet (10 mg) by mouth daily    Environmental allergies       clonazePAM 1 MG tablet    klonoPIN    30 tablet    TAKE 1 TABLET BY MOUTH AT BEDTIME    Generalized anxiety disorder       cyanocobalamin 1000 MCG/ML injection    VITAMIN B12    3 mL    Inject 1 mL (1,000 mcg) into the muscle every 30 days    B12 deficiency       EPINEPHrine 0.3 MG/0.3ML injection 2-pack    EPIPEN/ADRENACLICK/or ANY BX GENERIC EQUIV    0.6 mL    Inject 0.3 mLs (0.3 mg) into the muscle as needed for anaphylaxis    Environmental allergies       escitalopram 20 MG tablet    LEXAPRO    90 tablet    TAKE 1 TABLET BY MOUTH EVERY DAY    Major depressive disorder, recurrent, mild (H)       levothyroxine 100 MCG tablet    SYNTHROID/LEVOTHROID    90 tablet    Take 1 tablet (100 mcg) by mouth daily    Other specified hypothyroidism       lisinopril-hydrochlorothiazide 20-12.5 MG per tablet    PRINZIDE/ZESTORETIC    180 tablet    Take 2 tablets by mouth daily    Essential hypertension       metroNIDAZOLE 1 % gel    METROGEL    60 g    Apply topically daily    Rosacea       MULTI-VITAMINS Tabs      Take 1 tablet by mouth    Other specified hypothyroidism       nicotine polacrilex 2 MG lozenge    COMMIT     TK 1 LOZENGE PO PRF TOBACCO CESSATION        syringe/needle (disp) 25G X 1\" 3 ML Misc    MEDSAVER SYRINGE    12 each    Inject 1 each into the muscle every 30 days    B12 deficiency       traMADol 50 MG tablet    ULTRAM    90 tablet    Take 1 tablet (50 mg) by mouth every 6 hours as needed for severe pain    Lumbar sprain, initial encounter       * traZODone 50 MG tablet    DESYREL    90 tablet    " Take 1 tablet (50 mg) by mouth nightly as needed for sleep    Primary insomnia       * traZODone 100 MG tablet    DESYREL    90 tablet    Take 1 tablet (100 mg) by mouth At Bedtime    Primary insomnia       vitamin D3 2000 units Caps     30 capsule    Take 1 tablet by mouth daily    Primary osteoarthritis of both knees       * Notice:  This list has 2 medication(s) that are the same as other medications prescribed for you. Read the directions carefully, and ask your doctor or other care provider to review them with you.

## 2018-10-03 NOTE — PROGRESS NOTES
Injectable Influenza Immunization Documentation    1.  Is the person to be vaccinated sick today?   No    2. Does the person to be vaccinated have an allergy to a component   of the vaccine?   No  Egg Allergy Algorithm Link    3. Has the person to be vaccinated ever had a serious reaction   to influenza vaccine in the past?   No    4. Has the person to be vaccinated ever had Guillain-Barré syndrome?   No    Form completed by Oanh Broussard LPN............. October 3, 2018 2:37 PM        Prior to injection verified patient identity using patient's name and date of birth.  Due to injection administration, patient instructed to remain in clinic for 15 minutes  afterwards, and to report any adverse reaction to me immediately.     Oanh Broussard LPN............. October 3, 2018 2:36 PM

## 2018-10-09 ENCOUNTER — OFFICE VISIT (OUTPATIENT)
Dept: FAMILY MEDICINE | Facility: OTHER | Age: 38
End: 2018-10-09
Attending: FAMILY MEDICINE
Payer: COMMERCIAL

## 2018-10-09 VITALS
RESPIRATION RATE: 16 BRPM | BODY MASS INDEX: 45.74 KG/M2 | DIASTOLIC BLOOD PRESSURE: 72 MMHG | SYSTOLIC BLOOD PRESSURE: 134 MMHG | WEIGHT: 277 LBS

## 2018-10-09 DIAGNOSIS — E03.8 OTHER SPECIFIED HYPOTHYROIDISM: Primary | ICD-10-CM

## 2018-10-09 DIAGNOSIS — M17.0 PRIMARY OSTEOARTHRITIS OF BOTH KNEES: ICD-10-CM

## 2018-10-09 LAB — TSH SERPL DL<=0.05 MIU/L-ACNC: 5.88 IU/ML (ref 0.34–5.6)

## 2018-10-09 PROCEDURE — 84443 ASSAY THYROID STIM HORMONE: CPT | Performed by: FAMILY MEDICINE

## 2018-10-09 PROCEDURE — 36415 COLL VENOUS BLD VENIPUNCTURE: CPT | Performed by: FAMILY MEDICINE

## 2018-10-09 PROCEDURE — 25000125 ZZHC RX 250: Performed by: FAMILY MEDICINE

## 2018-10-09 PROCEDURE — 99214 OFFICE O/P EST MOD 30 MIN: CPT | Mod: 25 | Performed by: FAMILY MEDICINE

## 2018-10-09 PROCEDURE — 20610 DRAIN/INJ JOINT/BURSA W/O US: CPT | Mod: 50 | Performed by: FAMILY MEDICINE

## 2018-10-09 RX ORDER — METHYLPREDNISOLONE ACETATE 80 MG/ML
80 INJECTION, SUSPENSION INTRA-ARTICULAR; INTRALESIONAL; INTRAMUSCULAR; SOFT TISSUE ONCE
Status: COMPLETED | OUTPATIENT
Start: 2018-10-09 | End: 2018-10-09

## 2018-10-09 RX ADMIN — METHYLPREDNISOLONE ACETATE 80 MG: 80 INJECTION, SUSPENSION INTRA-ARTICULAR; INTRALESIONAL; INTRAMUSCULAR; SOFT TISSUE at 15:24

## 2018-10-09 RX ADMIN — METHYLPREDNISOLONE ACETATE 80 MG: 80 INJECTION, SUSPENSION INTRA-ARTICULAR; INTRALESIONAL; INTRAMUSCULAR; SOFT TISSUE at 15:26

## 2018-10-09 ASSESSMENT — ANXIETY QUESTIONNAIRES
IF YOU CHECKED OFF ANY PROBLEMS ON THIS QUESTIONNAIRE, HOW DIFFICULT HAVE THESE PROBLEMS MADE IT FOR YOU TO DO YOUR WORK, TAKE CARE OF THINGS AT HOME, OR GET ALONG WITH OTHER PEOPLE: VERY DIFFICULT
GAD7 TOTAL SCORE: 10
7. FEELING AFRAID AS IF SOMETHING AWFUL MIGHT HAPPEN: MORE THAN HALF THE DAYS
2. NOT BEING ABLE TO STOP OR CONTROL WORRYING: MORE THAN HALF THE DAYS
5. BEING SO RESTLESS THAT IT IS HARD TO SIT STILL: NOT AT ALL
1. FEELING NERVOUS, ANXIOUS, OR ON EDGE: SEVERAL DAYS
3. WORRYING TOO MUCH ABOUT DIFFERENT THINGS: MORE THAN HALF THE DAYS
6. BECOMING EASILY ANNOYED OR IRRITABLE: MORE THAN HALF THE DAYS

## 2018-10-09 ASSESSMENT — ENCOUNTER SYMPTOMS
BRUISES/BLEEDS EASILY: 0
SLEEP DISTURBANCE: 1
ARTHRALGIAS: 1
UNEXPECTED WEIGHT CHANGE: 0
WEAKNESS: 0

## 2018-10-09 ASSESSMENT — PATIENT HEALTH QUESTIONNAIRE - PHQ9: 5. POOR APPETITE OR OVEREATING: SEVERAL DAYS

## 2018-10-09 ASSESSMENT — PAIN SCALES - GENERAL: PAINLEVEL: MILD PAIN (3)

## 2018-10-09 NOTE — PROGRESS NOTES
SUBJECTIVE:   Laverne Nguyen is a 38 year old female who presents to clinic today for the following health issues:    HPI  Follow up on knees, injections and thyroid concerns.  Is working at a group home now.    Last knee injections were 6/28/18.  She says these help for about 2 months perhaps.  More swelling when she is on her feet all day.  Wants to keep getting them.  Has been getting injections for years,  Results have remained pretty much constant.  Was told she needs bilateral replacements, but was not able to due to weight and young age.    She is on synthroid.  TSH went from 2 to 4, and based on her symptoms she wanted the dose increased, which was done.  Now wants a follow up TSH.  Low energy.  No weight changes.  Hard to fall asleep, wakes up often.  Often has cold sweats, but feels both hot and cold at the same time.  Mood is down as well.  Lots of stress with her , who asked her to move out.  His daughter has been in a treatment facility, and is likely to come home in 3 months.   seems to blame her for the girl's problems.      Results for orders placed or performed in visit on 01/05/18   TSH   Result Value Ref Range    TSH - Historical 4.87 0.34 - 5.60 uIU/mL   Comprehensive metabolic panel   Result Value Ref Range    Alkaline Phosphatase 100 34 - 104 IU/L    Globulin - Historical 3.3 2.0 - 3.7 g/dL    GFR If Not  - Historical >60 >60 ml/min/1.73m2    Glucose 100 70 - 105 mg/dL    AST (SGOT) - Historical 21 13 - 39 IU/L    Anion Gap - Historical 13 5 - 18    Sodium 139 133 - 143 mmol/L    A/G Ratio - Historical 1.3 1.0 - 2.0    Protein Total 7.7 6.4 - 8.9 g/dL    Creatinine 0.72 0.70 - 1.30 mg/dL    Carbon Dioxide 24 21 - 31 mmol/L    Chloride 102 98 - 107 mmol/L    Potassium 4.0 3.5 - 5.1 mmol/L    ALT (SGPT) - Historical 17 7 - 52 IU/L    Calcium 9.0 8.6 - 10.3 mg/dL    Bilirubin Total 0.5 0.3 - 1.0 mg/dL    GFR Estimate If Black >60 >60 ml/min/1.73m2    Urea Nitrogen  13 7 - 25 mg/dL    Albumin 4.4 3.5 - 5.7 g/dL    BUN/Creatinine Ratio - Historical 18    CBC with platelets differential   Result Value Ref Range    % Eosinophils 1.3 <8.0 %    White Blood Count - Historical 9.1 4.5 - 11.0 thou/cu mm    Absolute Monocytes - Historical 0.7 <0.9 thou/cu mm    Hemoglobin 14.0 12.0 - 16.0 g/dL    BASO 0.6 <3.0 %    RDW 11.9 11.5 - 15.5 %    Absolute Neutrophils - Historical 5.3 1.7 - 7.0 thou/cu mm    % Lymphocytes 31.2 20.0 - 44.0 %    % Neutrophils 58.8 42.0 - 72.0 %    Platelet Count 321 140 - 440 thou/cu mm    MCH 32.0 26.0 - 34.0 pg    MCV 95 80 - 100 fL    Hematocrit 41.5 33.0 - 51.0 %    Red Blood Count - Historical 4.38 4.00 - 5.20 mil/cu mm    Absolute Immature Granulocytes (Metas,Myelos,Pros) - Historical 0.0 <=0.3 thou/cu mm    Absolute Basophils - Historical 0.1 <0.3 thou/cu mm    Absolute Lymphocytes - Historical 2.8 0.9 - 2.9 thou/cu mm    % Monocytes 7.7 <12.0 %    MPV 10.1 6.5 - 11.0 fL    MCHC 33.7 32.0 - 36.0 g/dL    Absolute Eosinophils - Historical 0.1 <0.5 thou/cu mm    Immature Granulocytes (Metas,Myelos,Pros) - Historical 0.4 %       Patient Active Problem List    Diagnosis Date Noted     Tobacco abuse 03/07/2018     Priority: Medium     Morbid obesity (H) 03/07/2018     Priority: Medium     Overview:   2007, BMI 51       Essential hypertension 03/07/2018     Priority: Medium     Primary insomnia 02/28/2018     Priority: Medium     Lumbar facet joint syndrome 01/05/2018     Priority: Medium     Major depressive disorder, recurrent, mild (H) 01/02/2015     Priority: Medium     Pain medication agreement 11/20/2014     Priority: Medium     Overview:   Updated 5/6/2016       Degenerative arthritis of knee 11/10/2014     Priority: Medium     Avulsion fracture of ankle, right, closed, initial encounter 08/04/2014     Priority: Medium     Patellofemoral pain syndrome of left knee 08/04/2014     Priority: Medium     Rectal bleeding 01/09/2014     Priority: Medium      Otosclerosis 2013     Priority: Medium     Anxiety state 2013     Priority: Medium     Overview:   IMO Update       History of gastric bypass 2013     Priority: Medium     Idiopathic angioedema 2012     Priority: Medium     Other specified hypothyroidism 10/12/2010     Priority: Medium     B12 deficiency 09/15/2010     Priority: Medium     Overview:   secondary to gastric bypass       Past Surgical History:   Procedure Laterality Date     ADENOIDECTOMY           ANKLE SURGERY           COLONOSCOPY      1/10/2014,hyperplastic     LAPAROSCOPIC TUBAL LIGATION           OTHER SURGICAL HISTORY      10/86,84832.0,OR CREATE EARDRUM OPENING GEN ANESTH     OTHER SURGICAL HISTORY      ,86103.0,OR CREATE EARDRUM OPENING GEN ANESTH     OTHER SURGICAL HISTORY      ,332296,OTHER, with left PE tube     OTHER SURGICAL HISTORY      ,226223,OTHER     OTHER SURGICAL HISTORY      07,252082,OTHER, for obesity, Dr. Hogue Guy     Social History   Substance Use Topics     Smoking status: Former Smoker     Packs/day: 0.25     Types: Cigarettes     Smokeless tobacco: Never Used      Comment: Quit smokin CIGARETTES A WEEK     Alcohol use Yes      Comment: Alcoholic Drinks/day: occasionally 2/month     Current Outpatient Prescriptions   Medication Sig Dispense Refill     busPIRone (BUSPAR) 10 MG tablet Take 1 tablet (10 mg) by mouth 3 times daily 90 tablet 11     cetirizine (ZYRTEC) 10 MG tablet Take 1 tablet (10 mg) by mouth daily 90 tablet 3     Cholecalciferol (VITAMIN D3) 2000 units CAPS Take 1 tablet by mouth daily 30 capsule 11     clonazePAM (KLONOPIN) 1 MG tablet TAKE 1 TABLET BY MOUTH AT BEDTIME 30 tablet 0     cyanocobalamin (VITAMIN B12) 1000 MCG/ML injection Inject 1 mL (1,000 mcg) into the muscle every 30 days 3 mL 3     EPINEPHrine (EPIPEN/ADRENACLICK/OR ANY BX GENERIC EQUIV) 0.3 MG/0.3ML injection 2-pack Inject 0.3 mLs (0.3 mg) into the muscle as needed for  "anaphylaxis 0.6 mL 0     escitalopram (LEXAPRO) 20 MG tablet TAKE 1 TABLET BY MOUTH EVERY DAY 90 tablet 3     levothyroxine (SYNTHROID/LEVOTHROID) 100 MCG tablet Take 1 tablet (100 mcg) by mouth daily 90 tablet 3     lisinopril-hydrochlorothiazide (PRINZIDE/ZESTORETIC) 20-12.5 MG per tablet Take 2 tablets by mouth daily 180 tablet 3     metroNIDAZOLE (METROGEL) 1 % gel Apply topically daily 60 g 3     Multiple Vitamin (MULTI-VITAMINS) TABS Take 1 tablet by mouth       nicotine polacrilex (COMMIT) 2 MG lozenge TK 1 LOZENGE PO PRF TOBACCO CESSATION       syringe/needle, disp, (MEDSAVER SYRINGE) 25G X 1\" 3 ML MISC Inject 1 each into the muscle every 30 days 12 each 0     traMADol (ULTRAM) 50 MG tablet Take 1 tablet (50 mg) by mouth every 6 hours as needed for severe pain 90 tablet 5     traZODone (DESYREL) 100 MG tablet Take 1 tablet (100 mg) by mouth At Bedtime 90 tablet 3     [DISCONTINUED] traZODone (DESYREL) 50 MG tablet Take 1 tablet (50 mg) by mouth nightly as needed for sleep (Patient not taking: Reported on 10/9/2018) 90 tablet 3     Allergies   Allergen Reactions     Amoxicillin Other (See Comments)     Pt reports it is ineffective for her personally       Review of Systems   Constitutional: Negative for unexpected weight change.   Musculoskeletal: Positive for arthralgias.   Neurological: Negative for weakness.   Hematological: Does not bruise/bleed easily.   Psychiatric/Behavioral: Positive for sleep disturbance.        OBJECTIVE:     /72 (BP Location: Right arm, Patient Position: Sitting, Cuff Size: Adult Regular)  Resp 16  Wt 277 lb (125.6 kg)  LMP  (LMP Unknown)  BMI 45.74 kg/m2  Body mass index is 45.74 kg/(m^2).  Physical Exam   Constitutional: She is oriented to person, place, and time. She appears well-developed. No distress.   Musculoskeletal:   Knees without erythema, or effusions.  Consent signed.  Each were prepped and infiltrated with 3 ml 1% lidocaine and 80 milligram depot medrol in " superior lateral approach.  Tolerated well.   Neurological: She is alert and oriented to person, place, and time.   Skin: Skin is warm and dry. No rash noted. She is not diaphoretic. No erythema.   Psychiatric: She has a normal mood and affect. Her behavior is normal. Thought content normal.           ASSESSMENT/PLAN:         (E03.8) Other specified hypothyroidism  (primary encounter diagnosis)  Comment: her symptoms are more due to mood disorder than thyroid, most likely.  30 minutes with her over 1/2 in counseling.  Follow up in 3 months or so  Plan:      (M17.0) Primary osteoarthritis of both knees  Comment: slowly progressive.  Plan: DRAIN/INJECT LARGE JOINT/BURSA [03950]                 Alin Pimentel MD  [facility]    Results for orders placed or performed in visit on 10/09/18   TSH   Result Value Ref Range    Thyrotropin 5.88 (H) 0.34 - 5.60 IU/mL     Will fax in a higher dose of synthroid and repeat lab in 6-8 weeks.  Alin Pimentel MD on 10/10/2018 at 8:51 AM

## 2018-10-09 NOTE — MR AVS SNAPSHOT
After Visit Summary   10/9/2018    Laverne Nguyen    MRN: [id number]           Patient Information     Date Of Birth          1980        Visit Information        Provider Department      10/9/2018 1:15 PM Alin Pimentel MD [facility]        Today's Diagnoses     Other specified hypothyroidism    -  1    Primary osteoarthritis of both knees           Follow-ups after your visit        Who to contact     If you have questions or need follow up information about today's clinic visit or your schedule please contact [facility] directly at [phone].  Normal or non-critical lab and imaging results will be communicated to you by [employer], letter or phone within 4 business days after the clinic has received the results. If you do not hear from us within 7 days, please contact the clinic through [employer] or phone. If you have a critical or abnormal lab result, we will notify you by phone as soon as possible.  Submit refill requests through [employer] or call your pharmacy and they will forward the refill request to us. Please allow 3 business days for your refill to be completed.          Additional Information About Your Visit        MyChart Information     [employer] gives you secure access to your electronic health record. If you see a primary care provider, you can also send messages to your care team and make appointments. If you have questions, please call your primary care clinic.  If you do not have a primary care provider, please call [phone] and they will assist you.        Care EveryWhere ID     This is your Care EveryWhere ID. This could be used by other organizations to access your [place] medical records  [id number]        Your Vitals Were     Respirations Last Period BMI (Body Mass Index)             16 (LMP Unknown) 45.74 kg/m2          Blood Pressure from Last 3 Encounters:   10/09/18 134/72   06/28/18 142/76   03/08/18 124/70    Weight from  Last 3 Encounters:   10/09/18 277 lb (125.6 kg)   06/28/18 276 lb (125.2 kg)   03/08/18 277 lb 12.8 oz (126 kg)              We Performed the Following     DRAIN/INJECT LARGE JOINT/BURSA [20610]          Today's Medication Changes          These changes are accurate as of 10/9/18  1:56 PM.  If you have any questions, ask your nurse or doctor.               These medicines have changed or have updated prescriptions.        Dose/Directions    traZODone 100 MG tablet   Commonly known as:  DESYREL   This may have changed:  Another medication with the same name was removed. Continue taking this medication, and follow the directions you see here.   Used for:  Primary insomnia   Changed by:  Alin Pimentel MD        Dose:  100 mg   Take 1 tablet (100 mg) by mouth At Bedtime   Quantity:  90 tablet   Refills:  3                Primary Care Provider Office Phone # Fax #    Alin Pimentel [phone] [address] [facility] [address]        Equal Access to Services     [facility]: Hadii aad ku hadasho Soomaali, waaxda luqadaha, qaybta kaalmada adeegyada, waxay ridgein hayjaime will . So [facility] [phone].    ATENCIÓN: Si habla español, tiene a bronson disposición servicios gratuitos de asistencia lingüística. Llame al [phone].    We comply with applicable federal civil rights laws and [place] laws. We do not discriminate on the basis of race, color, national origin, age, disability, sex, sexual orientation, or gender identity.            Thank you!     Thank you for choosing [facility] AND [facility]  for your care. Our goal is always to provide you with excellent care. Hearing back from our patients is one way we can continue to improve our services. Please take a few minutes to complete the written survey that you may receive in the mail after your visit with us. Thank you!             Your Updated Medication List - Protect others around you: Learn how to safely use,  "store and throw away your medicines at www.disposemymeds.org.          This list is accurate as of 10/9/18  1:56 PM.  Always use your most recent med list.                   Brand Name Dispense Instructions for use Diagnosis    busPIRone 10 MG tablet    BUSPAR    90 tablet    Take 1 tablet (10 mg) by mouth 3 times daily    Generalized anxiety disorder       cetirizine 10 MG tablet    zyrTEC    90 tablet    Take 1 tablet (10 mg) by mouth daily    Environmental allergies       clonazePAM 1 MG tablet    klonoPIN    30 tablet    TAKE 1 TABLET BY MOUTH AT BEDTIME    Generalized anxiety disorder       cyanocobalamin 1000 MCG/ML injection    VITAMIN B12    3 mL    Inject 1 mL (1,000 mcg) into the muscle every 30 days    B12 deficiency       EPINEPHrine 0.3 MG/0.3ML injection 2-pack    EPIPEN/ADRENACLICK/or ANY BX GENERIC EQUIV    0.6 mL    Inject 0.3 mLs (0.3 mg) into the muscle as needed for anaphylaxis    Environmental allergies       escitalopram 20 MG tablet    LEXAPRO    90 tablet    TAKE 1 TABLET BY MOUTH EVERY DAY    Major depressive disorder, recurrent, mild (H)       levothyroxine 100 MCG tablet    SYNTHROID/LEVOTHROID    90 tablet    Take 1 tablet (100 mcg) by mouth daily    Other specified hypothyroidism       lisinopril-hydrochlorothiazide 20-12.5 MG per tablet    PRINZIDE/ZESTORETIC    180 tablet    Take 2 tablets by mouth daily    Essential hypertension       metroNIDAZOLE 1 % gel    METROGEL    60 g    Apply topically daily    Rosacea       MULTI-VITAMINS Tabs      Take 1 tablet by mouth    Other specified hypothyroidism       nicotine polacrilex 2 MG lozenge    COMMIT     TK 1 LOZENGE PO PRF TOBACCO CESSATION        syringe/needle (disp) 25G X 1\" 3 ML Misc    MEDSAVER SYRINGE    12 each    Inject 1 each into the muscle every 30 days    B12 deficiency       traMADol 50 MG tablet    ULTRAM    90 tablet    Take 1 tablet (50 mg) by mouth every 6 hours as needed for severe pain    Lumbar sprain, initial " encounter       traZODone 100 MG tablet    DESYREL    90 tablet    Take 1 tablet (100 mg) by mouth At Bedtime    Primary insomnia       vitamin D3 2000 units Caps     30 capsule    Take 1 tablet by mouth daily    Primary osteoarthritis of both knees

## 2018-10-09 NOTE — NURSING NOTE
Coming in for injections in both knees, thyroid check and refill medications  Yadira Warner LPN on 10/9/2018 at 12:47 PM

## 2018-10-10 ENCOUNTER — TELEPHONE (OUTPATIENT)
Dept: FAMILY MEDICINE | Facility: OTHER | Age: 38
End: 2018-10-10

## 2018-10-10 ENCOUNTER — MYC MEDICAL ADVICE (OUTPATIENT)
Dept: FAMILY MEDICINE | Facility: OTHER | Age: 38
End: 2018-10-10

## 2018-10-10 DIAGNOSIS — E03.8 OTHER SPECIFIED HYPOTHYROIDISM: Primary | ICD-10-CM

## 2018-10-10 RX ORDER — LEVOTHYROXINE SODIUM 125 UG/1
125 TABLET ORAL DAILY
Qty: 90 TABLET | Refills: 3 | Status: SHIPPED | OUTPATIENT
Start: 2018-10-10 | End: 2018-10-10

## 2018-10-10 RX ORDER — LEVOTHYROXINE SODIUM 150 UG/1
150 TABLET ORAL DAILY
Qty: 90 TABLET | Refills: 3 | Status: SHIPPED | OUTPATIENT
Start: 2018-10-10 | End: 2019-01-10

## 2018-10-10 ASSESSMENT — PATIENT HEALTH QUESTIONNAIRE - PHQ9: SUM OF ALL RESPONSES TO PHQ QUESTIONS 1-9: 5

## 2018-10-10 ASSESSMENT — ANXIETY QUESTIONNAIRES: GAD7 TOTAL SCORE: 10

## 2018-10-10 NOTE — TELEPHONE ENCOUNTER
Please refer to associated telephone encounter. Valorie Castorena RN .............. 10/10/2018  12:17 PM

## 2018-10-10 NOTE — TELEPHONE ENCOUNTER
"Patient called today, wanting to discuss prescription that was just changed.    Noted [employer] Message sent today by Patient at 0945:  My thyroid level is way worse since, Im actually not normal range I'm hoping my dose increases significantly to get me back down to a two. With the increase from 88 to 100 didn't even touch it and made worse please let me know what the increases are  and if I should be worried any further about something worse happening. Thank you, Laverne Nguyen     Per OV notes from yesterday (10/9/18), levothyroxine increased from 100 mcg to 125 mcg daily. Patient was bumped from 88 to 100 mcg back on 6/28.  Thyrotropin 5.88 (H) 0.34 - 5.60 IU/mL Final 10/09/2018  2:17 PM [facility]     Called and spoke to Patient after verifying last name and date of birth. Pt states, \"I don't feel like the 25 mcg increase will make much of a difference, I am afraid that I will have it rechecked in 3 months and it will be little change and a waste of 3 months of me not feeling well.\"    Patient is requesting concern be sent to PCP for further consideration. Valorie Castorena RN .............. 10/10/2018  12:15 PM    "

## 2018-10-10 NOTE — TELEPHONE ENCOUNTER
Notified patient per Alin Pimentel MD, he has sent in Levothyroxine 150mcg.  She verbally understood and will call with any questions or concerns.    Valorie Valentin LPN............10/10/2018 1:23 PM

## 2018-10-29 DIAGNOSIS — F41.1 GENERALIZED ANXIETY DISORDER: ICD-10-CM

## 2018-10-31 RX ORDER — CLONAZEPAM 1 MG/1
TABLET ORAL
Qty: 90 TABLET | Refills: 1 | Status: SHIPPED | OUTPATIENT
Start: 2018-10-31 | End: 2019-01-10

## 2018-10-31 NOTE — TELEPHONE ENCOUNTER
[facility] sent Rx request for the following:     CLONAZEPAM 1MG TABLETS  TAKE 1 TABLET BY MOUTH AT BEDTIME  Last Written Prescription Date:  9/20/18  Last Fill Quantity: 30,   # refills: 0    Last Office Visit: 10/9/18  Future Office visit: Pt due to return, around 1/9/18.    Routing refill request to provider for review/approval because:  Drug not on the [facility], [facility] or [facility] refill protocol or controlled substance    Unable to complete prescription refill per RN Medication Refill Policy. Valorie Castorena RN .............. 10/31/2018  9:10 AM

## 2018-11-04 ENCOUNTER — TELEPHONE (OUTPATIENT)
Dept: FAMILY MEDICINE | Facility: OTHER | Age: 38
End: 2018-11-04

## 2018-11-04 ENCOUNTER — OFFICE VISIT (OUTPATIENT)
Dept: FAMILY MEDICINE | Facility: OTHER | Age: 38
End: 2018-11-04
Payer: COMMERCIAL

## 2018-11-04 ENCOUNTER — HOSPITAL ENCOUNTER (OUTPATIENT)
Dept: GENERAL RADIOLOGY | Facility: OTHER | Age: 38
Discharge: HOME | End: 2018-11-04
Attending: NURSE PRACTITIONER | Admitting: NURSE PRACTITIONER
Payer: COMMERCIAL

## 2018-11-04 VITALS
HEIGHT: 65 IN | DIASTOLIC BLOOD PRESSURE: 70 MMHG | SYSTOLIC BLOOD PRESSURE: 108 MMHG | HEART RATE: 86 BPM | TEMPERATURE: 98.5 F | BODY MASS INDEX: 46.35 KG/M2 | WEIGHT: 278.2 LBS
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
## 2018-11-04 DIAGNOSIS — M25.562 ACUTE PAIN OF LEFT KNEE: Primary | ICD-10-CM

## 2018-11-04 DIAGNOSIS — M25.562 ACUTE PAIN OF LEFT KNEE: ICD-10-CM

## 2018-11-04 PROCEDURE — 73562 X-RAY EXAM OF KNEE 3: CPT | Mod: LT

## 2018-11-04 PROCEDURE — 99213 OFFICE O/P EST LOW 20 MIN: CPT | Performed by: NURSE PRACTITIONER

## 2018-11-04 ASSESSMENT — PAIN SCALES - GENERAL: PAINLEVEL: EXTREME PAIN (8)

## 2018-11-04 NOTE — TELEPHONE ENCOUNTER
Patient called wondering if radiologist read Xray and if there is a break or not. Patient was also wondering if [facility] had any sooner appointments: he does NOT but can send telephone request for work-in if patient wants.    Heidy Diehl on 11/4/2018 at 2:55 PM    After verifying last name and date of birth, offered patient to send a note to [facility] to work in sooner, but appt made today was soonest available in the schedule. Patient declined to have a note sent to be worked in. Will follow up with [facility]. Reinforced caring for knee effusion with pain relievers, ice and elevation.    Carlee Adkins LPN....................  11/4/2018   3:08 PM

## 2018-11-04 NOTE — MR AVS SNAPSHOT
After Visit Summary   11/4/2018    Laverne Nguyen    MRN: [id number]           Patient Information     Date Of Birth          1980        Visit Information        Provider Department      11/4/2018 11:00 AM Giovana Chew APRN CNP [facility]        Today's Diagnoses     Acute pain of left knee    -  1    Fall, initial encounter          Care Instructions    Knee brace for all weight bearing  Ice often  Rest/elevation  Tylenol for pain  Follow up with primary care          Follow-ups after your visit        Future tests that were ordered for you today     Open Future Orders        Priority Expected Expires Ordered    XR Knee Left 3 Views Routine 11/4/2018 11/4/2019 11/4/2018            Who to contact     If you have questions or need follow up information about today's clinic visit or your schedule please contact [facility] directly at [phone].  Normal or non-critical lab and imaging results will be communicated to you by [employer], letter or phone within 4 business days after the clinic has received the results. If you do not hear from us within 7 days, please contact the clinic through [employer] or phone. If you have a critical or abnormal lab result, we will notify you by phone as soon as possible.  Submit refill requests through [employer] or call your pharmacy and they will forward the refill request to us. Please allow 3 business days for your refill to be completed.          Additional Information About Your Visit        MyChart Information     [employer] gives you secure access to your electronic health record. If you see a primary care provider, you can also send messages to your care team and make appointments. If you have questions, please call your primary care clinic.  If you do not have a primary care provider, please call [phone] and they will assist you.        Care EveryWhere ID     This is your Care EveryWhere ID. This could be used  "by other organizations to access your [facility] medical records  [id number]        Your Vitals Were     Pulse Temperature Height Last Period Breastfeeding? BMI (Body Mass Index)    86 98.5  F (36.9  C) (Tympanic) 5' 5\" (1.651 m) 10/21/2018 (Approximate) No 46.29 kg/m2       Blood Pressure from Last 3 Encounters:   11/04/18 108/70   10/09/18 134/72   06/28/18 142/76    Weight from Last 3 Encounters:   11/04/18 278 lb 3.2 oz (126.2 kg)   10/09/18 277 lb (125.6 kg)   06/28/18 276 lb (125.2 kg)                 Today's Medication Changes          These changes are accurate as of 11/4/18 11:38 AM.  If you have any questions, ask your nurse or doctor.               Start taking these medicines.        Dose/Directions    order for DME   Used for:  Fall, initial encounter, Acute pain of left knee   Started by:  Giovana Chew APRN CNP        Equipment being ordered: knee brace   Quantity:  1 each   Refills:  0            Where to get your medicines      Some of these will need a paper prescription and others can be bought over the counter.  Ask your nurse if you have questions.     Bring a paper prescription for each of these medications     order for DME                Primary Care Provider Office Phone # Fax #    Alin Pimentel [phone]       [address] [facility] [address]        Equal Access to Services     [facility] AH: Hadii christal deano Sonicholas, waaxda luqadaha, qaybta kaalmada isa, jose will . So [facility] [phone].    ATENCIÓN: Si habla español, tiene a bronson disposición servicios gratuitos de asistencia lingüística. Llame al [phone].    We comply with applicable federal civil rights laws and [place] laws. We do not discriminate on the basis of race, color, national origin, age, disability, sex, sexual orientation, or gender identity.            Thank you!     Thank you for choosing [facility] AND Hospitals in [place]  for your care. Our " goal is always to provide you with excellent care. Hearing back from our patients is one way we can continue to improve our services. Please take a few minutes to complete the written survey that you may receive in the mail after your visit with us. Thank you!             Your Updated Medication List - Protect others around you: Learn how to safely use, store and throw away your medicines at www.disposemymeds.org.          This list is accurate as of 11/4/18 11:38 AM.  Always use your most recent med list.                   Brand Name Dispense Instructions for use Diagnosis    busPIRone 10 MG tablet    BUSPAR    90 tablet    Take 1 tablet (10 mg) by mouth 3 times daily    Generalized anxiety disorder       cetirizine 10 MG tablet    zyrTEC    90 tablet    Take 1 tablet (10 mg) by mouth daily    Environmental allergies       clonazePAM 1 MG tablet    klonoPIN    90 tablet    TAKE 1 TABLET BY MOUTH AT BEDTIME    Generalized anxiety disorder       cyanocobalamin 1000 MCG/ML injection    VITAMIN B12    3 mL    Inject 1 mL (1,000 mcg) into the muscle every 30 days    B12 deficiency       EPINEPHrine 0.3 MG/0.3ML injection 2-pack    EPIPEN/ADRENACLICK/or ANY BX GENERIC EQUIV    0.6 mL    Inject 0.3 mLs (0.3 mg) into the muscle as needed for anaphylaxis    Environmental allergies       escitalopram 20 MG tablet    LEXAPRO    90 tablet    TAKE 1 TABLET BY MOUTH EVERY DAY    Major depressive disorder, recurrent, mild (H)       levothyroxine 150 MCG tablet    SYNTHROID/LEVOTHROID    90 tablet    Take 1 tablet (150 mcg) by mouth daily    Other specified hypothyroidism       lisinopril-hydrochlorothiazide 20-12.5 MG per tablet    PRINZIDE/ZESTORETIC    180 tablet    Take 2 tablets by mouth daily    Essential hypertension       metroNIDAZOLE 1 % gel    METROGEL    60 g    Apply topically daily    Rosacea       MULTI-VITAMINS Tabs      Take 1 tablet by mouth    Other specified hypothyroidism       nicotine polacrilex 2 MG lozenge  "   COMMIT     TK 1 LOZENGE PO PRF TOBACCO CESSATION        order for DME     1 each    Equipment being ordered: knee brace    Fall, initial encounter, Acute pain of left knee       syringe/needle (disp) 25G X 1\" 3 ML Misc    MEDSAVER SYRINGE    12 each    Inject 1 each into the muscle every 30 days    B12 deficiency       traMADol 50 MG tablet    ULTRAM    90 tablet    Take 1 tablet (50 mg) by mouth every 6 hours as needed for severe pain    Lumbar sprain, initial encounter       traZODone 100 MG tablet    DESYREL    90 tablet    Take 1 tablet (100 mg) by mouth At Bedtime    Primary insomnia       vitamin D3 2000 units Caps     30 capsule    Take 1 tablet by mouth daily    Primary osteoarthritis of both knees         "

## 2018-11-04 NOTE — PROGRESS NOTES
HPI:    Laverne Nguyen is a 38 year old female who presents to clinic today for left knee pain. She was at a [employer] party on 10/31 and fell on her knee on the cement. She has had constant throbbing pain since then. She has less pain with laying in bed. Using ice which is helpful. Taking tramadol for the pain, had this at home for other issues. Unable to take ibuprofen due to hx of gastric bypass. She does have chronic knee pain but feels this is different. Has had the feeling of knee giving out. Having some clicking and grinding.     Past Medical History:   Diagnosis Date     Cannabis abuse, uncomplicated     use found on urine drug screen     Encounter for general adult medical examination without abnormal findings     No Comments Provided     Essential (primary) hypertension          Gastro-esophageal reflux disease without esophagitis     No Comments Provided     Low back pain     No Comments Provided     Morbid (severe) obesity due to excess calories (H)     07,with a body mass index of 51     Other specified anxiety disorders     No Comments Provided     Otitis externa of both ears     Recurrent     Personal history of other medical treatment (CODE)      I, para 1.     Tobacco use     No Comments Provided       Past Surgical History:   Procedure Laterality Date     ADENOIDECTOMY           ANKLE SURGERY           COLONOSCOPY      1/10/2014,hyperplastic     LAPAROSCOPIC TUBAL LIGATION           OTHER SURGICAL HISTORY      10/86,93504.0,FL CREATE EARDRUM OPENING GEN ANESTH     OTHER SURGICAL HISTORY      ,50311.0,FL CREATE EARDRUM OPENING GEN ANESTH     OTHER SURGICAL HISTORY      ,046529,OTHER, with left PE tube     OTHER SURGICAL HISTORY      ,842360,OTHER     OTHER SURGICAL HISTORY      07,405886,OTHER, for obesity, Wandy Dunn       Family History   Problem Relation Age of Onset     Hypertension Mother      Hypertension     Other - See Comments  Mother      History of depression     Hypertension Brother      Hypertension       Social History     Social History     Marital status:      Spouse name: N/A     Number of children: N/A     Years of education: N/A     Occupational History     Not on file.     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       to Lenny,     1 Daughter, Anjali, 2005    1 Step daughter       Current Outpatient Prescriptions   Medication Sig Dispense Refill     busPIRone (BUSPAR) 10 MG tablet Take 1 tablet (10 mg) by mouth 3 times daily 90 tablet 11     cetirizine (ZYRTEC) 10 MG tablet Take 1 tablet (10 mg) by mouth daily 90 tablet 3     Cholecalciferol (VITAMIN D3) 2000 units CAPS Take 1 tablet by mouth daily 30 capsule 11     clonazePAM (KLONOPIN) 1 MG tablet TAKE 1 TABLET BY MOUTH AT BEDTIME 90 tablet 1     cyanocobalamin (VITAMIN B12) 1000 MCG/ML injection Inject 1 mL (1,000 mcg) into the muscle every 30 days 3 mL 3     EPINEPHrine (EPIPEN/ADRENACLICK/OR ANY BX GENERIC EQUIV) 0.3 MG/0.3ML injection 2-pack Inject 0.3 mLs (0.3 mg) into the muscle as needed for anaphylaxis 0.6 mL 0     escitalopram (LEXAPRO) 20 MG tablet TAKE 1 TABLET BY MOUTH EVERY DAY 90 tablet 3     levothyroxine (SYNTHROID/LEVOTHROID) 150 MCG tablet Take 1 tablet (150 mcg) by mouth daily 90 tablet 3     lisinopril-hydrochlorothiazide (PRINZIDE/ZESTORETIC) 20-12.5 MG per tablet Take 2 tablets by mouth daily 180 tablet 3     metroNIDAZOLE (METROGEL) 1 % gel Apply topically daily  "60 g 3     Multiple Vitamin (MULTI-VITAMINS) TABS Take 1 tablet by mouth       nicotine polacrilex (COMMIT) 2 MG lozenge TK 1 LOZENGE PO PRF TOBACCO CESSATION       order for DME Equipment being ordered: knee brace 1 each 0     syringe/needle, disp, (MEDSAVER SYRINGE) 25G X 1\" 3 ML MISC Inject 1 each into the muscle every 30 days 12 each 0     traMADol (ULTRAM) 50 MG tablet Take 1 tablet (50 mg) by mouth every 6 hours as needed for severe pain 90 tablet 5     traZODone (DESYREL) 100 MG tablet Take 1 tablet (100 mg) by mouth At Bedtime 90 tablet 3       Allergies   Allergen Reactions     Amoxicillin Other (See Comments)     Pt reports it is ineffective for her personally       ROS:  Pertinent positives and negatives are noted in HPI.    EXAM:  General appearance: well appearing overweight female, in no acute distress  Musculoskeletal: mild effusion noted to left knee just distal to patella. She has pain over this effusion as well as to left medial knee. Normal ROM with pain at full extension. Crepitus noted bilaterally. Favoring left leg with walking. Mild laxity noted.   Dermatological: no rashes or lesions  Psychological: normal affect, alert and pleasant  Xray: xray independently reviewed and no acute fx appreciated; pending radiology over-read    ASSESSMENT AND PLAN:    1. Acute pain of left knee    2. Fall, initial encounter      Plan to place her in a short runner knee brace due to laxity and feeling of knee giving out. She will add tylenol to pain medications, use ice, elevation and plan to f/u with PCP next week for further evaluation. All questions were answered and she is in agreement with plan.         Giovana Chew..................11/4/2018 11:14 AM  "

## 2018-11-04 NOTE — PATIENT INSTRUCTIONS
Knee brace for all weight bearing  Ice often  Rest/elevation  Tylenol for pain  Follow up with primary care

## 2018-11-04 NOTE — NURSING NOTE
"Patient presents to clinic with knee pain since 10/31, fell after a halloween party. Patient has been using pain relievers, elevation, and ice with out relief.  Carlee Adkins LPN....................  11/4/2018   11:04 AM    Chief Complaint   Patient presents with     Musculoskeletal Problem     left knee       Initial LMP  (LMP Unknown) Estimated body mass index is 45.74 kg/(m^2) as calculated from the following:    Height as of 11/13/17: 5' 5.25\" (1.657 m).    Weight as of 10/9/18: 277 lb (125.6 kg).  Medication Reconciliation: complete    Carlee Adkins LPN    "

## 2019-01-02 ENCOUNTER — OFFICE VISIT (OUTPATIENT)
Dept: FAMILY MEDICINE | Facility: OTHER | Age: 39
End: 2019-01-02
Attending: NURSE PRACTITIONER
Payer: COMMERCIAL

## 2019-01-02 ENCOUNTER — HOSPITAL ENCOUNTER (OUTPATIENT)
Dept: GENERAL RADIOLOGY | Facility: OTHER | Age: 39
Discharge: HOME | End: 2019-01-02
Attending: NURSE PRACTITIONER | Admitting: NURSE PRACTITIONER
Payer: COMMERCIAL

## 2019-01-02 VITALS
SYSTOLIC BLOOD PRESSURE: 110 MMHG | HEART RATE: 84 BPM | TEMPERATURE: 97.9 F | BODY MASS INDEX: 46.82 KG/M2 | WEIGHT: 281 LBS | HEIGHT: 65 IN | DIASTOLIC BLOOD PRESSURE: 62 MMHG

## 2019-01-02 DIAGNOSIS — H61.21 IMPACTED CERUMEN OF RIGHT EAR: ICD-10-CM

## 2019-01-02 DIAGNOSIS — R06.89 DECREASED BREATH SOUNDS: Primary | ICD-10-CM

## 2019-01-02 DIAGNOSIS — J06.9 VIRAL URI WITH COUGH: ICD-10-CM

## 2019-01-02 PROCEDURE — 99213 OFFICE O/P EST LOW 20 MIN: CPT | Performed by: NURSE PRACTITIONER

## 2019-01-02 PROCEDURE — 69209 REMOVE IMPACTED EAR WAX UNI: CPT | Mod: RT | Performed by: NURSE PRACTITIONER

## 2019-01-02 PROCEDURE — 71046 X-RAY EXAM CHEST 2 VIEWS: CPT

## 2019-01-02 ASSESSMENT — MIFFLIN-ST. JEOR: SCORE: 1965.49

## 2019-01-02 NOTE — NURSING NOTE
Patient presents to clinic with cough that she states started on 12/23/2018. She also states she has sinus pain and pressure.  Sveta Siu ....................  1/2/2019   3:30 PM

## 2019-01-02 NOTE — PROGRESS NOTES
SUBJECTIVE:   Laverne Nguyen is a 38 year old female who presents to clinic today for the following health issues:    Acute Illness   Acute illness concerns: cough  Onset: 12/23/18    Fever: YES- did not take temperature, but felt hot    Chills/Sweats: YES    Headache (location?): YES- sinus and frontal    Sinus Pressure:YES- tender, post-nasal drainage and mucopurulent discharge    Conjunctivitis:  no    Ear Pain: YES: bilateral    Rhinorrhea: YES    Congestion: YES    Sore Throat: YES     Cough: YES-productive of green sputum, worsening over time    Wheeze: YES    Decreased Appetite: YES    Nausea: no    Vomiting: no    Diarrhea:  no    Dysuria/Freq.: no    Fatigue/Achiness: YES    Sick/Strep Exposure: YES- brother had pneumonia, daughter has been sick for a few weeks, coworkers have all had upper resp infections as well     Therapies Tried and outcome: kim seltzer, dayquil, nyquil, soup, cold things, sleep    Problem list and histories reviewed & adjusted, as indicated.  Additional history: as documented    Patient Active Problem List   Diagnosis     Primary insomnia     Tobacco abuse     Rectal bleeding     Pain medication agreement     Otosclerosis     Morbid obesity (H)     Major depressive disorder, recurrent, mild (H)     Lumbar facet joint syndrome     Idiopathic angioedema     Other specified hypothyroidism     Essential hypertension     Degenerative arthritis of knee     B12 deficiency     Anxiety state     Avulsion fracture of ankle, right, closed, initial encounter     History of gastric bypass     Patellofemoral pain syndrome of left knee     Past Surgical History:   Procedure Laterality Date     ADENOIDECTOMY      03/87     ANKLE SURGERY      8/14     COLONOSCOPY      1/10/2014,hyperplastic     LAPAROSCOPIC TUBAL LIGATION      2009     OTHER SURGICAL HISTORY      10/86,56454.0,CT CREATE EARDRUM OPENING GEN ANESTH     OTHER SURGICAL HISTORY      1980,01900.0,CT CREATE EARDRUM OPENING GEN ANESTH      OTHER SURGICAL HISTORY      ,OTHER, with left PE tube     OTHER SURGICAL HISTORY      ,,OTHER     OTHER SURGICAL HISTORY      07,,OTHER, for obesity, Wandy Dunn       Social History     Tobacco Use     Smoking status: Former Smoker     Packs/day: 0.25     Types: Cigarettes     Smokeless tobacco: Never Used     Tobacco comment: Quit smokin CIGARETTES A WEEK   Substance Use Topics     Alcohol use: Yes     Comment: Alcoholic Drinks/day: occasionally 2/month     Family History   Problem Relation Age of Onset     Hypertension Mother         Hypertension     Other - See Comments Mother         History of depression     Hypertension Brother         Hypertension         Current Outpatient Medications   Medication Sig Dispense Refill     busPIRone (BUSPAR) 10 MG tablet Take 1 tablet (10 mg) by mouth 3 times daily 90 tablet 11     cetirizine (ZYRTEC) 10 MG tablet Take 1 tablet (10 mg) by mouth daily 90 tablet 3     Cholecalciferol (VITAMIN D3) 2000 units CAPS Take 1 tablet by mouth daily 30 capsule 11     clonazePAM (KLONOPIN) 1 MG tablet TAKE 1 TABLET BY MOUTH AT BEDTIME 90 tablet 1     cyanocobalamin (VITAMIN B12) 1000 MCG/ML injection Inject 1 mL (1,000 mcg) into the muscle every 30 days 3 mL 3     EPINEPHrine (EPIPEN/ADRENACLICK/OR ANY BX GENERIC EQUIV) 0.3 MG/0.3ML injection 2-pack Inject 0.3 mLs (0.3 mg) into the muscle as needed for anaphylaxis 0.6 mL 0     escitalopram (LEXAPRO) 20 MG tablet TAKE 1 TABLET BY MOUTH EVERY DAY 90 tablet 3     levothyroxine (SYNTHROID/LEVOTHROID) 150 MCG tablet Take 1 tablet (150 mcg) by mouth daily 90 tablet 3     lisinopril-hydrochlorothiazide (PRINZIDE/ZESTORETIC) 20-12.5 MG per tablet Take 2 tablets by mouth daily 180 tablet 3     metroNIDAZOLE (METROGEL) 1 % gel Apply topically daily 60 g 3     Multiple Vitamin (MULTI-VITAMINS) TABS Take 1 tablet by mouth       order for DME Equipment being ordered: knee brace 1 each 0      "syringe/needle, disp, (MEDSAVER SYRINGE) 25G X 1\" 3 ML MISC Inject 1 each into the muscle every 30 days 12 each 0     traMADol (ULTRAM) 50 MG tablet Take 1 tablet (50 mg) by mouth every 6 hours as needed for severe pain 90 tablet 5     traZODone (DESYREL) 100 MG tablet Take 1 tablet (100 mg) by mouth At Bedtime 90 tablet 3     Allergies   Allergen Reactions     Amoxicillin Other (See Comments)     Pt reports it is ineffective for her personally       Reviewed and updated as needed this visit by clinical staff  Tobacco  Allergies  Meds  Problems  Med Hx  Surg Hx  Fam Hx  Soc Hx        Reviewed and updated as needed this visit by Provider  Tobacco  Allergies  Meds  Problems  Med Hx  Surg Hx  Fam Hx  Soc Hx          ROS:  As above    OBJECTIVE:     /62   Pulse 84   Temp 97.9  F (36.6  C)   Ht 1.651 m (5' 5\")   Wt 127.5 kg (281 lb)   LMP 12/12/2018   Breastfeeding? No   BMI 46.76 kg/m    Body mass index is 46.76 kg/m .  GENERAL: healthy, alert and no distress  EYES: Eyes grossly normal to inspection, PERRL and conjunctivae and sclerae normal  HENT: normal cephalic/atraumatic, L ear: clear effusion, R ear impacted with cerumen nasal mucosa edematous , rhinorrhea clear, oral mucous membranes moist, sinuses: not tender and cobblestoning of posterior oropharynx  NECK: no adenopathy, no asymmetry, masses, or scars and thyroid normal to palpation  RESP: decreased breath sounds bibasilar  CV: regular rate and rhythm, normal S1 S2, no S3 or S4, no murmur, click or rub, no peripheral edema and peripheral pulses strong    Diagnostic Test Results:  Results for orders placed or performed in visit on 01/02/19 (from the past 24 hour(s))   XR Chest 2 Views    Narrative    PROCEDURE:  XR CHEST 2 VW    HISTORY: Decreased breath sounds, .    COMPARISON:  4/17/2017    FINDINGS:  The cardiomediastinal contours are normal.  The trachea is midline.  No focal consolidation, effusion or pneumothorax.    No suspicious " osseous lesion or subdiaphragmatic free air.      Impression    IMPRESSION:      No acute cardiopulmonary process.      NICKY SAEED MD       ASSESSMENT/PLAN:     1. Decreased breath sounds  CXR as above, unremarkable.  - XR Chest 2 Views    2. Viral URI with cough  Symptomatic treatments recommended.  -Discussed that antibiotics would not help symptoms of viral URI. Education provided on symptoms of secondary bacterial infection such as new fever, chills, rigors, shortness of breath, increased work of breathing, that can occur with viral URI and need for further evaluation, if they occur.   - Ensure you are staying hydrated by drinking plenty of fluids or eating foods such as popsicles, jello, pudding.  - Honey and Salt water gurgles can help soothe sore throat  - Rest  - Humidifier can help with congestion and help keep mucus membranes such as throat and nose from drying out.  - Sleeping slightly propped up can help with congestion and postnasal drainage that can worsen cough at bedtime.  - As long as you have never been told to take Tylenol and/or Ibuprofen you can use them to manage fever and body aches per package instructions  Make sure you eat when you take ibuprofen to avoid stomach upset.  - OTC cough medications per package instructions to help with cough. Check to see if the cough/cold medication already has acetaminophen (Tylenol) in it. If it does avoid taking additional Tylenol.  - If sudden onset of new fever, worsening symptoms return for further evaluation.  - OTC antihistamine such as Allegra, Zyrtec, Claritin (generic is okay) can help with nasal/sinus congestion and OTC nasal steroid such as Flonase can help decrease sinus inflammation to help with congestion.      3. Impacted cerumen of right ear  Ear flushed today in office.       Mana Luna NP  [facility]

## 2019-01-02 NOTE — PATIENT INSTRUCTIONS
ASSESSMENT/PLAN:     1. Decreased breath sounds  CXR as above, unremarkable.  - XR Chest 2 Views    2. Viral URI with cough  Symptomatic treatments recommended.  -Discussed that antibiotics would not help symptoms of viral URI. Education provided on symptoms of secondary bacterial infection such as new fever, chills, rigors, shortness of breath, increased work of breathing, that can occur with viral URI and need for further evaluation, if they occur.   - Ensure you are staying hydrated by drinking plenty of fluids or eating foods such as popsicles, jello, pudding.  - Honey and Salt water gurgles can help soothe sore throat  - Rest  - Humidifier can help with congestion and help keep mucus membranes such as throat and nose from drying out.  - Sleeping slightly propped up can help with congestion and postnasal drainage that can worsen cough at bedtime.  - As long as you have never been told to take Tylenol and/or Ibuprofen you can use them to manage fever and body aches per package instructions  Make sure you eat when you take ibuprofen to avoid stomach upset.  - OTC cough medications per package instructions to help with cough. Check to see if the cough/cold medication already has acetaminophen (Tylenol) in it. If it does avoid taking additional Tylenol.  - If sudden onset of new fever, worsening symptoms return for further evaluation.  - OTC antihistamine such as Allegra, Zyrtec, Claritin (generic is okay) can help with nasal/sinus congestion and OTC nasal steroid such as Flonase can help decrease sinus inflammation to help with congestion.      3. Impacted cerumen of right ear  Ear flushed today in office.       Maan Luna NP  [facility]

## 2019-01-10 ENCOUNTER — OFFICE VISIT (OUTPATIENT)
Dept: FAMILY MEDICINE | Facility: OTHER | Age: 39
End: 2019-01-10
Attending: FAMILY MEDICINE
Payer: COMMERCIAL

## 2019-01-10 VITALS
SYSTOLIC BLOOD PRESSURE: 128 MMHG | BODY MASS INDEX: 46.13 KG/M2 | WEIGHT: 277.2 LBS | DIASTOLIC BLOOD PRESSURE: 70 MMHG | HEART RATE: 60 BPM | RESPIRATION RATE: 16 BRPM

## 2019-01-10 DIAGNOSIS — F33.0 MAJOR DEPRESSIVE DISORDER, RECURRENT, MILD (H): ICD-10-CM

## 2019-01-10 DIAGNOSIS — S33.5XXA LUMBAR SPRAIN, INITIAL ENCOUNTER: ICD-10-CM

## 2019-01-10 DIAGNOSIS — E03.8 OTHER SPECIFIED HYPOTHYROIDISM: Primary | ICD-10-CM

## 2019-01-10 DIAGNOSIS — M17.0 PRIMARY OSTEOARTHRITIS OF BOTH KNEES: ICD-10-CM

## 2019-01-10 DIAGNOSIS — F51.01 PRIMARY INSOMNIA: ICD-10-CM

## 2019-01-10 DIAGNOSIS — E66.01 MORBID OBESITY (H): ICD-10-CM

## 2019-01-10 LAB — TSH SERPL DL<=0.05 MIU/L-ACNC: 2.55 IU/ML (ref 0.34–5.6)

## 2019-01-10 PROCEDURE — 20610 DRAIN/INJ JOINT/BURSA W/O US: CPT | Mod: 50 | Performed by: FAMILY MEDICINE

## 2019-01-10 PROCEDURE — 84443 ASSAY THYROID STIM HORMONE: CPT | Performed by: FAMILY MEDICINE

## 2019-01-10 PROCEDURE — 36415 COLL VENOUS BLD VENIPUNCTURE: CPT | Performed by: FAMILY MEDICINE

## 2019-01-10 PROCEDURE — 25000125 ZZHC RX 250

## 2019-01-10 PROCEDURE — 99214 OFFICE O/P EST MOD 30 MIN: CPT | Mod: 25 | Performed by: FAMILY MEDICINE

## 2019-01-10 RX ORDER — TRAZODONE HYDROCHLORIDE 100 MG/1
200 TABLET ORAL AT BEDTIME
Qty: 180 TABLET | Refills: 3 | Status: SHIPPED | OUTPATIENT
Start: 2019-01-10 | End: 2020-03-19

## 2019-01-10 RX ORDER — LEVOTHYROXINE SODIUM 175 UG/1
175 TABLET ORAL DAILY
Qty: 90 TABLET | Refills: 3 | Status: SHIPPED | OUTPATIENT
Start: 2019-01-10 | End: 2020-01-14

## 2019-01-10 RX ORDER — TRAMADOL HYDROCHLORIDE 50 MG/1
50 TABLET ORAL EVERY 6 HOURS PRN
Qty: 90 TABLET | Refills: 5 | Status: SHIPPED | OUTPATIENT
Start: 2019-01-10 | End: 2019-07-03

## 2019-01-10 RX ORDER — METHYLPREDNISOLONE ACETATE 80 MG/ML
80 INJECTION, SUSPENSION INTRA-ARTICULAR; INTRALESIONAL; INTRAMUSCULAR; SOFT TISSUE ONCE
Status: DISCONTINUED | OUTPATIENT
Start: 2019-01-10 | End: 2019-01-31

## 2019-01-10 ASSESSMENT — PATIENT HEALTH QUESTIONNAIRE - PHQ9
SUM OF ALL RESPONSES TO PHQ QUESTIONS 1-9: 6
5. POOR APPETITE OR OVEREATING: SEVERAL DAYS

## 2019-01-10 ASSESSMENT — ENCOUNTER SYMPTOMS
NERVOUS/ANXIOUS: 1
UNEXPECTED WEIGHT CHANGE: 0
BACK PAIN: 1
COUGH: 0
FEVER: 0
DYSPHORIC MOOD: 1
SHORTNESS OF BREATH: 0
ARTHRALGIAS: 1
FATIGUE: 1

## 2019-01-10 ASSESSMENT — ANXIETY QUESTIONNAIRES
3. WORRYING TOO MUCH ABOUT DIFFERENT THINGS: MORE THAN HALF THE DAYS
IF YOU CHECKED OFF ANY PROBLEMS ON THIS QUESTIONNAIRE, HOW DIFFICULT HAVE THESE PROBLEMS MADE IT FOR YOU TO DO YOUR WORK, TAKE CARE OF THINGS AT HOME, OR GET ALONG WITH OTHER PEOPLE: VERY DIFFICULT
GAD7 TOTAL SCORE: 10
6. BECOMING EASILY ANNOYED OR IRRITABLE: MORE THAN HALF THE DAYS
5. BEING SO RESTLESS THAT IT IS HARD TO SIT STILL: NOT AT ALL
2. NOT BEING ABLE TO STOP OR CONTROL WORRYING: MORE THAN HALF THE DAYS
7. FEELING AFRAID AS IF SOMETHING AWFUL MIGHT HAPPEN: SEVERAL DAYS
1. FEELING NERVOUS, ANXIOUS, OR ON EDGE: MORE THAN HALF THE DAYS

## 2019-01-10 ASSESSMENT — PAIN SCALES - GENERAL: PAINLEVEL: MILD PAIN (3)

## 2019-01-10 NOTE — NURSING NOTE
"Coming in for injection in both knees, depression, thyroid    Chief Complaint   Patient presents with     Imm/Inj     knee, thyroid and Depression       Initial /70 (BP Location: Right arm, Patient Position: Sitting, Cuff Size: Adult Large)   Pulse 60   Resp 16   Wt 125.7 kg (277 lb 3.2 oz)   LMP 01/10/2019   Breastfeeding? No   BMI 46.13 kg/m   Estimated body mass index is 46.13 kg/m  as calculated from the following:    Height as of 1/2/19: 1.651 m (5' 5\").    Weight as of this encounter: 125.7 kg (277 lb 3.2 oz).  Medication Reconciliation: complete    Yadira Warner LPN  "

## 2019-01-10 NOTE — PROGRESS NOTES
SUBJECTIVE:   Laverne Nguyen is a 38 year old female who presents to clinic today for the following health issues:    HPI  Follow up on knee pains, depression and thyroid.      Injections help her knees for about 2 months or so.  Lots of pains in both for the last 2 weeks or so.  Wants to keep getting them.    Wants her TSH done.  Is always tired, low energy, feels cold all the time.  This is not worse and no better.  Last TSH was 1 year ago and was 4.87.  Winter is harder, anniversary of her partner's death was in Nov, and his birthday was [birth year].  She feels like the lexapro is not working great any longer for anxiety.  Helps for depression.  Feels down about her weight as well.      TERESA-7 SCORE 6/28/2018 10/9/2018 1/10/2019   Total Score 13 10 10             PHQ-9 SCORE 1/5/2018 10/9/2018 1/10/2019   PHQ-9 Total Score 6 5 6       Patient Active Problem List    Diagnosis Date Noted     Tobacco abuse 03/07/2018     Priority: Medium     Morbid obesity (H) 03/07/2018     Priority: Medium     Overview:   2007, BMI 51       Essential hypertension 03/07/2018     Priority: Medium     Primary insomnia 02/28/2018     Priority: Medium     Lumbar facet joint syndrome 01/05/2018     Priority: Medium     Major depressive disorder, recurrent, mild (H) 01/02/2015     Priority: Medium     Pain medication agreement 11/20/2014     Priority: Medium     Overview:   Updated 5/6/2016       Degenerative arthritis of knee 11/10/2014     Priority: Medium     Avulsion fracture of ankle, right, closed, initial encounter 08/04/2014     Priority: Medium     Patellofemoral pain syndrome of left knee 08/04/2014     Priority: Medium     Rectal bleeding 01/09/2014     Priority: Medium     Otosclerosis 05/16/2013     Priority: Medium     Anxiety state 03/29/2013     Priority: Medium     Overview:   IMO Update       History of gastric bypass 03/29/2013     Priority: Medium     Idiopathic angioedema 11/29/2012     Priority: Medium     Other  specified hypothyroidism 10/12/2010     Priority: Medium     B12 deficiency 09/15/2010     Priority: Medium     Overview:   secondary to gastric bypass       Past Surgical History:   Procedure Laterality Date     ADENOIDECTOMY      03/87     ANKLE SURGERY      8/14     COLONOSCOPY      1/10/2014,hyperplastic     LAPAROSCOPIC TUBAL LIGATION      2009     OTHER SURGICAL HISTORY      10/86,84323.0,DE CREATE EARDRUM OPENING GEN ANESTH     OTHER SURGICAL HISTORY      1980,44114.0,DE CREATE EARDRUM OPENING GEN ANESTH     OTHER SURGICAL HISTORY      1992,117308,OTHER, with left PE tube     OTHER SURGICAL HISTORY      01/94,274038,OTHER     OTHER SURGICAL HISTORY      05/22/07,057357,OTHER, for obesity, Dr. Hogue, [place]     Social History     Tobacco Use     Smoking status: Former Smoker     Packs/day: 0.25     Types: Cigarettes     Smokeless tobacco: Never Used   Substance Use Topics     Alcohol use: Yes     Comment: Alcoholic Drinks/day: occasionally 2/month     Current Outpatient Medications   Medication Sig Dispense Refill     busPIRone (BUSPAR) 10 MG tablet Take 1 tablet (10 mg) by mouth 3 times daily 90 tablet 11     cetirizine (ZYRTEC) 10 MG tablet Take 1 tablet (10 mg) by mouth daily 90 tablet 3     Cholecalciferol (VITAMIN D3) 2000 units CAPS Take 1 tablet by mouth daily 30 capsule 11     clonazePAM (KLONOPIN) 1 MG tablet TAKE 1 TABLET BY MOUTH AT BEDTIME 90 tablet 1     cyanocobalamin (VITAMIN B12) 1000 MCG/ML injection Inject 1 mL (1,000 mcg) into the muscle every 30 days 3 mL 3     EPINEPHrine (EPIPEN/ADRENACLICK/OR ANY BX GENERIC EQUIV) 0.3 MG/0.3ML injection 2-pack Inject 0.3 mLs (0.3 mg) into the muscle as needed for anaphylaxis 0.6 mL 0     escitalopram (LEXAPRO) 20 MG tablet TAKE 1 TABLET BY MOUTH EVERY DAY 90 tablet 3     levothyroxine (SYNTHROID/LEVOTHROID) 150 MCG tablet Take 1 tablet (150 mcg) by mouth daily 90 tablet 3     lisinopril-hydrochlorothiazide (PRINZIDE/ZESTORETIC) 20-12.5 MG per tablet  "Take 2 tablets by mouth daily 180 tablet 3     metroNIDAZOLE (METROGEL) 1 % gel Apply topically daily 60 g 3     Multiple Vitamin (MULTI-VITAMINS) TABS Take 1 tablet by mouth       order for DME Equipment being ordered: knee brace 1 each 0     syringe/needle, disp, (MEDSAVER SYRINGE) 25G X 1\" 3 ML MISC Inject 1 each into the muscle every 30 days 12 each 0     traMADol (ULTRAM) 50 MG tablet Take 1 tablet (50 mg) by mouth every 6 hours as needed for severe pain 90 tablet 5     traZODone (DESYREL) 100 MG tablet Take 1 tablet (100 mg) by mouth At Bedtime 90 tablet 3     Allergies   Allergen Reactions     Amoxicillin Other (See Comments)     Pt reports it is ineffective for her personally       Review of Systems   Constitutional: Positive for fatigue. Negative for fever and unexpected weight change.   Respiratory: Negative for cough and shortness of breath.    Cardiovascular: Negative for chest pain.   Musculoskeletal: Positive for arthralgias and back pain.   Psychiatric/Behavioral: Positive for dysphoric mood. The patient is nervous/anxious.         OBJECTIVE:     /70 (BP Location: Right arm, Patient Position: Sitting, Cuff Size: Adult Large)   Pulse 60   Resp 16   Wt 125.7 kg (277 lb 3.2 oz)   LMP 01/10/2019   Breastfeeding? No   BMI 46.13 kg/m    Body mass index is 46.13 kg/m .  Physical Exam   Constitutional: She appears well-developed.   Neck: No thyromegaly present.   Cardiovascular: Normal rate and regular rhythm. Exam reveals no friction rub.   No murmur heard.  Pulmonary/Chest: Effort normal and breath sounds normal. No stridor. No respiratory distress. She has no wheezes.   Musculoskeletal:   Knees are without tenderness nor erythema.  Discussed with her risks of injections.  Consent signed.  Prepped and both infiltrated.  80 milligram of depot medrol and 4 ml lidocaine in each knee.    Skin: Skin is warm and dry.   Psychiatric:   flat affect, tearful at times.       Diagnostic Test " Results:  Results for orders placed or performed in visit on 01/10/19   TSH   Result Value Ref Range    Thyrotropin 2.55 0.34 - 5.60 IU/mL       ASSESSMENT/PLAN:         (E03.8) Other specified hypothyroidism  (primary encounter diagnosis)  Comment: I did increase the dose for her.  She wants to try to get the TSH to 2 or so.  Some of her expectations are unrealistic and I tried to challenge some of her false beliefs (such as not being able to lose weight due to her knees and inability to exercise).  Strongly advised counseling, lots of shame and grief from the death of her BF 10 years ago.  Plan: TSH             (M17.0) Primary osteoarthritis of both knees  Comment: progressing  Plan: methylPREDNISolone (DEPO-MEDROL) injection 80         mg, methylPREDNISolone (DEPO-MEDROL) injection         80 mg        Has been told by surgery no TKA until age 52.  Again discussed with her weight loss as a treatment, very effective.    (F33.0) Major depressive disorder, recurrent, mild (H)  Comment: we talked about med changes.  She is on the fence about this.    Plan: can call me if she wants to change, would consider a different SSRO or effexor.    (E66.01) Morbid obesity (H)  Comment: see above.  Plan:        Alin Pimentel MD  [facility] AND [facility]

## 2019-01-11 ASSESSMENT — ANXIETY QUESTIONNAIRES: GAD7 TOTAL SCORE: 10

## 2019-01-15 ENCOUNTER — MYC MEDICAL ADVICE (OUTPATIENT)
Dept: FAMILY MEDICINE | Facility: OTHER | Age: 39
End: 2019-01-15

## 2019-01-22 ENCOUNTER — MYC MEDICAL ADVICE (OUTPATIENT)
Dept: FAMILY MEDICINE | Facility: OTHER | Age: 39
End: 2019-01-22

## 2019-01-22 DIAGNOSIS — F33.0 MAJOR DEPRESSIVE DISORDER, RECURRENT, MILD (H): Primary | ICD-10-CM

## 2019-01-22 RX ORDER — LORAZEPAM 1 MG/1
1 TABLET ORAL EVERY 6 HOURS PRN
Qty: 30 TABLET | Refills: 0 | Status: SHIPPED | OUTPATIENT
Start: 2019-01-22 | End: 2019-02-07

## 2019-01-22 RX ORDER — BUPROPION HYDROCHLORIDE 150 MG/1
150 TABLET ORAL EVERY MORNING
Qty: 90 TABLET | Refills: 3 | Status: SHIPPED | OUTPATIENT
Start: 2019-01-22 | End: 2020-01-17

## 2019-01-22 NOTE — TELEPHONE ENCOUNTER
Closing encounter. This was addressed in [facility] ([facility])hart message.    Diandra San LPN on 1/22/2019 at 12:39 PM

## 2019-01-22 NOTE — TELEPHONE ENCOUNTER
Contact pt, see what dose of buspirone she is actually taking, often it needs to start low and then titrated slowly (over months) up to 15 milligram twice daily to three times a day.  Usually start at 5 milligram twice daily or so.  Otherwise ask her what she has in mind for an alternative.  The other meds that work are serotonin specific reuptake inhibitor (prozac, zoloft) or tranquilizers like xanax, ativan, etc.  Alin Pimentel MD on 1/22/2019 at 7:56 AM

## 2019-01-31 ENCOUNTER — OFFICE VISIT (OUTPATIENT)
Dept: FAMILY MEDICINE | Facility: OTHER | Age: 39
End: 2019-01-31
Attending: NURSE PRACTITIONER
Payer: COMMERCIAL

## 2019-01-31 VITALS
TEMPERATURE: 98.9 F | SYSTOLIC BLOOD PRESSURE: 123 MMHG | HEART RATE: 83 BPM | OXYGEN SATURATION: 98 % | WEIGHT: 276.3 LBS | HEIGHT: 65 IN | DIASTOLIC BLOOD PRESSURE: 80 MMHG | BODY MASS INDEX: 46.03 KG/M2

## 2019-01-31 DIAGNOSIS — H57.89 IRRITATION OF RIGHT EYE: Primary | ICD-10-CM

## 2019-01-31 DIAGNOSIS — S05.91XA RIGHT EYE INJURY, INITIAL ENCOUNTER: ICD-10-CM

## 2019-01-31 PROCEDURE — 99213 OFFICE O/P EST LOW 20 MIN: CPT | Performed by: NURSE PRACTITIONER

## 2019-01-31 RX ORDER — ERYTHROMYCIN 5 MG/G
0.5 OINTMENT OPHTHALMIC 4 TIMES DAILY
Qty: 3.5 G | Refills: 0 | Status: SHIPPED | OUTPATIENT
Start: 2019-01-31 | End: 2019-01-31

## 2019-01-31 SDOH — HEALTH STABILITY: MENTAL HEALTH: HOW OFTEN DO YOU HAVE A DRINK CONTAINING ALCOHOL?: NEVER

## 2019-01-31 ASSESSMENT — PAIN SCALES - GENERAL: PAINLEVEL: SEVERE PAIN (6)

## 2019-01-31 ASSESSMENT — MIFFLIN-ST. JEOR: SCORE: 1946.66
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
## 2019-01-31 NOTE — PATIENT INSTRUCTIONS
Apply Erythromycin eye ointment to right eye 4 x day    Cool wash cloth to right eye     Follow up with eye doctor tomorrow as scheduled

## 2019-01-31 NOTE — NURSING NOTE
fluorscein strip ordered by BIA Fuentes  Medication administered per order in [employer]   Lot # [id number]  Exp. 2021/05  NDC 07120-272-37  Patient tolerated well.  tetracaine ordered by BIA Fuentes.  Medication administered per order in [employer]   Lot # [id number]  Exp. 2019/03  NDC 3410-3765-92  Patient tolerated well.  Sammie Hanson LPN..................1/31/2019  4:11 PM
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
## 2019-02-07 ENCOUNTER — OFFICE VISIT (OUTPATIENT)
Dept: FAMILY MEDICINE | Facility: OTHER | Age: 39
End: 2019-02-07
Attending: FAMILY MEDICINE
Payer: COMMERCIAL

## 2019-02-07 VITALS
DIASTOLIC BLOOD PRESSURE: 62 MMHG | BODY MASS INDEX: 45.77 KG/M2 | SYSTOLIC BLOOD PRESSURE: 120 MMHG | WEIGHT: 276.4 LBS | RESPIRATION RATE: 14 BRPM | HEART RATE: 60 BPM

## 2019-02-07 DIAGNOSIS — F33.0 MAJOR DEPRESSIVE DISORDER, RECURRENT, MILD (H): Primary | ICD-10-CM

## 2019-02-07 PROCEDURE — 99214 OFFICE O/P EST MOD 30 MIN: CPT | Performed by: FAMILY MEDICINE

## 2019-02-07 RX ORDER — LORAZEPAM 1 MG/1
1 TABLET ORAL EVERY 6 HOURS PRN
Qty: 30 TABLET | Refills: 0 | Status: SHIPPED | OUTPATIENT
Start: 2019-02-07 | End: 2019-04-23

## 2019-02-07 ASSESSMENT — PAIN SCALES - GENERAL: PAINLEVEL: NO PAIN (0)

## 2019-02-07 ASSESSMENT — ANXIETY QUESTIONNAIRES
3. WORRYING TOO MUCH ABOUT DIFFERENT THINGS: MORE THAN HALF THE DAYS
6. BECOMING EASILY ANNOYED OR IRRITABLE: MORE THAN HALF THE DAYS
5. BEING SO RESTLESS THAT IT IS HARD TO SIT STILL: NOT AT ALL
7. FEELING AFRAID AS IF SOMETHING AWFUL MIGHT HAPPEN: SEVERAL DAYS
2. NOT BEING ABLE TO STOP OR CONTROL WORRYING: MORE THAN HALF THE DAYS
GAD7 TOTAL SCORE: 11
IF YOU CHECKED OFF ANY PROBLEMS ON THIS QUESTIONNAIRE, HOW DIFFICULT HAVE THESE PROBLEMS MADE IT FOR YOU TO DO YOUR WORK, TAKE CARE OF THINGS AT HOME, OR GET ALONG WITH OTHER PEOPLE: VERY DIFFICULT
1. FEELING NERVOUS, ANXIOUS, OR ON EDGE: MORE THAN HALF THE DAYS

## 2019-02-07 ASSESSMENT — PATIENT HEALTH QUESTIONNAIRE - PHQ9
5. POOR APPETITE OR OVEREATING: MORE THAN HALF THE DAYS
SUM OF ALL RESPONSES TO PHQ QUESTIONS 1-9: 7

## 2019-02-07 NOTE — NURSING NOTE
"Coming in for a f/u on new medication    Chief Complaint   Patient presents with     Recheck Medication     ativan       Initial /62 (BP Location: Right arm, Patient Position: Sitting, Cuff Size: Adult Large)   Pulse 60   Resp 14   Wt 125.4 kg (276 lb 6.4 oz)   LMP 01/10/2019   BMI 45.77 kg/m   Estimated body mass index is 45.77 kg/m  as calculated from the following:    Height as of 1/31/19: 1.655 m (5' 5.16\").    Weight as of this encounter: 125.4 kg (276 lb 6.4 oz).  Medication Reconciliation: complete    Yadira Warner LPN  "
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
## 2019-02-08 ASSESSMENT — ANXIETY QUESTIONNAIRES: GAD7 TOTAL SCORE: 11

## 2019-03-12 ENCOUNTER — MYC MEDICAL ADVICE (OUTPATIENT)
Dept: FAMILY MEDICINE | Facility: OTHER | Age: 39
End: 2019-03-12

## 2019-04-23 ENCOUNTER — OFFICE VISIT (OUTPATIENT)
Dept: FAMILY MEDICINE | Facility: OTHER | Age: 39
End: 2019-04-23
Attending: FAMILY MEDICINE
Payer: COMMERCIAL

## 2019-04-23 VITALS
BODY MASS INDEX: 46.01 KG/M2 | WEIGHT: 277.8 LBS | DIASTOLIC BLOOD PRESSURE: 70 MMHG | SYSTOLIC BLOOD PRESSURE: 118 MMHG | HEART RATE: 62 BPM | RESPIRATION RATE: 16 BRPM | TEMPERATURE: 98.6 F

## 2019-04-23 DIAGNOSIS — M17.0 PRIMARY OSTEOARTHRITIS OF BOTH KNEES: Primary | ICD-10-CM

## 2019-04-23 DIAGNOSIS — F41.1 GAD (GENERALIZED ANXIETY DISORDER): ICD-10-CM

## 2019-04-23 DIAGNOSIS — F33.0 MAJOR DEPRESSIVE DISORDER, RECURRENT, MILD (H): ICD-10-CM

## 2019-04-23 PROCEDURE — 20610 DRAIN/INJ JOINT/BURSA W/O US: CPT | Mod: 50 | Performed by: FAMILY MEDICINE

## 2019-04-23 PROCEDURE — 99214 OFFICE O/P EST MOD 30 MIN: CPT | Mod: 25 | Performed by: FAMILY MEDICINE

## 2019-04-23 PROCEDURE — 25000125 ZZHC RX 250: Performed by: FAMILY MEDICINE

## 2019-04-23 RX ORDER — METHYLPREDNISOLONE ACETATE 80 MG/ML
80 INJECTION, SUSPENSION INTRA-ARTICULAR; INTRALESIONAL; INTRAMUSCULAR; SOFT TISSUE ONCE
Status: COMPLETED | OUTPATIENT
Start: 2019-04-23 | End: 2019-04-23

## 2019-04-23 RX ORDER — FLUOXETINE 40 MG/1
40 CAPSULE ORAL DAILY
Qty: 90 CAPSULE | Refills: 3 | Status: SHIPPED | OUTPATIENT
Start: 2019-04-23 | End: 2020-04-21

## 2019-04-23 RX ORDER — LORAZEPAM 1 MG/1
1 TABLET ORAL EVERY 6 HOURS PRN
Qty: 30 TABLET | Refills: 3 | Status: SHIPPED | OUTPATIENT
Start: 2019-04-23 | End: 2019-11-03

## 2019-04-23 RX ADMIN — METHYLPREDNISOLONE ACETATE 80 MG: 80 INJECTION, SUSPENSION INTRA-ARTICULAR; INTRALESIONAL; INTRAMUSCULAR; SOFT TISSUE at 14:59

## 2019-04-23 RX ADMIN — METHYLPREDNISOLONE ACETATE 80 MG: 80 INJECTION, SUSPENSION INTRA-ARTICULAR; INTRALESIONAL; INTRAMUSCULAR; SOFT TISSUE at 14:57

## 2019-04-23 ASSESSMENT — PATIENT HEALTH QUESTIONNAIRE - PHQ9
5. POOR APPETITE OR OVEREATING: SEVERAL DAYS
SUM OF ALL RESPONSES TO PHQ QUESTIONS 1-9: 4

## 2019-04-23 ASSESSMENT — ANXIETY QUESTIONNAIRES
GAD7 TOTAL SCORE: 8
5. BEING SO RESTLESS THAT IT IS HARD TO SIT STILL: NOT AT ALL
2. NOT BEING ABLE TO STOP OR CONTROL WORRYING: MORE THAN HALF THE DAYS
3. WORRYING TOO MUCH ABOUT DIFFERENT THINGS: MORE THAN HALF THE DAYS
7. FEELING AFRAID AS IF SOMETHING AWFUL MIGHT HAPPEN: SEVERAL DAYS
1. FEELING NERVOUS, ANXIOUS, OR ON EDGE: SEVERAL DAYS
IF YOU CHECKED OFF ANY PROBLEMS ON THIS QUESTIONNAIRE, HOW DIFFICULT HAVE THESE PROBLEMS MADE IT FOR YOU TO DO YOUR WORK, TAKE CARE OF THINGS AT HOME, OR GET ALONG WITH OTHER PEOPLE: SOMEWHAT DIFFICULT
6. BECOMING EASILY ANNOYED OR IRRITABLE: SEVERAL DAYS

## 2019-04-23 ASSESSMENT — ENCOUNTER SYMPTOMS
NERVOUS/ANXIOUS: 1
FATIGUE: 0
ADENOPATHY: 0
BACK PAIN: 1
DYSPHORIC MOOD: 1
BRUISES/BLEEDS EASILY: 0
ARTHRALGIAS: 1
FEVER: 0

## 2019-04-23 ASSESSMENT — PAIN SCALES - GENERAL: PAINLEVEL: SEVERE PAIN (6)

## 2019-04-23 NOTE — NURSING NOTE
"Coming in for a medication check up and injections in both knees    Chief Complaint   Patient presents with     Recheck Medication     check up, injection for both knee, chicken fat       Initial /70 (BP Location: Right arm, Patient Position: Sitting, Cuff Size: Adult Large)   Pulse 62   Temp 98.6  F (37  C) (Tympanic)   Resp 16   Wt 126 kg (277 lb 12.8 oz)   Breastfeeding? No   BMI 46.01 kg/m   Estimated body mass index is 46.01 kg/m  as calculated from the following:    Height as of 1/31/19: 1.655 m (5' 5.16\").    Weight as of this encounter: 126 kg (277 lb 12.8 oz).  Medication Reconciliation: complete    Yadira Wanrer LPN  "

## 2019-04-23 NOTE — PROGRESS NOTES
SUBJECTIVE:   Laverne Nguyen is a 39 year old female who presents to clinic today for the following health issues:    HPI  Med follow up and knee injections.  She wants to try sinvisc.  Steroids are only lasting about 6 weeks or so.  Has stairs at work, is nearly full time and doing the stairs cause more pain into low back and her hips.  Has a hard time getting up and down from sitting.   Pain gets worse when she straightens them from a flexed position.  Tramadol helps, using about 4-6 daily typically.    She has ativan as well, just has 4 left, has not used this at all in the last 2 months.  Daughter has been sick, at the [facility].  Losing weight.  Now is going to go to [place] next.  Pt feels she gets more anxious and depressed int he winter as well, mood picking up now with more sunlight.  She is not seeing a counselor currently, simply does not want to leave her house.            Right knee MRI in 2015:    IMPRESSION:     1. Diffuse full-thickness chondromalacia in the patellofemoral compartment with adjacent subchondral bone marrow edema.  2. The patella is laterally positioned with respect to the trochlea. Correlate for signs or symptoms of transient patellar subluxation.  3. Bone marrow edema in the medial femoral condyle medially and posteriorly, may represent a bone contusion.        Electronically Signed By: Valorie Nelson M.D. on 10/26/2015 3:57 PM    left knee 2014:    IMPRESSION:     1. Chondromalacia patella with a number of chronic appearing partial and  full thickness articular cartilage defects of the patellar apex and  lateral facets. These changes are seen in combination with a diffuse  thinning articular cartilage in these locations, and patchy areas of  subchondral edema.  2. Approximately 7.6 x 5.8 mm chronic appearing osteochondral  fracture/osteochondral lesion involving the anterior margins of the medial  femoral condyle within the femoral notch.  3. Moderate sized joint effusion with  synovitis and particulate  debris/joint bodies.  4. Leaking Baker's cyst.  5. Type II-III degenerative arthrosis involving the weight-bearing  articular cartilage of the lateral tibial plateau.  6. Marrow edema seen within the proximal, posterior aspects of the tibia  are subjacent to the tibial attachment to the posterior cruciate ligament,  and may be related to residual changes of subacute injury or perhaps  related to repetitive injury. A well defined posterior cruciate ligament  tear is not seen. Ultimately, these could be also related to chronic  degenerative change.     Kt Arechiga M.D.    Patient Active Problem List    Diagnosis Date Noted     Tobacco abuse 03/07/2018     Priority: Medium     Morbid obesity (H) 03/07/2018     Priority: Medium     Overview:   2007, BMI 51       Essential hypertension 03/07/2018     Priority: Medium     Primary insomnia 02/28/2018     Priority: Medium     Lumbar facet joint syndrome 01/05/2018     Priority: Medium     Major depressive disorder, recurrent, mild (H) 01/02/2015     Priority: Medium     Pain medication agreement 11/20/2014     Priority: Medium     Overview:   Updated 5/6/2016       Degenerative arthritis of knee 11/10/2014     Priority: Medium     Avulsion fracture of ankle, right, closed, initial encounter 08/04/2014     Priority: Medium     Patellofemoral pain syndrome of left knee 08/04/2014     Priority: Medium     Rectal bleeding 01/09/2014     Priority: Medium     Otosclerosis 05/16/2013     Priority: Medium     Anxiety state 03/29/2013     Priority: Medium     Overview:   IMO Update       History of gastric bypass 03/29/2013     Priority: Medium     Idiopathic angioedema 11/29/2012     Priority: Medium     Other specified hypothyroidism 10/12/2010     Priority: Medium     B12 deficiency 09/15/2010     Priority: Medium     Overview:   secondary to gastric bypass       Past Surgical History:   Procedure Laterality Date     ADENOIDECTOMY      03/87      ANKLE SURGERY      8/14     COLONOSCOPY      1/10/2014,hyperplastic     LAPAROSCOPIC TUBAL LIGATION      2009     OTHER SURGICAL HISTORY      10/86,37510.0,IA CREATE EARDRUM OPENING GEN ANESTH     OTHER SURGICAL HISTORY      1980,40505.0,IA CREATE EARDRUM OPENING GEN ANESTH     OTHER SURGICAL HISTORY      1992,902654,OTHER, with left PE tube     OTHER SURGICAL HISTORY      01/94,015381,OTHER     OTHER SURGICAL HISTORY      05/22/07,799870,OTHER, for obesity, Dr. Hogue, [place]     Social History     Tobacco Use     Smoking status: Former Smoker     Packs/day: 0.25     Types: Cigarettes     Smokeless tobacco: Never Used   Substance Use Topics     Alcohol use: No     Frequency: Never     Current Outpatient Medications   Medication Sig Dispense Refill     buPROPion (WELLBUTRIN XL) 150 MG 24 hr tablet Take 1 tablet (150 mg) by mouth every morning 90 tablet 3     cetirizine (ZYRTEC) 10 MG tablet Take 1 tablet (10 mg) by mouth daily 90 tablet 3     Cholecalciferol (VITAMIN D3) 2000 units CAPS Take 1 tablet by mouth daily 30 capsule 11     cyanocobalamin (VITAMIN B12) 1000 MCG/ML injection Inject 1 mL (1,000 mcg) into the muscle every 30 days 3 mL 3     EPINEPHrine (EPIPEN/ADRENACLICK/OR ANY BX GENERIC EQUIV) 0.3 MG/0.3ML injection 2-pack Inject 0.3 mLs (0.3 mg) into the muscle as needed for anaphylaxis 0.6 mL 0     FLUoxetine (PROZAC) 20 MG capsule Take 1 capsule (20 mg) by mouth daily 90 capsule 3     gentamicin (GARAMYCIN) 0.3 % ophthalmic ointment Place 0.5 inches into the right eye 3 times daily 3.5 g 0     levothyroxine (SYNTHROID/LEVOTHROID) 175 MCG tablet Take 1 tablet (175 mcg) by mouth daily 90 tablet 3     lisinopril-hydrochlorothiazide (PRINZIDE/ZESTORETIC) 20-12.5 MG per tablet Take 2 tablets by mouth daily 180 tablet 3     LORazepam (ATIVAN) 1 MG tablet Take 1 tablet (1 mg) by mouth every 6 hours as needed for anxiety 30 tablet 0     metroNIDAZOLE (METROGEL) 1 % gel Apply topically daily 60 g 3      "Multiple Vitamin (MULTI-VITAMINS) TABS Take 1 tablet by mouth       order for DME Equipment being ordered: knee brace 1 each 0     syringe/needle, disp, (MEDSAVER SYRINGE) 25G X 1\" 3 ML MISC Inject 1 each into the muscle every 30 days 12 each 0     traMADol (ULTRAM) 50 MG tablet Take 1 tablet (50 mg) by mouth every 6 hours as needed for severe pain 90 tablet 5     traZODone (DESYREL) 100 MG tablet Take 2 tablets (200 mg) by mouth At Bedtime 180 tablet 3     Allergies   Allergen Reactions     Amoxicillin Other (See Comments)     Pt reports it is ineffective for her personally       Review of Systems   Constitutional: Negative for fatigue and fever.   Musculoskeletal: Positive for arthralgias and back pain.   Hematological: Negative for adenopathy. Does not bruise/bleed easily.   Psychiatric/Behavioral: Positive for dysphoric mood. Negative for self-injury. The patient is nervous/anxious.         OBJECTIVE:     /70 (BP Location: Right arm, Patient Position: Sitting, Cuff Size: Adult Large)   Pulse 62   Temp 98.6  F (37  C) (Tympanic)   Resp 16   Wt 126 kg (277 lb 12.8 oz)   Breastfeeding? No   BMI 46.01 kg/m    Body mass index is 46.01 kg/m .  Physical Exam   Constitutional: She is oriented to person, place, and time. She appears well-developed and well-nourished. No distress.   Musculoskeletal:   Bilateral knees with near full range of motion. Crepitus in left knee over patella.  Left side has mild pain along medial joint line.  Mild genu valgus as well.   Neurological: She is alert and oriented to person, place, and time.   Skin: Skin is warm and dry. She is not diaphoretic.   Psychiatric: She has a normal mood and affect. Her behavior is normal. Thought content normal.     TERESA-7 SCORE 1/10/2019 2/7/2019 4/23/2019   Total Score 10 11 8         Diagnostic Test Results:  none     ASSESSMENT/PLAN:         (M17.0) Primary osteoarthritis of both knees  (primary encounter diagnosis)  Comment: given that at " least a large part of her pain is patellofemoral in nature, I do not feel that Synvisc would be applicable.  If she wants it, I would want to get another ortho consult first.  She instead wants to simply get another steroid injection today.    Consent signed.  Both knees prepped with chloraprep and infiltrated with 4 ml 1% lidocaine and 80 milligram depotmedrol in every knee.  Tolerated well.    Plan: I would prefer she meet with ortho.  We also talked about weight loss, again she feels she has gained weight because she cannot move.  This is not true, and would need to change her diet.        30 minutes with her, over 1/2 in coordination of cares.      (F33.0) Major depressive disorder, recurrent, mild (H)  Comment: improving slightly with the weather changes  Plan: LORazepam (ATIVAN) 1 MG tablet             (F41.1) TERESA (generalized anxiety disorder)  Comment: refilled ativan  Plan:  Increased prozac to 40 milligram daily.  Follow up in 4-5 weeks on this.            Alin Pimentel MD  [facility] AND \[facility]\""

## 2019-04-24 ASSESSMENT — ANXIETY QUESTIONNAIRES: GAD7 TOTAL SCORE: 8

## 2019-06-03 DIAGNOSIS — I10 ESSENTIAL HYPERTENSION: ICD-10-CM

## 2019-06-04 RX ORDER — LISINOPRIL AND HYDROCHLOROTHIAZIDE 12.5; 2 MG/1; MG/1
2 TABLET ORAL DAILY
Qty: 180 TABLET | Refills: 3 | Status: SHIPPED | OUTPATIENT
Start: 2019-06-04 | End: 2020-03-11

## 2019-06-04 NOTE — TELEPHONE ENCOUNTER
[facility] sent Rx request for the following:      LISINOPRIL-HCTZ 20/12.5MG TABLETS  Sig: Take 2 tablets by mouth daily  Last Prescription Date:   6/28/18  Last Fill Qty/Refills:         180, R-3    Last Office Visit:              4/23/18  Future Office visit:           None.  Routing refill request to provider for review/approval because:  Diuretics (Including Combos) Protocol Failed6/4 2:47 PM   Normal serum creatinine on file in past 12 months    Normal serum potassium on file in past 12 months    Normal serum sodium on file in past 12 months     Per LOV Note, Pt due for follow up today (6/4). No upcoming appointment noted.    Called and spoke to Patient after verifying last name and date of birth. Pt reminded of the above information. She verbalized agreement to call back tomorrow, to schedule follow-up appointment.     Unable to complete prescription refill per RN Medication Refill Policy. Valorie Castorena RN .............. 6/4/2019  2:56 PM

## 2019-07-03 ENCOUNTER — MYC MEDICAL ADVICE (OUTPATIENT)
Dept: FAMILY MEDICINE | Facility: OTHER | Age: 39
End: 2019-07-03

## 2019-07-03 DIAGNOSIS — S33.5XXA LUMBAR SPRAIN, INITIAL ENCOUNTER: ICD-10-CM

## 2019-07-03 RX ORDER — TRAMADOL HYDROCHLORIDE 50 MG/1
50 TABLET ORAL EVERY 6 HOURS PRN
Qty: 90 TABLET | Refills: 5 | Status: SHIPPED | OUTPATIENT
Start: 2019-07-03 | End: 2020-01-22

## 2019-07-10 DIAGNOSIS — S33.5XXA LUMBAR SPRAIN, INITIAL ENCOUNTER: ICD-10-CM

## 2019-07-16 NOTE — TELEPHONE ENCOUNTER
[facility] sent Rx request for the following:      TRAMADOL 50MG TABLETS  Sig: TAKE 1 TABLET BY MOUTH EVERY 6 HOURS AS NEEDED FOR SEVERE PAIN    Last Prescription:  traMADol (ULTRAM) 50 MG tablet 90 tablet 5 7/3/2019  No   Sig - Route: Take 1 tablet (50 mg) by mouth every 6 hours as needed for severe pain - Oral   Class: Local Print     07/03/19 1230 Sign Alin Pimentel MD Reorder from Order: [id number]     Drug not on the [facility] [place] or [facility] refill protocol or controlled substance    Attempted reaching [facility] to confirm receipt of the above prescription. Unable to get through at this time. Writer will try back tomorrow. Valorie Castorena RN .............. 7/16/2019  4:56 PM

## 2019-07-17 RX ORDER — TRAMADOL HYDROCHLORIDE 50 MG/1
TABLET ORAL
Qty: 90 TABLET | Refills: 0 | OUTPATIENT
Start: 2019-07-17

## 2019-07-17 NOTE — TELEPHONE ENCOUNTER
Attempted reaching [facility]. Message states they are experiencing difficulty at this time, and need to call back at a different time.    Writer was able to locate original hard-script.    Called and spoke to Patient after verifying last name and date of birth. Pt verified that she was just into [facility], and they noted prescription was never received. Pt confirmed preferred pharmacy as [facility] #[id number].    Prescription re-faxed to [facility]. Refill request refused, with note to pharmacy. Unable to complete prescription refill per RN Medication Refill Policy. Valorie Castorena RN .............. 7/17/2019  12:09 PM

## 2019-07-30 ENCOUNTER — OFFICE VISIT (OUTPATIENT)
Dept: FAMILY MEDICINE | Facility: OTHER | Age: 39
End: 2019-07-30
Attending: FAMILY MEDICINE
Payer: COMMERCIAL

## 2019-07-30 VITALS
BODY MASS INDEX: 45.31 KG/M2 | WEIGHT: 273.6 LBS | OXYGEN SATURATION: 98 % | HEART RATE: 96 BPM | DIASTOLIC BLOOD PRESSURE: 70 MMHG | SYSTOLIC BLOOD PRESSURE: 120 MMHG | TEMPERATURE: 98.1 F | RESPIRATION RATE: 14 BRPM

## 2019-07-30 DIAGNOSIS — E16.2 HYPOGLYCEMIA: Primary | ICD-10-CM

## 2019-07-30 DIAGNOSIS — Z98.84 HISTORY OF GASTRIC BYPASS: ICD-10-CM

## 2019-07-30 DIAGNOSIS — F33.0 MAJOR DEPRESSIVE DISORDER, RECURRENT, MILD (H): ICD-10-CM

## 2019-07-30 DIAGNOSIS — M22.2X2 PATELLOFEMORAL PAIN SYNDROME OF LEFT KNEE: ICD-10-CM

## 2019-07-30 DIAGNOSIS — M25.561 PATELLOFEMORAL ARTHRALGIA OF RIGHT KNEE: ICD-10-CM

## 2019-07-30 DIAGNOSIS — E53.8 B12 DEFICIENCY: ICD-10-CM

## 2019-07-30 LAB
ALBUMIN SERPL-MCNC: 4 G/DL (ref 3.5–5.7)
ALP SERPL-CCNC: 105 U/L (ref 34–104)
ALT SERPL W P-5'-P-CCNC: 20 U/L (ref 7–52)
ANION GAP SERPL CALCULATED.3IONS-SCNC: 6 MMOL/L (ref 3–14)
AST SERPL W P-5'-P-CCNC: 21 U/L (ref 13–39)
BASOPHILS # BLD AUTO: 0.1 10E9/L (ref 0–0.2)
BASOPHILS NFR BLD AUTO: 0.7 %
BILIRUB SERPL-MCNC: 0.5 MG/DL (ref 0.3–1)
BUN SERPL-MCNC: 7 MG/DL (ref 7–25)
CALCIUM SERPL-MCNC: 8.7 MG/DL (ref 8.6–10.3)
CHLORIDE SERPL-SCNC: 102 MMOL/L (ref 98–107)
CO2 SERPL-SCNC: 26 MMOL/L (ref 21–31)
CREAT SERPL-MCNC: 0.61 MG/DL (ref 0.6–1.2)
DIFFERENTIAL METHOD BLD: NORMAL
EOSINOPHIL # BLD AUTO: 0.1 10E9/L (ref 0–0.7)
EOSINOPHIL NFR BLD AUTO: 1.6 %
ERYTHROCYTE [DISTWIDTH] IN BLOOD BY AUTOMATED COUNT: 12.3 % (ref 10–15)
GFR SERPL CREATININE-BSD FRML MDRD: >90 ML/MIN/{1.73_M2}
GLUCOSE SERPL-MCNC: 104 MG/DL (ref 70–105)
HCT VFR BLD AUTO: 38.2 % (ref 35–47)
HGB BLD-MCNC: 13 G/DL (ref 11.7–15.7)
IMM GRANULOCYTES # BLD: 0 10E9/L (ref 0–0.4)
IMM GRANULOCYTES NFR BLD: 0.3 %
LYMPHOCYTES # BLD AUTO: 1.4 10E9/L (ref 0.8–5.3)
LYMPHOCYTES NFR BLD AUTO: 20.9 %
MCH RBC QN AUTO: 31.9 PG (ref 26.5–33)
MCHC RBC AUTO-ENTMCNC: 34 G/DL (ref 31.5–36.5)
MCV RBC AUTO: 94 FL (ref 78–100)
MONOCYTES # BLD AUTO: 0.6 10E9/L (ref 0–1.3)
MONOCYTES NFR BLD AUTO: 8.3 %
NEUTROPHILS # BLD AUTO: 4.7 10E9/L (ref 1.6–8.3)
NEUTROPHILS NFR BLD AUTO: 68.2 %
PLATELET # BLD AUTO: 306 10E9/L (ref 150–450)
POTASSIUM SERPL-SCNC: 4.2 MMOL/L (ref 3.5–5.1)
PROT SERPL-MCNC: 6.6 G/DL (ref 6.4–8.9)
RBC # BLD AUTO: 4.08 10E12/L (ref 3.8–5.2)
SODIUM SERPL-SCNC: 134 MMOL/L (ref 134–144)
VIT B12 SERPL-MCNC: 260 PG/ML (ref 180–914)
WBC # BLD AUTO: 6.8 10E9/L (ref 4–11)

## 2019-07-30 PROCEDURE — 20610 DRAIN/INJ JOINT/BURSA W/O US: CPT | Mod: 50 | Performed by: FAMILY MEDICINE

## 2019-07-30 PROCEDURE — 82607 VITAMIN B-12: CPT | Mod: ZL | Performed by: FAMILY MEDICINE

## 2019-07-30 PROCEDURE — 99214 OFFICE O/P EST MOD 30 MIN: CPT | Mod: 25 | Performed by: FAMILY MEDICINE

## 2019-07-30 PROCEDURE — 80053 COMPREHEN METABOLIC PANEL: CPT | Mod: ZL | Performed by: FAMILY MEDICINE

## 2019-07-30 PROCEDURE — 36415 COLL VENOUS BLD VENIPUNCTURE: CPT | Mod: ZL | Performed by: FAMILY MEDICINE

## 2019-07-30 PROCEDURE — 85025 COMPLETE CBC W/AUTO DIFF WBC: CPT | Mod: ZL | Performed by: FAMILY MEDICINE

## 2019-07-30 PROCEDURE — 25000128 H RX IP 250 OP 636: Performed by: FAMILY MEDICINE

## 2019-07-30 RX ORDER — METHYLPREDNISOLONE ACETATE 80 MG/ML
80 INJECTION, SUSPENSION INTRA-ARTICULAR; INTRALESIONAL; INTRAMUSCULAR; SOFT TISSUE ONCE
Status: COMPLETED | OUTPATIENT
Start: 2019-07-30 | End: 2019-07-30

## 2019-07-30 RX ORDER — CYANOCOBALAMIN 1000 UG/ML
1 INJECTION, SOLUTION INTRAMUSCULAR; SUBCUTANEOUS
Qty: 3 ML | Refills: 3 | Status: SHIPPED | OUTPATIENT
Start: 2019-07-30 | End: 2020-07-27

## 2019-07-30 RX ADMIN — METHYLPREDNISOLONE ACETATE 80 MG: 80 INJECTION, SUSPENSION INTRA-ARTICULAR; INTRALESIONAL; INTRAMUSCULAR; SOFT TISSUE at 12:30

## 2019-07-30 RX ADMIN — METHYLPREDNISOLONE ACETATE 80 MG: 80 INJECTION, SUSPENSION INTRA-ARTICULAR; INTRALESIONAL; INTRAMUSCULAR; SOFT TISSUE at 12:32

## 2019-07-30 ASSESSMENT — ANXIETY QUESTIONNAIRES
1. FEELING NERVOUS, ANXIOUS, OR ON EDGE: SEVERAL DAYS
GAD7 TOTAL SCORE: 3
6. BECOMING EASILY ANNOYED OR IRRITABLE: NOT AT ALL
3. WORRYING TOO MUCH ABOUT DIFFERENT THINGS: SEVERAL DAYS
7. FEELING AFRAID AS IF SOMETHING AWFUL MIGHT HAPPEN: NOT AT ALL
IF YOU CHECKED OFF ANY PROBLEMS ON THIS QUESTIONNAIRE, HOW DIFFICULT HAVE THESE PROBLEMS MADE IT FOR YOU TO DO YOUR WORK, TAKE CARE OF THINGS AT HOME, OR GET ALONG WITH OTHER PEOPLE: SOMEWHAT DIFFICULT
5. BEING SO RESTLESS THAT IT IS HARD TO SIT STILL: NOT AT ALL
2. NOT BEING ABLE TO STOP OR CONTROL WORRYING: SEVERAL DAYS

## 2019-07-30 ASSESSMENT — PAIN SCALES - GENERAL: PAINLEVEL: NO PAIN (0)

## 2019-07-30 ASSESSMENT — ENCOUNTER SYMPTOMS
FATIGUE: 0
ARTHRALGIAS: 1
DYSPHORIC MOOD: 0
HALLUCINATIONS: 0
TREMORS: 1
FEVER: 0
SLEEP DISTURBANCE: 0

## 2019-07-30 ASSESSMENT — PATIENT HEALTH QUESTIONNAIRE - PHQ9
5. POOR APPETITE OR OVEREATING: NOT AT ALL
SUM OF ALL RESPONSES TO PHQ QUESTIONS 1-9: 4

## 2019-07-30 NOTE — PROGRESS NOTES
SUBJECTIVE:   Laverne Nguyen is a 39 year old female who presents to clinic today for the following health issues:    HPI  Follow up on prozac and knee pain.  She notes she is getting even more tremors.  Happens at different times of the day, will check her sugar and sometimes is low with the tremors, into 60's.  Other times she is normal.  Gaps in concentration and memory.  Came in last week for today's appointment.  recently drove towards [place] when she intended to go to [place].  More stress in her life.  Court dates with her daughter.  Other daughter has been to [place], with esophagitis and fructose malabsorption.  Pt has had low sugars as a complication of her gastric bypass.  Feels the increased prozac has helped.  Less ativan use.  Perhaps once a week or so now.  She continues to lose weight.  Intentionally.  Has not had a B12 injection for a few months.  Simply forgets.  Wants a refill on this.    PHQ-9 SCORE 2/7/2019 4/23/2019 7/30/2019   PHQ-9 Total Score 7 4 4       Patient Active Problem List    Diagnosis Date Noted     Tobacco abuse 03/07/2018     Priority: Medium     Morbid obesity (H) 03/07/2018     Priority: Medium     Overview:   2007, BMI 51       Essential hypertension 03/07/2018     Priority: Medium     Primary insomnia 02/28/2018     Priority: Medium     Lumbar facet joint syndrome 01/05/2018     Priority: Medium     Major depressive disorder, recurrent, mild (H) 01/02/2015     Priority: Medium     Pain medication agreement 11/20/2014     Priority: Medium     Overview:   Updated 5/6/2016       Degenerative arthritis of knee 11/10/2014     Priority: Medium     Avulsion fracture of ankle, right, closed, initial encounter 08/04/2014     Priority: Medium     Patellofemoral pain syndrome of left knee 08/04/2014     Priority: Medium     Rectal bleeding 01/09/2014     Priority: Medium     Otosclerosis 05/16/2013     Priority: Medium     Anxiety state 03/29/2013     Priority: Medium     Overview:    IMO Update       History of gastric bypass 03/29/2013     Priority: Medium     Idiopathic angioedema 11/29/2012     Priority: Medium     Other specified hypothyroidism 10/12/2010     Priority: Medium     B12 deficiency 09/15/2010     Priority: Medium     Overview:   secondary to gastric bypass       Past Surgical History:   Procedure Laterality Date     ADENOIDECTOMY      03/87     ANKLE SURGERY      8/14     COLONOSCOPY  01/09/2014    hyperplastic, 10 year follow up     LAPAROSCOPIC TUBAL LIGATION      2009     OTHER SURGICAL HISTORY      10/86,40644.0,OR CREATE EARDRUM OPENING GEN ANESTH     OTHER SURGICAL HISTORY      1980,80842.0,OR CREATE EARDRUM OPENING GEN ANESTH     OTHER SURGICAL HISTORY      1992,503956,OTHER, with left PE tube     OTHER SURGICAL HISTORY      01/94,591151,OTHER     OTHER SURGICAL HISTORY      05/22/07,880762,OTHER, for obesity, Dr. Hogue, [place]     Social History     Tobacco Use     Smoking status: Former Smoker     Packs/day: 0.25     Types: Cigarettes     Smokeless tobacco: Never Used   Substance Use Topics     Alcohol use: No     Frequency: Never     Current Outpatient Medications   Medication Sig Dispense Refill     buPROPion (WELLBUTRIN XL) 150 MG 24 hr tablet Take 1 tablet (150 mg) by mouth every morning 90 tablet 3     cetirizine (ZYRTEC) 10 MG tablet Take 1 tablet (10 mg) by mouth daily (Patient taking differently: Take 10 mg by mouth as needed ) 90 tablet 3     Cholecalciferol (VITAMIN D3) 2000 units CAPS Take 1 tablet by mouth daily 30 capsule 11     cyanocobalamin (CYANOCOBALAMIN) 1000 MCG/ML injection Inject 1 mL (1,000 mcg) into the muscle every 30 days 3 mL 3     EPINEPHrine (EPIPEN/ADRENACLICK/OR ANY BX GENERIC EQUIV) 0.3 MG/0.3ML injection 2-pack Inject 0.3 mLs (0.3 mg) into the muscle as needed for anaphylaxis 0.6 mL 0     FLUoxetine (PROZAC) 40 MG capsule Take 1 capsule (40 mg) by mouth daily 90 capsule 3     levothyroxine (SYNTHROID/LEVOTHROID) 175 MCG tablet  "Take 1 tablet (175 mcg) by mouth daily 90 tablet 3     lisinopril-hydrochlorothiazide (PRINZIDE/ZESTORETIC) 20-12.5 MG tablet TAKE 2 TABLETS BY MOUTH DAILY 180 tablet 3     LORazepam (ATIVAN) 1 MG tablet Take 1 tablet (1 mg) by mouth every 6 hours as needed for anxiety 30 tablet 3     metroNIDAZOLE (METROGEL) 1 % gel Apply topically daily (Patient taking differently: Apply topically as needed ) 60 g 3     Multiple Vitamin (MULTI-VITAMINS) TABS Take 1 tablet by mouth       order for DME Equipment being ordered: knee brace 1 each 0     syringe/needle, disp, (MEDSAVER SYRINGE) 25G X 1\" 3 ML MISC Inject 1 each into the muscle every 30 days 12 each 0     traMADol (ULTRAM) 50 MG tablet Take 1 tablet (50 mg) by mouth every 6 hours as needed for severe pain 90 tablet 5     traZODone (DESYREL) 100 MG tablet Take 2 tablets (200 mg) by mouth At Bedtime 180 tablet 3     Allergies   Allergen Reactions     Amoxicillin Other (See Comments)     Pt reports it is ineffective for her personally       Review of Systems   Constitutional: Negative for fatigue and fever.   Cardiovascular: Negative for peripheral edema.   Musculoskeletal: Positive for arthralgias.   Neurological: Positive for tremors.   Psychiatric/Behavioral: Negative for dysphoric mood, hallucinations and sleep disturbance.        OBJECTIVE:     /70 (BP Location: Right arm, Patient Position: Sitting, Cuff Size: Adult Large)   Pulse 96   Temp 98.1  F (36.7  C) (Temporal)   Resp 14   Wt 124.1 kg (273 lb 9.6 oz)   SpO2 98%   Breastfeeding? No   BMI 45.31 kg/m    Body mass index is 45.31 kg/m .  Physical Exam   Constitutional: She is oriented to person, place, and time. She appears well-developed. No distress.   Cardiovascular: Normal rate, regular rhythm and normal heart sounds. Exam reveals no friction rub.   No murmur heard.  Pulmonary/Chest: Effort normal. No stridor. No respiratory distress. She has no wheezes. She has no rales.   Musculoskeletal: "   Bilateral knees without erythema or effusions.  Consent signed.  Both prepped and infiltrated each with 4 ml 1% lidocaine and 80 milligram depot medrol.  Tolerated well.   Neurological: She is alert and oriented to person, place, and time. No cranial nerve deficit.   Skin: Skin is warm and dry. She is not diaphoretic.   Psychiatric: She has a normal mood and affect. Her behavior is normal. Thought content normal.       Diagnostic Test Results:  Results for orders placed or performed in visit on 07/30/19   Vitamin B12   Result Value Ref Range    Vitamin B12 260 180 - 914 pg/mL   CBC and Differential   Result Value Ref Range    WBC 6.8 4.0 - 11.0 10e9/L    RBC Count 4.08 3.8 - 5.2 10e12/L    Hemoglobin 13.0 11.7 - 15.7 g/dL    Hematocrit 38.2 35.0 - 47.0 %    MCV 94 78 - 100 fl    MCH 31.9 26.5 - 33.0 pg    MCHC 34.0 31.5 - 36.5 g/dL    RDW 12.3 10.0 - 15.0 %    Platelet Count 306 150 - 450 10e9/L    Diff Method Automated Method     % Neutrophils 68.2 %    % Lymphocytes 20.9 %    % Monocytes 8.3 %    % Eosinophils 1.6 %    % Basophils 0.7 %    % Immature Granulocytes 0.3 %    Absolute Neutrophil 4.7 1.6 - 8.3 10e9/L    Absolute Lymphocytes 1.4 0.8 - 5.3 10e9/L    Absolute Monocytes 0.6 0.0 - 1.3 10e9/L    Absolute Eosinophils 0.1 0.0 - 0.7 10e9/L    Absolute Basophils 0.1 0.0 - 0.2 10e9/L    Abs Immature Granulocytes 0.0 0 - 0.4 10e9/L   Comprehensive Metabolic Panel   Result Value Ref Range    Sodium 134 134 - 144 mmol/L    Potassium 4.2 3.5 - 5.1 mmol/L    Chloride 102 98 - 107 mmol/L    Carbon Dioxide 26 21 - 31 mmol/L    Anion Gap 6 3 - 14 mmol/L    Glucose 104 70 - 105 mg/dL    Urea Nitrogen 7 7 - 25 mg/dL    Creatinine 0.61 0.60 - 1.20 mg/dL    GFR Estimate >90 >60 mL/min/[1.73_m2]    GFR Estimate If Black >90 >60 mL/min/[1.73_m2]    Calcium 8.7 8.6 - 10.3 mg/dL    Bilirubin Total 0.5 0.3 - 1.0 mg/dL    Albumin 4.0 3.5 - 5.7 g/dL    Protein Total 6.6 6.4 - 8.9 g/dL    Alkaline Phosphatase 105 (H) 34 - 104 U/L     ALT 20 7 - 52 U/L    AST 21 13 - 39 U/L         ASSESSMENT/PLAN:         (E16.2) Hypoglycemia  (primary encounter diagnosis)  Comment: this would really explain nearly all of the episodic symptoms.  She realizes this.  Discussed with her ways to prevent this, by eating small amounts more often through the day.  Is due for annual bypass labs.  Also discussed with her need to take her meds constantly, including B 12 injections.  Plan: Comprehensive Metabolic Panel, CBC and         Differential             (E53.8) B12 deficiency  Comment: stable  Plan: cyanocobalamin (CYANOCOBALAMIN) 1000 MCG/ML         injection, Vitamin B12             (F33.0) Major depressive disorder, recurrent, mild (H)  Comment: improved  Plan: no changes    (M22.2X2) Patellofemoral pain syndrome of left knee  Comment: chronic  Plan: DRAIN/INJECT LARGE JOINT/BURSA [20610],         methylPREDNISolone (DEPO-MEDROL) injection 80         mg        Repeat injections    (M25.561) Patellofemoral arthralgia of right knee  Comment: stable  Plan: DRAIN/INJECT LARGE JOINT/BURSA [20610],         methylPREDNISolone (DEPO-MEDROL) injection 80         mg        Repeat injections    (Z98.84) History of gastric bypass  Comment:    Plan: CBC and Differential                 Alin Pimentel MD  [facility] AND \[facility]\""

## 2019-07-30 NOTE — NURSING NOTE
"Coming in for a medication check up, med was increased, injections in both knees, still real shaky    Chief Complaint   Patient presents with     Recheck Medication     check, injection in both knees, shaking is real bad       Initial /70 (BP Location: Right arm, Patient Position: Sitting, Cuff Size: Adult Large)   Pulse 96   Temp 98.1  F (36.7  C) (Temporal)   Resp 14   Wt 124.1 kg (273 lb 9.6 oz)   SpO2 98%   Breastfeeding? No   BMI 45.31 kg/m   Estimated body mass index is 45.31 kg/m  as calculated from the following:    Height as of 1/31/19: 1.655 m (5' 5.16\").    Weight as of this encounter: 124.1 kg (273 lb 9.6 oz).  Medication Reconciliation: complete    Yadira Warner LPN  "

## 2019-07-31 ASSESSMENT — ANXIETY QUESTIONNAIRES: GAD7 TOTAL SCORE: 3

## 2019-09-26 ENCOUNTER — MYC MEDICAL ADVICE (OUTPATIENT)
Dept: FAMILY MEDICINE | Facility: OTHER | Age: 39
End: 2019-09-26

## 2019-10-18 ENCOUNTER — NURSE TRIAGE (OUTPATIENT)
Dept: FAMILY MEDICINE | Facility: OTHER | Age: 39
End: 2019-10-18

## 2019-10-18 NOTE — TELEPHONE ENCOUNTER
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 Pt thinks it may be a pulled muscle but is unsure. Pain starts over her heart and radiates to left shoulder. Pt states the pain is worsening in intensity. Per protocol recommendation given for patient to be evaluated in ER. Patient verbalized understanding.    Reason for Disposition    Pain also present in shoulder(s) or arm(s) or jaw    Additional Information    Negative: Severe difficulty breathing (e.g., struggling for each breath, speaks in single words)    Negative: Passed out (i.e., fainted, collapsed and was not responding)    Negative: Visible sweat on face or sweat dripping down face    Negative: Sounds like a life-threatening emergency to the triager    Negative: Chest pain lasting longer than 5 minutes and ANY of the following:* Over 50 years old* Over 30 years old and at least one cardiac risk factor (i.e., high blood pressure, diabetes, high cholesterol, obesity, smoker or strong family history of heart disease)* Pain is crushing, pressure-like, or heavy * Took nitroglycerin and chest pain was not relieved* History of heart disease (i.e., angina, heart attack, bypass surgery, angioplasty, CHF)    Protocols used: CHEST PAIN-A-[place]    Cathy Noriega RN .............. 10/18/2019  3:24 PM

## 2019-10-19 ENCOUNTER — HOSPITAL ENCOUNTER (EMERGENCY)
Facility: OTHER | Age: 39
Discharge: HOME | End: 2019-10-19
Attending: PHYSICIAN ASSISTANT | Admitting: PHYSICIAN ASSISTANT
Payer: COMMERCIAL

## 2019-10-19 ENCOUNTER — APPOINTMENT (OUTPATIENT)
Dept: CT IMAGING | Facility: OTHER | Age: 39
End: 2019-10-19
Attending: PHYSICIAN ASSISTANT
Payer: COMMERCIAL

## 2019-10-19 VITALS
HEIGHT: 65 IN | TEMPERATURE: 98 F | HEART RATE: 77 BPM | OXYGEN SATURATION: 98 % | DIASTOLIC BLOOD PRESSURE: 78 MMHG | BODY MASS INDEX: 43.32 KG/M2 | SYSTOLIC BLOOD PRESSURE: 159 MMHG | RESPIRATION RATE: 19 BRPM | WEIGHT: 260 LBS

## 2019-10-19 DIAGNOSIS — R07.81 PLEURITIC CHEST PAIN: ICD-10-CM

## 2019-10-19 LAB
ALBUMIN SERPL-MCNC: 4.2 G/DL (ref 3.5–5.7)
ALBUMIN UR-MCNC: NEGATIVE MG/DL
ALP SERPL-CCNC: 103 U/L (ref 34–104)
ALT SERPL W P-5'-P-CCNC: 17 U/L (ref 7–52)
ANION GAP SERPL CALCULATED.3IONS-SCNC: 7 MMOL/L (ref 3–14)
APPEARANCE UR: CLEAR
AST SERPL W P-5'-P-CCNC: 17 U/L (ref 13–39)
BASOPHILS # BLD AUTO: 0 10E9/L (ref 0–0.2)
BASOPHILS NFR BLD AUTO: 0.3 %
BILIRUB SERPL-MCNC: 0.5 MG/DL (ref 0.3–1)
BILIRUB UR QL STRIP: NEGATIVE
BUN SERPL-MCNC: 13 MG/DL (ref 7–25)
CALCIUM SERPL-MCNC: 9.1 MG/DL (ref 8.6–10.3)
CHLORIDE SERPL-SCNC: 96 MMOL/L (ref 98–107)
CO2 SERPL-SCNC: 26 MMOL/L (ref 21–31)
COLOR UR AUTO: YELLOW
CREAT SERPL-MCNC: 0.82 MG/DL (ref 0.6–1.2)
D DIMER PPP DDU-MCNC: 380 NG/ML D-DU (ref 0–230)
DIFFERENTIAL METHOD BLD: NORMAL
EOSINOPHIL # BLD AUTO: 0.1 10E9/L (ref 0–0.7)
EOSINOPHIL NFR BLD AUTO: 1.4 %
ERYTHROCYTE [DISTWIDTH] IN BLOOD BY AUTOMATED COUNT: 11.6 % (ref 10–15)
GFR SERPL CREATININE-BSD FRML MDRD: 78 ML/MIN/{1.73_M2}
GLUCOSE SERPL-MCNC: 179 MG/DL (ref 70–105)
GLUCOSE UR STRIP-MCNC: 500 MG/DL
HCG UR QL: NEGATIVE
HCT VFR BLD AUTO: 38.9 % (ref 35–47)
HGB BLD-MCNC: 13.1 G/DL (ref 11.7–15.7)
HGB UR QL STRIP: ABNORMAL
IMM GRANULOCYTES # BLD: 0 10E9/L (ref 0–0.4)
IMM GRANULOCYTES NFR BLD: 0.3 %
INR PPP: 1.08 (ref 0–1.3)
KETONES UR STRIP-MCNC: NEGATIVE MG/DL
LEUKOCYTE ESTERASE UR QL STRIP: NEGATIVE
LIPASE SERPL-CCNC: 25 U/L (ref 11–82)
LYMPHOCYTES # BLD AUTO: 1.5 10E9/L (ref 0.8–5.3)
LYMPHOCYTES NFR BLD AUTO: 15.7 %
MCH RBC QN AUTO: 32 PG (ref 26.5–33)
MCHC RBC AUTO-ENTMCNC: 33.7 G/DL (ref 31.5–36.5)
MCV RBC AUTO: 95 FL (ref 78–100)
MONOCYTES # BLD AUTO: 0.4 10E9/L (ref 0–1.3)
MONOCYTES NFR BLD AUTO: 4.7 %
NEUTROPHILS # BLD AUTO: 7.2 10E9/L (ref 1.6–8.3)
NEUTROPHILS NFR BLD AUTO: 77.6 %
NITRATE UR QL: NEGATIVE
NON-SQ EPI CELLS #/AREA URNS LPF: ABNORMAL /LPF
PH UR STRIP: 6.5 PH (ref 5–9)
PLATELET # BLD AUTO: 354 10E9/L (ref 150–450)
POTASSIUM SERPL-SCNC: 3.7 MMOL/L (ref 3.5–5.1)
PROT SERPL-MCNC: 7 G/DL (ref 6.4–8.9)
RBC # BLD AUTO: 4.09 10E12/L (ref 3.8–5.2)
RBC #/AREA URNS AUTO: ABNORMAL /HPF
SODIUM SERPL-SCNC: 129 MMOL/L (ref 134–144)
SOURCE: ABNORMAL
SP GR UR STRIP: 1.02 (ref 1–1.03)
TROPONIN I SERPL-MCNC: <2.3 PG/ML
TROPONIN I SERPL-MCNC: <2.3 PG/ML
UROBILINOGEN UR STRIP-ACNC: 4 EU/DL (ref 0.2–1)
WBC # BLD AUTO: 9.3 10E9/L (ref 4–11)
WBC #/AREA URNS AUTO: ABNORMAL /HPF

## 2019-10-19 PROCEDURE — 84484 ASSAY OF TROPONIN QUANT: CPT | Performed by: FAMILY MEDICINE

## 2019-10-19 PROCEDURE — 25000128 H RX IP 250 OP 636: Performed by: PHYSICIAN ASSISTANT

## 2019-10-19 PROCEDURE — 71275 CT ANGIOGRAPHY CHEST: CPT | Mod: TC

## 2019-10-19 PROCEDURE — 93005 ELECTROCARDIOGRAM TRACING: CPT | Performed by: PHYSICIAN ASSISTANT

## 2019-10-19 PROCEDURE — 25500064 ZZH RX 255 OP 636: Performed by: PHYSICIAN ASSISTANT

## 2019-10-19 PROCEDURE — 99284 EMERGENCY DEPT VISIT MOD MDM: CPT | Mod: Z6 | Performed by: PHYSICIAN ASSISTANT

## 2019-10-19 PROCEDURE — 85379 FIBRIN DEGRADATION QUANT: CPT | Performed by: FAMILY MEDICINE

## 2019-10-19 PROCEDURE — 84484 ASSAY OF TROPONIN QUANT: CPT | Performed by: PHYSICIAN ASSISTANT

## 2019-10-19 PROCEDURE — 36415 COLL VENOUS BLD VENIPUNCTURE: CPT | Performed by: PHYSICIAN ASSISTANT

## 2019-10-19 PROCEDURE — 96374 THER/PROPH/DIAG INJ IV PUSH: CPT | Mod: XU | Performed by: PHYSICIAN ASSISTANT

## 2019-10-19 PROCEDURE — 85025 COMPLETE CBC W/AUTO DIFF WBC: CPT | Performed by: FAMILY MEDICINE

## 2019-10-19 PROCEDURE — 83690 ASSAY OF LIPASE: CPT | Performed by: FAMILY MEDICINE

## 2019-10-19 PROCEDURE — 81001 URINALYSIS AUTO W/SCOPE: CPT | Performed by: PHYSICIAN ASSISTANT

## 2019-10-19 PROCEDURE — 81025 URINE PREGNANCY TEST: CPT | Performed by: PHYSICIAN ASSISTANT

## 2019-10-19 PROCEDURE — 93010 ELECTROCARDIOGRAM REPORT: CPT | Performed by: INTERNAL MEDICINE

## 2019-10-19 PROCEDURE — 99285 EMERGENCY DEPT VISIT HI MDM: CPT | Mod: 25 | Performed by: PHYSICIAN ASSISTANT

## 2019-10-19 PROCEDURE — 85610 PROTHROMBIN TIME: CPT | Performed by: FAMILY MEDICINE

## 2019-10-19 PROCEDURE — 80053 COMPREHEN METABOLIC PANEL: CPT | Performed by: FAMILY MEDICINE

## 2019-10-19 PROCEDURE — 36415 COLL VENOUS BLD VENIPUNCTURE: CPT | Performed by: FAMILY MEDICINE

## 2019-10-19 RX ORDER — KETOROLAC TROMETHAMINE 15 MG/ML
15 INJECTION, SOLUTION INTRAMUSCULAR; INTRAVENOUS ONCE
Status: COMPLETED | OUTPATIENT
Start: 2019-10-19 | End: 2019-10-19

## 2019-10-19 RX ORDER — PREDNISONE 20 MG/1
TABLET ORAL
Qty: 10 TABLET | Refills: 0 | Status: SHIPPED | OUTPATIENT
Start: 2019-10-19 | End: 2019-10-28

## 2019-10-19 RX ADMIN — SODIUM CHLORIDE 1000 ML: 9 INJECTION, SOLUTION INTRAVENOUS at 16:55

## 2019-10-19 RX ADMIN — IOHEXOL 100 ML: 350 INJECTION, SOLUTION INTRAVENOUS at 16:53

## 2019-10-19 RX ADMIN — KETOROLAC TROMETHAMINE 15 MG: 15 INJECTION, SOLUTION INTRAMUSCULAR; INTRAVENOUS at 16:35

## 2019-10-19 ASSESSMENT — MIFFLIN-ST. JEOR: SCORE: 1865.23

## 2019-10-19 NOTE — ED AVS SNAPSHOT
[facility] and [facility]  [address]  [place] [address]  Phone:  [phone]  Fax:  [phone]                                    Laverne Nguyen   MRN: [id number]    Department:  [facility]   Date of Visit:  10/19/2019           After Visit Summary Signature Page    I have received my discharge instructions, and my questions have been answered. I have discussed any challenges I see with this plan with the nurse or doctor.    ..........................................................................................................................................  Patient/Patient Representative Signature      ..........................................................................................................................................  Patient Representative Print Name and Relationship to Patient    ..................................................               ................................................  Date                                   Time    ..........................................................................................................................................  Reviewed by Signature/Title    ...................................................              ..............................................  Date                                               Time          22EPIC Rev 08/18

## 2019-10-19 NOTE — LETTER
October 19, 2019      To Whom It May Concern:      Laverne Nguyen was seen in our Emergency Department today, 10/19/19.  I expect her condition to improve over the next 3 days.  She may return to work when improved.    Sincerely,        COURTNEY Cooper

## 2019-10-19 NOTE — ED TRIAGE NOTES
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 Now she went back to work on Thursday and the pain is constant and feels like \"stabbing\" to her left chest. States it is making her feel like she can't breathe. She worked the night shift last night, took an ativan to sleep, and then woke up and hurt more.   "

## 2019-10-19 NOTE — DISCHARGE INSTRUCTIONS
Get plenty of fluids and rest.  Take your medication as directed.  You can also take Tylenol to help with discomfort.  Is unclear what is causing her chest pain today but as we discussed does not appear to be life-threatening cause at this time.  Referral was placed for you to follow-up with your PCP as well as obtain an echocardiogram, they should contact you for those appointments.  Even though things look well today, I recommend that if you have worsening or concerning symptoms that you be reevaluated.

## 2019-10-22 ASSESSMENT — ENCOUNTER SYMPTOMS
COUGH: 1
CHILLS: 0
FEVER: 0
HEMATURIA: 0
ADENOPATHY: 0
SHORTNESS OF BREATH: 0
CONFUSION: 0
ABDOMINAL PAIN: 0
BRUISES/BLEEDS EASILY: 0
WOUND: 0
BACK PAIN: 0
CHEST TIGHTNESS: 0

## 2019-10-22 NOTE — ED PROVIDER NOTES
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 States she coughed and the pain worsened today.     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 Now she went back to work on Thursday and the pain is constant and feels like \"stabbing\" to her left chest. States it is making her feel like she can't breathe. She worked the night shift last night, took an ativan to sleep, and then woke up and hurt more.    Allergies:  Allergies   Allergen Reactions     Amoxicillin Other (See Comments)     Pt reports it is ineffective for her personally       Problem List:    Patient Active Problem List    Diagnosis Date Noted     Patellofemoral arthralgia of right knee 07/30/2019     Priority: Medium     Tobacco abuse 03/07/2018     Priority: Medium     Morbid obesity (H) 03/07/2018     Priority: Medium     Overview:   2007, BMI 51       Essential hypertension 03/07/2018     Priority: Medium     Primary insomnia 02/28/2018     Priority: Medium     Lumbar facet joint syndrome 01/05/2018     Priority: Medium     Major depressive disorder, recurrent, mild (H) 01/02/2015     Priority: Medium     Pain medication agreement 11/20/2014     Priority: Medium     Overview:   Updated 5/6/2016       Degenerative arthritis of knee 11/10/2014     Priority: Medium     Avulsion fracture of ankle, right, closed, initial encounter 08/04/2014     Priority: Medium     Patellofemoral pain syndrome of left knee 08/04/2014     Priority: Medium     Rectal bleeding 01/09/2014     Priority: Medium     Otosclerosis 05/16/2013     Priority: Medium     Anxiety state 03/29/2013     " Priority: Medium     Overview:   IMO Update       History of gastric bypass 03/29/2013     Priority: Medium     Idiopathic angioedema 11/29/2012     Priority: Medium     Other specified hypothyroidism 10/12/2010     Priority: Medium     B12 deficiency 09/15/2010     Priority: Medium     Overview:   secondary to gastric bypass          Past Medical History:    Past Medical History:   Diagnosis Date     Cannabis abuse, uncomplicated      Encounter for general adult medical examination without abnormal findings      Essential (primary) hypertension      Gastro-esophageal reflux disease without esophagitis      Low back pain      Morbid (severe) obesity due to excess calories (H)      Other specified anxiety disorders      Otitis externa of both ears      Personal history of other medical treatment (CODE)      Tobacco use        Past Surgical History:    Past Surgical History:   Procedure Laterality Date     ADENOIDECTOMY      03/87     ANKLE SURGERY      8/14     COLONOSCOPY  01/09/2014    hyperplastic, 10 year follow up     LAPAROSCOPIC TUBAL LIGATION      2009     OTHER SURGICAL HISTORY      10/86,15970.0,WI CREATE EARDRUM OPENING GEN ANESTH     OTHER SURGICAL HISTORY      1980,90131.0,WI CREATE EARDRUM OPENING GEN ANESTH     OTHER SURGICAL HISTORY      1992,225974,OTHER, with left PE tube     OTHER SURGICAL HISTORY      01/94,861182,OTHER     OTHER SURGICAL HISTORY      05/22/07,391621,OTHER, for obesity, Dr. Hogue [place]       Family History:    Family History   Problem Relation Age of Onset     Hypertension Mother         Hypertension     Other - See Comments Mother         History of depression     Hypertension Brother         Hypertension       Social History:  Marital Status:   [2]  Social History     Tobacco Use     Smoking status: Former Smoker     Packs/day: 0.25     Types: Cigarettes     Smokeless tobacco: Never Used   Substance Use Topics     Alcohol use: No     Frequency: Never     Drug use:  "No        Medications:    buPROPion (WELLBUTRIN XL) 150 MG 24 hr tablet  cetirizine (ZYRTEC) 10 MG tablet  Cholecalciferol (VITAMIN D3) 2000 units CAPS  cyanocobalamin (CYANOCOBALAMIN) 1000 MCG/ML injection  FLUoxetine (PROZAC) 40 MG capsule  levothyroxine (SYNTHROID/LEVOTHROID) 175 MCG tablet  lisinopril-hydrochlorothiazide (PRINZIDE/ZESTORETIC) 20-12.5 MG tablet  LORazepam (ATIVAN) 1 MG tablet  metroNIDAZOLE (METROGEL) 1 % gel  Multiple Vitamin (MULTI-VITAMINS) TABS  order for DME  predniSONE (DELTASONE) 20 MG tablet  syringe/needle, disp, (MEDSAVER SYRINGE) 25G X 1\" 3 ML MISC  traMADol (ULTRAM) 50 MG tablet  traZODone (DESYREL) 100 MG tablet  EPINEPHrine (EPIPEN/ADRENACLICK/OR ANY BX GENERIC EQUIV) 0.3 MG/0.3ML injection 2-pack          Review of Systems   Constitutional: Negative for chills and fever.   HENT: Negative for congestion.    Eyes: Negative for visual disturbance.   Respiratory: Positive for cough. Negative for chest tightness and shortness of breath.    Cardiovascular: Positive for chest pain.   Gastrointestinal: Negative for abdominal pain.   Genitourinary: Negative for hematuria.   Musculoskeletal: Negative for back pain.   Skin: Negative for rash and wound.   Neurological: Negative for syncope.   Hematological: Negative for adenopathy. Does not bruise/bleed easily.   Psychiatric/Behavioral: Negative for confusion.       Physical Exam   BP: 127/82  Pulse: 113  Heart Rate: 89  Temp: 98.2  F (36.8  C)  Resp: 24  Height: 165.1 cm (5' 5\")  Weight: 117.9 kg (260 lb)  SpO2: 98 %      Physical Exam  Constitutional:       General: She is not in acute distress.     Appearance: She is well-developed. She is not diaphoretic.   HENT:      Head: Normocephalic and atraumatic.   Eyes:      General: No scleral icterus.     Conjunctiva/sclera: Conjunctivae normal.   Neck:      Musculoskeletal: Neck supple.   Cardiovascular:      Rate and Rhythm: Normal rate and regular rhythm.      Heart sounds: Normal heart " sounds. No murmur.   Pulmonary:      Effort: Pulmonary effort is normal. No respiratory distress.      Breath sounds: Normal breath sounds.   Abdominal:      Palpations: Abdomen is soft.      Tenderness: There is no tenderness.   Musculoskeletal: Normal range of motion.         General: No deformity.   Lymphadenopathy:      Cervical: No cervical adenopathy.   Skin:     General: Skin is warm and dry.      Findings: No rash.   Neurological:      General: No focal deficit present.      Mental Status: She is alert and oriented to person, place, and time.   Psychiatric:         Mood and Affect: Mood normal.         Behavior: Behavior normal.         ED Course        Procedures               Critical Care time:  none               No results found for this or any previous visit (from the past 24 hour(s)).    Medications   0.9% sodium chloride BOLUS (0 mLs Intravenous Stopped 10/19/19 1837)   ketorolac (TORADOL) injection 15 mg (15 mg Intravenous Given 10/19/19 1635)   iohexol (OMNIPAQUE) 350 mg/mL solution 100 mL (100 mLs Intravenous Given 10/19/19 1653)       Assessments & Plan (with Medical Decision Making)   Patient is nontoxic-appearing no acute distress.  Heart slightly tachycardic, lung, bowel sounds normal.  Abdomen soft nontender palpation, nondistended.  Patient afebrile.  Good equal peripheral pulses in all extremities.    Patient had negative troponin, slightly hyponatremic.  Normal white count and hemoglobin.  She did have a positive dimer and CT study was ordered which showed no PE or aortic abnormalities.  Urinalysis is not concerning she is not pregnant.  EKG shows sinus tachycardia with no ST changes.    It is unclear was causing her discomfort today.  However, patient is describing pleuritic chest pain, it is possible that this is pleurisy type event that is occurring.  We will send home a short course of prednisone.  I Scheduled a stress echocardiogram for the patient as well as follow-up for PCP.   Strict return precautions were given to the patient, she understands and agrees with the plan and she is discharged.    Arturo Villanueva PA-C    I have reviewed the nursing notes.    I have reviewed the findings, diagnosis, plan and need for follow up with the patient.       Discharge Medication List as of 10/19/2019  6:41 PM      START taking these medications    Details   predniSONE (DELTASONE) 20 MG tablet Take two tablets (= 40mg) each day for 5 (five) days, Disp-10 tablet, R-0, E-Prescribe             Final diagnoses:   Pleuritic chest pain       10/19/2019   [facility]     Arturo Villanueva PA  10/22/19 0008       Arturo Villanueva PA  10/23/19 1157

## 2019-10-24 ENCOUNTER — TELEPHONE (OUTPATIENT)
Dept: CARDIOLOGY | Facility: OTHER | Age: 39
End: 2019-10-24

## 2019-10-24 NOTE — TELEPHONE ENCOUNTER
Patient verified .  Reminder call for stress test with instructions given. Instructed patient on bike echo stress test.  Informed patient she would need to bike usually 6 to 12 minutes on a recumbent bike  and she stated  she would be able to do this even with having chronic knee issues.   Patient verbalized understanding.

## 2019-10-25 ENCOUNTER — HOSPITAL ENCOUNTER (OUTPATIENT)
Dept: CARDIOLOGY | Facility: OTHER | Age: 39
Discharge: HOME | End: 2019-10-25
Attending: PHYSICIAN ASSISTANT | Admitting: PHYSICIAN ASSISTANT
Payer: COMMERCIAL

## 2019-10-25 DIAGNOSIS — R07.81 PLEURITIC CHEST PAIN: ICD-10-CM

## 2019-10-25 PROCEDURE — 93321 DOPPLER ECHO F-UP/LMTD STD: CPT | Mod: 53 | Performed by: INTERNAL MEDICINE

## 2019-10-25 PROCEDURE — 25500064 ZZH RX 255 OP 636: Performed by: PHYSICIAN ASSISTANT

## 2019-10-25 PROCEDURE — 40000264 ECHO STRESS ECHOCARDIOGRAM: Mod: 53

## 2019-10-25 PROCEDURE — 93016 CV STRESS TEST SUPVJ ONLY: CPT | Mod: 53 | Performed by: INTERNAL MEDICINE

## 2019-10-25 PROCEDURE — 93325 DOPPLER ECHO COLOR FLOW MAPG: CPT | Mod: 53 | Performed by: INTERNAL MEDICINE

## 2019-10-25 PROCEDURE — 93018 CV STRESS TEST I&R ONLY: CPT | Performed by: INTERNAL MEDICINE

## 2019-10-25 PROCEDURE — 93350 STRESS TTE ONLY: CPT | Mod: 53 | Performed by: INTERNAL MEDICINE

## 2019-10-25 RX ADMIN — PERFLUTREN 2 ML: 6.52 INJECTION, SUSPENSION INTRAVENOUS at 09:30

## 2019-10-25 NOTE — PROGRESS NOTES
08:55:  The patient arrived for a stress echo.  The procedure, risks and benefits were discussed and the consent was signed.  The patient was prepped for the stress test, and an IV was placed per procedure.  The echo sonographer did the initial images with definity for image enhancement.    arrived, and the patient biked 6minutes and 40seconds.  The patient was unable to bike any farther due to knee discomfort.  Test stopped target heart rate not achieved. Stress images were completed and the IV was removed per procedure.  The patient was released in stable condition.  The patient was instructed that at her appointment on 10/28/19 at 1:30 with Symone VALDEZ  will decide if a different test is needed.  Knee discomfort subsided.  Please see the chart for complete test results.  Note sent to data processing re:  Charge for test per patient request.

## 2019-10-28 ENCOUNTER — OFFICE VISIT (OUTPATIENT)
Dept: FAMILY MEDICINE | Facility: OTHER | Age: 39
End: 2019-10-28
Attending: PHYSICIAN ASSISTANT
Payer: COMMERCIAL

## 2019-10-28 VITALS
SYSTOLIC BLOOD PRESSURE: 118 MMHG | TEMPERATURE: 98.3 F | HEART RATE: 76 BPM | DIASTOLIC BLOOD PRESSURE: 74 MMHG | WEIGHT: 269.8 LBS | OXYGEN SATURATION: 98 % | RESPIRATION RATE: 20 BRPM | BODY MASS INDEX: 44.9 KG/M2

## 2019-10-28 DIAGNOSIS — M94.0 ACUTE COSTOCHONDRITIS: Primary | ICD-10-CM

## 2019-10-28 DIAGNOSIS — R73.09 ELEVATED GLUCOSE: ICD-10-CM

## 2019-10-28 DIAGNOSIS — Z23 NEEDS FLU SHOT: ICD-10-CM

## 2019-10-28 DIAGNOSIS — R07.9 CHEST PAIN, UNSPECIFIED TYPE: ICD-10-CM

## 2019-10-28 DIAGNOSIS — R94.31 NONSPECIFIC ST-T WAVE ELECTROCARDIOGRAPHIC CHANGES: ICD-10-CM

## 2019-10-28 LAB
ANION GAP SERPL CALCULATED.3IONS-SCNC: 7 MMOL/L (ref 3–14)
BUN SERPL-MCNC: 14 MG/DL (ref 7–25)
CALCIUM SERPL-MCNC: 9.7 MG/DL (ref 8.6–10.3)
CHLORIDE SERPL-SCNC: 100 MMOL/L (ref 98–107)
CO2 SERPL-SCNC: 28 MMOL/L (ref 21–31)
CREAT SERPL-MCNC: 0.75 MG/DL (ref 0.6–1.2)
GFR SERPL CREATININE-BSD FRML MDRD: 87 ML/MIN/{1.73_M2}
GLUCOSE SERPL-MCNC: 106 MG/DL (ref 70–105)
HBA1C MFR BLD: 5.2 % (ref 4–6)
POTASSIUM SERPL-SCNC: 4.6 MMOL/L (ref 3.5–5.1)
SODIUM SERPL-SCNC: 135 MMOL/L (ref 134–144)

## 2019-10-28 PROCEDURE — 90471 IMMUNIZATION ADMIN: CPT | Performed by: PHYSICIAN ASSISTANT

## 2019-10-28 PROCEDURE — 36415 COLL VENOUS BLD VENIPUNCTURE: CPT | Mod: ZL | Performed by: PHYSICIAN ASSISTANT

## 2019-10-28 PROCEDURE — 99214 OFFICE O/P EST MOD 30 MIN: CPT | Mod: 25 | Performed by: PHYSICIAN ASSISTANT

## 2019-10-28 PROCEDURE — 83036 HEMOGLOBIN GLYCOSYLATED A1C: CPT | Mod: ZL | Performed by: PHYSICIAN ASSISTANT

## 2019-10-28 PROCEDURE — 80048 BASIC METABOLIC PNL TOTAL CA: CPT | Mod: ZL | Performed by: PHYSICIAN ASSISTANT

## 2019-10-28 PROCEDURE — 90686 IIV4 VACC NO PRSV 0.5 ML IM: CPT | Performed by: PHYSICIAN ASSISTANT

## 2019-10-28 ASSESSMENT — ANXIETY QUESTIONNAIRES
6. BECOMING EASILY ANNOYED OR IRRITABLE: SEVERAL DAYS
7. FEELING AFRAID AS IF SOMETHING AWFUL MIGHT HAPPEN: SEVERAL DAYS
IF YOU CHECKED OFF ANY PROBLEMS ON THIS QUESTIONNAIRE, HOW DIFFICULT HAVE THESE PROBLEMS MADE IT FOR YOU TO DO YOUR WORK, TAKE CARE OF THINGS AT HOME, OR GET ALONG WITH OTHER PEOPLE: SOMEWHAT DIFFICULT
1. FEELING NERVOUS, ANXIOUS, OR ON EDGE: SEVERAL DAYS
3. WORRYING TOO MUCH ABOUT DIFFERENT THINGS: SEVERAL DAYS
5. BEING SO RESTLESS THAT IT IS HARD TO SIT STILL: NOT AT ALL
2. NOT BEING ABLE TO STOP OR CONTROL WORRYING: SEVERAL DAYS
GAD7 TOTAL SCORE: 6

## 2019-10-28 ASSESSMENT — PATIENT HEALTH QUESTIONNAIRE - PHQ9
5. POOR APPETITE OR OVEREATING: SEVERAL DAYS
SUM OF ALL RESPONSES TO PHQ QUESTIONS 1-9: 6

## 2019-10-28 ASSESSMENT — PAIN SCALES - GENERAL: PAINLEVEL: MODERATE PAIN (5)

## 2019-10-28 NOTE — PATIENT INSTRUCTIONS
Encouraged to take tylenol for relief up to 4 times per day.  Encouraged rest and minimal heavy lifting.  Encouraged to use ice or heat 15 minutes at a time several times per day to decrease pain. Return to clinic in 1-2 weeks as necessary for persistent pain. Return to clinic with any change or worsening of symptoms.       Patient Education     Costochondritis    Costochondritis is inflammation of a rib or the cartilage that connects a rib to your breastbone (sternum). It causes tenderness, and sometimes chest pain may be sharp or aching, or it may feel like pressure. Pain may get worse with deep breathing, movement, or exercise. In some cases, the pain is mistaken for a heart attack. Despite this, the condition is not serious. Read on to learn more about the condition and how it can be treated.  What causes costochondritis?  The cause of costochondritis is not completely clear, but it may happen after a chest injury, chest infection, or coughing episode. Some physical activities can sometimes lead to costochondritis. Large-breasted women may be more likely to have the condition. Often, the reason for the inflammation is unknown.  Diagnosing costochondritis  There is no test for costochondritis. The condition is diagnosed by the symptoms you have. Your healthcare provider will perform a physical exam. He or she will ask you about your symptoms and examine your chest for tenderness. In some cases, tests are done to rule out more serious problems. These tests may include imaging tests such as chest X-ray, CT scan, or an ECG.  Treating costochondritis  If an underlying cause is found, treatment for that will likely relieve the problem. Costochondritis often goes away on its own. The course of the condition varies from person to person. It usually lasts from weeks to months. In some cases, mild symptoms continue for months to years. To ease symptoms:    Take medicine as directed. These relieve pain and swelling.  Ibuprofen or other NSAIDs are often recommended. In some cases, you may be given prescription medicine, such as muscle relaxants.    Avoid activities that put stress on the chest or spine.    Apply a heating pad (set to warm, not too high, heat) to the breastbone several times a day.    Perform stretching exercises as directed.  Call the healthcare provider right away if you have any of the following:    Pain that is not relieved by medicine    Shortness of breath    Lightheadedness, dizziness, or fainting    Feeling of irregular heartbeat or fast pulse  Anyone with chest pain should see a healthcare provider, especially those who are older and may be at risk for heart disease.   Date Last Reviewed: 10/1/2016    [id number] [employer]. [address] [place] [address], [place] [address]. All rights reserved. This information is not intended as a substitute for professional medical care. Always follow your healthcare professional's instructions.           Patient Education     Chest Wall Pain: Costochondritis    The chest pain that you have had today is caused by costochondritis. This condition is caused by an inflammation of the cartilage joining your ribs to your breastbone. It is not caused by heart or lung problems. Your healthcare team has made sure that the chest pain you feel is not from a life threatening cause of chest pain such as heart attack, collapsed lung, blood clot in the lung, tear in the aorta, or esophageal rupture. The inflammation may have been brought on by a blow to the chest, lifting heavy objects, intense exercise, or an illness that made you cough and sneeze a lot. It often occurs during times of emotional stress. It can be painful, but it is not dangerous. It usually goes away in 1 to 2 weeks. But it may happen again. Rarely, a more serious condition may cause symptoms similar to costochondritis. That s why it s important to watch for the warning signs listed below.  Home care  Follow  these guidelines when caring for yourself at home:    If you feel that emotional stress is a cause of your condition, try to figure out the sources of that stress. It may not be obvious. Learn ways to deal with the stress in your life. This can include regular exercise, muscle relaxation, meditation, or simply taking time out for yourself.    You may use acetaminophen, ibuprofen, or naproxen to control pain, unless another pain medicine was prescribed. If you have liver or kidney disease or ever had a stomach ulcer, talk with your healthcare provider before using these medicines.    You can also help ease pain by using a hot, wet compress or heating pad. Use this with or without a medicated skin cream that helps relieves pain.    Do stretching exercise as advised by your provider.    Take any prescribed medicines as directed.  Follow-up care  Follow up with your healthcare provider, or as advised, if you do not start to get better in the next 2 days.  When to seek medical advice  Call your healthcare provider right away if any of these occur:    A change in the type of pain. Call if it feels different, becomes more serious, lasts longer, or spreads into your shoulder, arm, neck, jaw, or back.    Shortness of breath or pain gets worse when you breathe    Weakness, dizziness, or fainting    Cough with dark-colored sputum (phlegm) or blood    Abdominal pain    Dark red or black stools    Fever of 100.4 F (38 C) or higher, or as directed by your healthcare provider  Date Last Reviewed: 12/1/2016 2000-2018 [employer]. [address] [place] [address], [place] [address]. All rights reserved. This information is not intended as a substitute for professional medical care. Always follow your healthcare professional's instructions.

## 2019-10-28 NOTE — PROGRESS NOTES
"Nursing Notes:   Marilee Ortiz LPN  10/28/2019  2:06 PM  Addendum  Patient is here for ER follow up for chest pains.   Marilee Ortiz LPN .............10/28/2019     1:18 PM      No LMP recorded (lmp unknown). Patient has had an ablation.  Medication Reconciliation: complete    Marilee Ortiz LPN  10/28/2019 1:23 PM      Immunization Documentation    Prior to Immunization administration, verified patients identity using patient's name and date of birth. Please see IMMUNIZATIONS  and order for additional information.  Patient / Parent instructed to remain in clinic for 15 minutes and report any adverse reaction to staff immediately.    Was entire vial of medication used? Yes  Vial/Syringe: Syringe    Marilee Ortiz LPN  10/28/2019        HPI:    Laverne Nguyen is a 39 year old female who presents for ER follow up.  Patient was in the emergency room on 10/19/2019.  At that time she had chest pain and a cough. 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 Then she went back to work on Thursday 10/17 and the pain was constant and felt like \"stabbing\" to her left chest. States it was making her feel like she couldn't breathe. She worked the night shift, took an ativan to sleep, and then woke up and it hurt more. Went into the ER on 10/19.   Patient had negative troponin, slightly hyponatremic.  Normal white count and hemoglobin.  She did have a positive dimer and CT study was ordered which showed no PE or aortic abnormalities.  Urinalysis is not concerning she is not pregnant.  EKG shows sinus tachycardia with nonspecific ST changes.  Patient was discharged with a short course of prednisone due to possible pleurisy.  Patient was scheduled for a stress echocardiogram.  She was unable to complete the bike stress echo due to knee discomfort.  Initial resting " echocardiogram was unremarkable.    Patient states that the pain is still there in center upper chest.  Dizzy at times.  She is having an achy pain.  Symptoms are off and on. Squeezing pain. Sharp at times.  Some pain with deep breathing.  Pain does radiate at times to her left upper anterior shoulder especially with left arm movement.  Sometimes has pain with pushing on it.   Prednisone did not help.  History of using tramadol for knee and back which helps at times.  Has not tried Tylenol.  Unable to use ibuprofen with history of stomach surgery.  No fevers or chills.  No recent cough or cold symptoms.  No GI or urinary symptoms.  No arm radiation pain or jaw pain.  No vision or hearing concerns.      Past Medical History:   Diagnosis Date     Cannabis abuse, uncomplicated     use found on urine drug screen     Encounter for general adult medical examination without abnormal findings     No Comments Provided     Essential (primary) hypertension          Gastro-esophageal reflux disease without esophagitis     No Comments Provided     Low back pain     No Comments Provided     Morbid (severe) obesity due to excess calories (H)     07,with a body mass index of 51     Other specified anxiety disorders     No Comments Provided     Otitis externa of both ears     Recurrent     Personal history of other medical treatment (CODE)      I, para 1.     Tobacco use     No Comments Provided       Past Surgical History:   Procedure Laterality Date     ADENOIDECTOMY           ANKLE SURGERY           COLONOSCOPY  2014    hyperplastic, 10 year follow up     LAPAROSCOPIC TUBAL LIGATION           OTHER SURGICAL HISTORY      10/86,32750.0,KS CREATE EARDRUM OPENING GEN ANESTH     OTHER SURGICAL HISTORY      ,13396.0,KS CREATE EARDRUM OPENING GEN ANESTH     OTHER SURGICAL HISTORY      ,,OTHER, with left PE tube     OTHER SURGICAL HISTORY      ,519721,OTHER     OTHER SURGICAL HISTORY  "     05/22/07,788948,OTHER, for obesity, Dr. Hogue, [place]       Family History   Problem Relation Age of Onset     Hypertension Mother         Hypertension     Other - See Comments Mother         History of depression     Hypertension Brother         Hypertension       Social History     Tobacco Use     Smoking status: Former Smoker     Packs/day: 0.25     Types: Cigarettes     Smokeless tobacco: Never Used   Substance Use Topics     Alcohol use: No     Frequency: Never       Current Outpatient Medications   Medication Sig Dispense Refill     buPROPion (WELLBUTRIN XL) 150 MG 24 hr tablet Take 1 tablet (150 mg) by mouth every morning 90 tablet 3     cetirizine (ZYRTEC) 10 MG tablet Take 1 tablet (10 mg) by mouth daily (Patient taking differently: Take 10 mg by mouth as needed ) 90 tablet 3     Cholecalciferol (VITAMIN D3) 2000 units CAPS Take 1 tablet by mouth daily 30 capsule 11     cyanocobalamin (CYANOCOBALAMIN) 1000 MCG/ML injection Inject 1 mL (1,000 mcg) into the muscle every 30 days 3 mL 3     EPINEPHrine (EPIPEN/ADRENACLICK/OR ANY BX GENERIC EQUIV) 0.3 MG/0.3ML injection 2-pack Inject 0.3 mLs (0.3 mg) into the muscle as needed for anaphylaxis 0.6 mL 0     FLUoxetine (PROZAC) 40 MG capsule Take 1 capsule (40 mg) by mouth daily 90 capsule 3     levothyroxine (SYNTHROID/LEVOTHROID) 175 MCG tablet Take 1 tablet (175 mcg) by mouth daily 90 tablet 3     lisinopril-hydrochlorothiazide (PRINZIDE/ZESTORETIC) 20-12.5 MG tablet TAKE 2 TABLETS BY MOUTH DAILY 180 tablet 3     LORazepam (ATIVAN) 1 MG tablet Take 1 tablet (1 mg) by mouth every 6 hours as needed for anxiety 30 tablet 3     metroNIDAZOLE (METROGEL) 1 % gel Apply topically daily (Patient taking differently: Apply topically as needed ) 60 g 3     Multiple Vitamin (MULTI-VITAMINS) TABS Take 1 tablet by mouth       order for DME Equipment being ordered: knee brace 1 each 0     syringe/needle, disp, (MEDSAVER SYRINGE) 25G X 1\" 3 ML MISC Inject 1 each into " the muscle every 30 days 12 each 0     traMADol (ULTRAM) 50 MG tablet Take 1 tablet (50 mg) by mouth every 6 hours as needed for severe pain 90 tablet 5     traZODone (DESYREL) 100 MG tablet Take 2 tablets (200 mg) by mouth At Bedtime 180 tablet 3       Allergies   Allergen Reactions     Amoxicillin Other (See Comments)     Pt reports it is ineffective for her personally       REVIEW OF SYSTEMS:  Refer to HPI.    EXAM:   Vitals:    /74 (BP Location: Right arm, Patient Position: Sitting, Cuff Size: Adult Large)   Pulse 76   Temp 98.3  F (36.8  C) (Tympanic)   Resp 20   Wt 122.4 kg (269 lb 12.8 oz)   LMP  (LMP Unknown)   SpO2 98%   Breastfeeding? No   BMI 44.90 kg/m      General Appearance: Pleasant, alert, appropriate appearance for age. No acute distress  Chest/Respiratory Exam: Normal chest wall and respirations. Clear to auscultation.  Cardiovascular Exam: Regular rate and rhythm. S1, S2, no murmur, click, gallop, or rubs.  Musculoskeletal Exam: Back is straight, full ROM of upper and lower extremities.  Mild pain with palpation of the left mid upper chest wall.  Full range of motion of upper extremities without discomfort.  Skin: no rash or abnormalities  Neurologic Exam: Nonfocal, normal gross motor, tone coordination and no tremor.  Psychiatric Exam: Alert and oriented - appropriate affect.    PHQ Depression Screen  PHQ-9 SCORE 4/23/2019 7/30/2019 10/28/2019   PHQ-9 Total Score 4 4 6       ASSESSMENT AND PLAN:      ICD-10-CM    1. Acute costochondritis M94.0    2. Chest pain, unspecified type R07.9 CARDIOLOGY EVAL ADULT REFERRAL   3. Needs flu shot Z23 GH-IMM- FLU VAC PRESRV FREE QUAD SPLIT VIR > 6 MONTHS IM   4. Elevated glucose R73.09 Basic Metabolic Panel     Hemoglobin A1c     Hemoglobin A1c     Basic Metabolic Panel   5. Nonspecific ST-T wave electrocardiographic changes R94.31 CARDIOLOGY EVAL ADULT REFERRAL       Chest wall pain likely due to acute costochondritis.  Encouraged to take  tylenol for relief up to 4 times per day.  Encouraged rest and minimal heavy lifting.  Encouraged to use ice or heat 15 minutes at a time several times per day to decrease pain.  Return to clinic with any change or worsening of symptoms.  Gave warning signs and symptoms.    Due to patient's acute chest pain, nonspecific EKG findings and inability to complete the stress echocardiogram, patient was referred to cardiology for a consult.    Patient had increased glucose in her urine and an elevated glucose with her blood test.  Completed repeat BMP and hemoglobin A1c to rule out concerns.  Results are stable.    Patient was given a flu shot.    Greater than 25 minutes were spent in counseling and coordination of care.     Patient Instructions     Encouraged to take tylenol for relief up to 4 times per day.  Encouraged rest and minimal heavy lifting.  Encouraged to use ice or heat 15 minutes at a time several times per day to decrease pain. Return to clinic in 1-2 weeks as necessary for persistent pain. Return to clinic with any change or worsening of symptoms.       Patient Education     Costochondritis    Costochondritis is inflammation of a rib or the cartilage that connects a rib to your breastbone (sternum). It causes tenderness, and sometimes chest pain may be sharp or aching, or it may feel like pressure. Pain may get worse with deep breathing, movement, or exercise. In some cases, the pain is mistaken for a heart attack. Despite this, the condition is not serious. Read on to learn more about the condition and how it can be treated.  What causes costochondritis?  The cause of costochondritis is not completely clear, but it may happen after a chest injury, chest infection, or coughing episode. Some physical activities can sometimes lead to costochondritis. Large-breasted women may be more likely to have the condition. Often, the reason for the inflammation is unknown.  Diagnosing costochondritis  There is no test  for costochondritis. The condition is diagnosed by the symptoms you have. Your healthcare provider will perform a physical exam. He or she will ask you about your symptoms and examine your chest for tenderness. In some cases, tests are done to rule out more serious problems. These tests may include imaging tests such as chest X-ray, CT scan, or an ECG.  Treating costochondritis  If an underlying cause is found, treatment for that will likely relieve the problem. Costochondritis often goes away on its own. The course of the condition varies from person to person. It usually lasts from weeks to months. In some cases, mild symptoms continue for months to years. To ease symptoms:    Take medicine as directed. These relieve pain and swelling. Ibuprofen or other NSAIDs are often recommended. In some cases, you may be given prescription medicine, such as muscle relaxants.    Avoid activities that put stress on the chest or spine.    Apply a heating pad (set to warm, not too high, heat) to the breastbone several times a day.    Perform stretching exercises as directed.  Call the healthcare provider right away if you have any of the following:    Pain that is not relieved by medicine    Shortness of breath    Lightheadedness, dizziness, or fainting    Feeling of irregular heartbeat or fast pulse  Anyone with chest pain should see a healthcare provider, especially those who are older and may be at risk for heart disease.   Date Last Reviewed: 10/1/2016    8853-8510 [employer]. [address] [place], [place] [id number]. All rights reserved. This information is not intended as a substitute for professional medical care. Always follow your healthcare professional's instructions.           Patient Education     Chest Wall Pain: Costochondritis    The chest pain that you have had today is caused by costochondritis. This condition is caused by an inflammation of the cartilage joining your ribs to your breastbone. It  is not caused by heart or lung problems. Your healthcare team has made sure that the chest pain you feel is not from a life threatening cause of chest pain such as heart attack, collapsed lung, blood clot in the lung, tear in the aorta, or esophageal rupture. The inflammation may have been brought on by a blow to the chest, lifting heavy objects, intense exercise, or an illness that made you cough and sneeze a lot. It often occurs during times of emotional stress. It can be painful, but it is not dangerous. It usually goes away in 1 to 2 weeks. But it may happen again. Rarely, a more serious condition may cause symptoms similar to costochondritis. That s why it s important to watch for the warning signs listed below.  Home care  Follow these guidelines when caring for yourself at home:    If you feel that emotional stress is a cause of your condition, try to figure out the sources of that stress. It may not be obvious. Learn ways to deal with the stress in your life. This can include regular exercise, muscle relaxation, meditation, or simply taking time out for yourself.    You may use acetaminophen, ibuprofen, or naproxen to control pain, unless another pain medicine was prescribed. If you have liver or kidney disease or ever had a stomach ulcer, talk with your healthcare provider before using these medicines.    You can also help ease pain by using a hot, wet compress or heating pad. Use this with or without a medicated skin cream that helps relieves pain.    Do stretching exercise as advised by your provider.    Take any prescribed medicines as directed.  Follow-up care  Follow up with your healthcare provider, or as advised, if you do not start to get better in the next 2 days.  When to seek medical advice  Call your healthcare provider right away if any of these occur:    A change in the type of pain. Call if it feels different, becomes more serious, lasts longer, or spreads into your shoulder, arm, neck, jaw,  or back.    Shortness of breath or pain gets worse when you breathe    Weakness, dizziness, or fainting    Cough with dark-colored sputum (phlegm) or blood    Abdominal pain    Dark red or black stools    Fever of 100.4 F (38 C) or higher, or as directed by your healthcare provider  Date Last Reviewed: 12/1/2016 2000-2018 [employer]. [address] [place] [address]. All rights reserved. This information is not intended as a substitute for professional medical care. Always follow your healthcare professional's instructions.                Ange Funes PA-C PA-C..................10/28/2019 12:01 PM

## 2019-10-28 NOTE — NURSING NOTE
Patient is here for ER follow up for chest pains.   Marilee Ortiz LPN .............10/28/2019     1:18 PM      No LMP recorded (lmp unknown). Patient has had an ablation.  Medication Reconciliation: complete    Marilee Ortiz LPN  10/28/2019 1:23 PM      Immunization Documentation    Prior to Immunization administration, verified patients identity using patient's name and date of birth. Please see IMMUNIZATIONS  and order for additional information.  Patient / Parent instructed to remain in clinic for 15 minutes and report any adverse reaction to staff immediately.    Was entire vial of medication used? Yes  Vial/Syringe: Syringe    Marilee Ortiz LPN  10/28/2019

## 2019-10-29 ASSESSMENT — ANXIETY QUESTIONNAIRES: GAD7 TOTAL SCORE: 6

## 2019-10-31 ENCOUNTER — OFFICE VISIT (OUTPATIENT)
Dept: CARDIOLOGY | Facility: OTHER | Age: 39
End: 2019-10-31
Attending: PHYSICIAN ASSISTANT
Payer: COMMERCIAL

## 2019-10-31 VITALS
TEMPERATURE: 97.2 F | HEIGHT: 65 IN | BODY MASS INDEX: 44.15 KG/M2 | RESPIRATION RATE: 18 BRPM | SYSTOLIC BLOOD PRESSURE: 100 MMHG | WEIGHT: 265 LBS | DIASTOLIC BLOOD PRESSURE: 62 MMHG | HEART RATE: 80 BPM | OXYGEN SATURATION: 98 %

## 2019-10-31 DIAGNOSIS — Z72.0 TOBACCO ABUSE: ICD-10-CM

## 2019-10-31 DIAGNOSIS — E66.01 MORBID OBESITY (H): ICD-10-CM

## 2019-10-31 DIAGNOSIS — Z71.6 TOBACCO ABUSE COUNSELING: ICD-10-CM

## 2019-10-31 DIAGNOSIS — R94.31 NONSPECIFIC ST-T WAVE ELECTROCARDIOGRAPHIC CHANGES: ICD-10-CM

## 2019-10-31 DIAGNOSIS — R07.9 CHEST PAIN, UNSPECIFIED TYPE: Primary | ICD-10-CM

## 2019-10-31 DIAGNOSIS — I10 ESSENTIAL HYPERTENSION: ICD-10-CM

## 2019-10-31 PROCEDURE — 93000 ELECTROCARDIOGRAM COMPLETE: CPT | Performed by: INTERNAL MEDICINE

## 2019-10-31 PROCEDURE — 99203 OFFICE O/P NEW LOW 30 MIN: CPT | Performed by: INTERNAL MEDICINE

## 2019-10-31 ASSESSMENT — ANXIETY QUESTIONNAIRES
3. WORRYING TOO MUCH ABOUT DIFFERENT THINGS: SEVERAL DAYS
2. NOT BEING ABLE TO STOP OR CONTROL WORRYING: SEVERAL DAYS
1. FEELING NERVOUS, ANXIOUS, OR ON EDGE: SEVERAL DAYS
GAD7 TOTAL SCORE: 5
6. BECOMING EASILY ANNOYED OR IRRITABLE: SEVERAL DAYS
7. FEELING AFRAID AS IF SOMETHING AWFUL MIGHT HAPPEN: SEVERAL DAYS
5. BEING SO RESTLESS THAT IT IS HARD TO SIT STILL: NOT AT ALL
IF YOU CHECKED OFF ANY PROBLEMS ON THIS QUESTIONNAIRE, HOW DIFFICULT HAVE THESE PROBLEMS MADE IT FOR YOU TO DO YOUR WORK, TAKE CARE OF THINGS AT HOME, OR GET ALONG WITH OTHER PEOPLE: SOMEWHAT DIFFICULT

## 2019-10-31 ASSESSMENT — PATIENT HEALTH QUESTIONNAIRE - PHQ9
SUM OF ALL RESPONSES TO PHQ QUESTIONS 1-9: 6
5. POOR APPETITE OR OVEREATING: NOT AT ALL

## 2019-10-31 ASSESSMENT — PAIN SCALES - GENERAL: PAINLEVEL: MILD PAIN (3)

## 2019-10-31 ASSESSMENT — MIFFLIN-ST. JEOR: SCORE: 1887.29

## 2019-10-31 NOTE — NURSING NOTE
"Patient comes in for consult on chest pain.  Mariela Link LPN ....................10/31/2019   12:51 PM  Chief Complaint   Patient presents with     Consult For     chest pain       Initial /62 (BP Location: Right arm, Patient Position: Sitting, Cuff Size: Adult Large)   Pulse 80   Temp 97.2  F (36.2  C) (Tympanic)   Resp 18   Ht 1.65 m (5' 4.96\")   Wt 120.2 kg (265 lb)   LMP  (LMP Unknown)   SpO2 98%   BMI 44.15 kg/m   Estimated body mass index is 44.15 kg/m  as calculated from the following:    Height as of this encounter: 1.65 m (5' 4.96\").    Weight as of this encounter: 120.2 kg (265 lb).  Meds Reconciled: complete  Pt is not on Aspirin  Pt is not on a Statin  PHQ and/or TERESA reviewed. Pt referred to PCP/MH Provider as appropriate.    Mariela Link LPN      "

## 2019-10-31 NOTE — PROGRESS NOTES
[facility] HEART CARE   CARDIOLOGY CONSULT     Laverne Nguyen   1980  [id number]    Alin Pimentel     Chief Complaint   Patient presents with     Consult For     chest pain          HPI:   Mrs. Nguyen is a 39-year-old female who is being seen by cardiology for chest pain.  She has a history of obesity, sedentary lifestyle, tobacco abuse, marijuana usage, and hypertension.    For the last 2 weeks, she describes chest discomfort.  She describes 2 different types of pain.  One is sharp and the other is pressure-like.  Her symptoms are atypical as they can last hours and are inconsistent with activity. Her symptoms are brought on with both with activity as well as rest.  Her symptoms radiate to her left shoulder and at times between her shoulder blades.  Her risk factors for heart disease include hypertension, obesity, sedentary lifestyle, and poor diet.  She denies history of diabetes, hyperlipidemia or family history of heart disease.  He was appropriately set up for stress test which completed on 10/25/2019.  She was only able to perform 4 METS exercising for 6 minutes and 40 seconds.  Peak heart rate was 120 bpm with a peak blood pressure of 164/80.  There were no EKG changes throughout the test.  The imaging portions were normal.  These findings include a normal EF of 55% to 60%, no evidence of wall motion abnormalities, no valvular issues, and normal aortic root.  Testing was discontinued secondary to leg fatigue.    Her symptoms are atypical.  We discussed repeating the test with dobutamine.  However, secondary to cost, she was somewhat hesitant.  I think her risk of having heart disease is low based on age, female gender, and limit her risk factors.  Her symptoms are not affected by food or palpation.  She may get discomfort with moving.  She has not had a rash.  She has not been tender to her right upper quadrant surrounding the gallbladder.  She has not been coughing, with fevers, or short of breath.  She  has not had diarrhea, dark stools, or black stools.  She has not been throwing up.  Essentially her review of systems is negative.  She has been taking Tylenol and tramadol for her discomfort, improving it mildly.  She does take tramadol infrequently for chronic joint pains    IMAGING RESULTS:   Stress echo from 10/25/2019:  Exercise was stopped due to patient request (The patient exhibited leg pain  during exercise). Only baseline images were obtained. Non diagnostic test.     Normal biventricular size, thickness, and global systolic function at  baseline, LVEF=55-60%.  No regional wall motion abnormalities are present at rest.  No angina was elicited.  The patient exhibited leg pain during exercise.  ECG not available to review.  No significant valvular abnormalities are noted on screening Doppler exam.  The aortic root and visualized ascending aorta are normal.    CT chest for pulmonary embolism on 10/19/2019:  IMPRESSION:   1. No evidence of pulmonary embolus to the segmental level.   2. No aneurysm of the aorta.   3. No dissection of the aorta.     ALLERGIES:   Allergies   Allergen Reactions     Amoxicillin Other (See Comments)     Pt reports it is ineffective for her personally        PAST MEDICAL HISTORY:   Past Medical History:   Diagnosis Date     Cannabis abuse, uncomplicated     use found on urine drug screen     Encounter for general adult medical examination without abnormal findings     No Comments Provided     Essential (primary) hypertension     2004     Gastro-esophageal reflux disease without esophagitis     No Comments Provided     Low back pain     No Comments Provided     Morbid (severe) obesity due to excess calories (H)     07,with a body mass index of 51     Other specified anxiety disorders     No Comments Provided     Otitis externa of both ears     Recurrent     Personal history of other medical treatment (CODE)      I, para 1.     Tobacco use     No Comments Provided         PAST SURGICAL HISTORY:   Past Surgical History:   Procedure Laterality Date     ADENOIDECTOMY      03/87     ANKLE SURGERY      8/14     COLONOSCOPY  01/09/2014    hyperplastic, 10 year follow up     LAPAROSCOPIC TUBAL LIGATION      2009     OTHER SURGICAL HISTORY      10/86,03356.0,MT CREATE EARDRUM OPENING GEN ANESTH     OTHER SURGICAL HISTORY      1980,25756.0,MT CREATE EARDRUM OPENING GEN ANESTH     OTHER SURGICAL HISTORY      1992,956974,OTHER, with left PE tube     OTHER SURGICAL HISTORY      01/94,228156,OTHER     OTHER SURGICAL HISTORY      05/22/07,702089,OTHER, for obesity, Dr. Hogue, [place]        FAMILY HISTORY:   Family History   Problem Relation Age of Onset     Hypertension Mother         Hypertension     Other - See Comments Mother         History of depression     Hypertension Brother         Hypertension        SOCIAL HISTORY:   Social History     Socioeconomic History     Marital status:      Spouse name: Not on file     Number of children: Not on file     Years of education: Not on file     Highest education level: Not on file   Occupational History     Not on file   Social Needs     Financial resource strain: Not on file     Food insecurity:     Worry: Not on file     Inability: Not on file     Transportation needs:     Medical: Not on file     Non-medical: Not on file   Tobacco Use     Smoking status: Former Smoker     Packs/day: 0.25     Types: Cigarettes     Smokeless tobacco: Never Used   Substance and Sexual Activity     Alcohol use: No     Frequency: Never     Drug use: No     Sexual activity: Yes     Partners: Male     Birth control/protection: Surgical   Lifestyle     Physical activity:     Days per week: Not on file     Minutes per session: Not on file     Stress: Not on file   Relationships     Social connections:     Talks on phone: Not on file     Gets together: Not on file     Attends [employer] service: Not on file     Active member of club or organization: Not on  file     Attends meetings of clubs or organizations: Not on file     Relationship status: Not on file     Intimate partner violence:     Fear of current or ex partner: Not on file     Emotionally abused: Not on file     Physically abused: Not on file     Forced sexual activity: Not on file   Other Topics Concern     Parent/sibling w/ CABG, MI or angioplasty before 65F 55M? 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       to [facility], 2010    1 Daughter, Anjali, 2/12/2005    1 Step daughter         CURRENT MEDICATIONS:   Prior to Admission medications    Medication Sig Start Date End Date Taking? Authorizing Provider   buPROPion (WELLBUTRIN XL) 150 MG 24 hr tablet Take 1 tablet (150 mg) by mouth every morning 1/22/19 1/22/20  Alin Pimentel MD   cetirizine (ZYRTEC) 10 MG tablet Take 1 tablet (10 mg) by mouth daily  Patient taking differently: Take 10 mg by mouth as needed  6/28/18   Alin Pimentel MD   Cholecalciferol (VITAMIN D3) 2000 units CAPS Take 1 tablet by mouth daily 6/28/18   Alin Pimentel MD   cyanocobalamin (CYANOCOBALAMIN) 1000 MCG/ML injection Inject 1 mL (1,000 mcg) into the muscle every 30 days 7/30/19   Alin Pimentel MD   EPINEPHrine (EPIPEN/ADRENACLICK/OR ANY BX GENERIC EQUIV) 0.3 MG/0.3ML injection 2-pack Inject 0.3 mLs (0.3 mg) into the muscle as needed for anaphylaxis 6/28/18   Alin Pimentel MD   FLUoxetine (PROZAC) 40 MG capsule Take 1 capsule (40 mg) by mouth daily 4/23/19   Alin Pimentel MD   levothyroxine (SYNTHROID/LEVOTHROID) 175 MCG tablet Take 1 tablet (175 mcg) by mouth daily 1/10/19 1/10/20  Alin Pimentel MD   lisinopril-hydrochlorothiazide (PRINZIDE/ZESTORETIC) 20-12.5 MG tablet TAKE 2 TABLETS BY MOUTH DAILY 6/4/19   Alin Pimentel MD   LORazepam (ATIVAN) 1 MG tablet Take 1 tablet (1 mg) by mouth every 6 hours as needed for anxiety 4/23/19   Alin Pimentel MD   metroNIDAZOLE (METROGEL) 1 % gel Apply topically daily  Patient taking  "differently: Apply topically as needed  6/28/18   Alin Pimentel MD   Multiple Vitamin (MULTI-VITAMINS) TABS Take 1 tablet by mouth 6/28/18   Alin Pimentel MD   order for DME Equipment being ordered: knee brace 11/4/18   Giovana Chew APRN CNP   syringe/needle, disp, (MEDSAVER SYRINGE) 25G X 1\" 3 ML MISC Inject 1 each into the muscle every 30 days 6/28/18   Alin Pimentel MD   traMADol (ULTRAM) 50 MG tablet Take 1 tablet (50 mg) by mouth every 6 hours as needed for severe pain 7/3/19   Alin Pimentel MD   traZODone (DESYREL) 100 MG tablet Take 2 tablets (200 mg) by mouth At Bedtime 1/10/19   Alin Pimentel MD          ROS:   CONSTITUTIONAL: No weight loss, fever, chills, weakness or fatigue.   HEENT: Eyes: No visual changes. Ears, Nose, Throat: No hearing loss, congestion or difficulty swallowing.   CARDIOVASCULAR: (+) chest pain, chest pressure and chest discomfort. No palpitations or lower extremity edema.   RESPIRATORY: Infrequent shortness of breath with dyspnea upon exertion, no cough or sputum production.   GASTROINTESTINAL: No abdominal pain. No anorexia, nausea, vomiting or diarrhea.   NEUROLOGICAL: No headache, lightheadedness, dizziness, syncope, ataxia or weakness.   HEMATOLOGIC: No anemia, bleeding or bruising.   PSYCHIATRIC: No history of depression or anxiety.   ENDOCRINOLOGIC: No reports of sweating, cold or heat intolerance. No polyuria or polydipsia.   SKIN: No abnormal rashes or itching.       PHYSICAL EXAM:   GENERAL: The patient is a well-developed, well-nourished, in no apparent distress. Alert and oriented x3.   HEENT: Head is normocephalic and atraumatic. Eyes are symmetrical with normal visual tracking.  HEART: Regular rate and rhythm, S1S2 present without murmur, rub or gallop.   LUNGS: Respirations regular and unlabored. Clear to auscultation.   GI: Abdomen is soft and nondistended.   EXTREMITIES: No peripheral edema present.   MUSCULOSKELETAL: No joint swelling.   NEUROLOGIC: Alert " and oriented X3.    SKIN: No jaundice. No rashes or visible skin lesions present.         LAB RESULTS:   Office Visit on 10/28/2019   Component Date Value Ref Range Status     Hemoglobin A1C 10/28/2019 5.2  4.0 - 6.0 % Final     Sodium 10/28/2019 135  134 - 144 mmol/L Final     Potassium 10/28/2019 4.6  3.5 - 5.1 mmol/L Final     Chloride 10/28/2019 100  98 - 107 mmol/L Final     Carbon Dioxide 10/28/2019 28  21 - 31 mmol/L Final     Anion Gap 10/28/2019 7  3 - 14 mmol/L Final     Glucose 10/28/2019 106* 70 - 105 mg/dL Final     Urea Nitrogen 10/28/2019 14  7 - 25 mg/dL Final     Creatinine 10/28/2019 0.75  0.60 - 1.20 mg/dL Final     GFR Estimate 10/28/2019 87  >60 mL/min/[1.73_m2] Final     GFR Estimate If Black 10/28/2019 >90  >60 mL/min/[1.73_m2] Final     Calcium 10/28/2019 9.7  8.6 - 10.3 mg/dL Final            ASSESSMENT:       ICD-10-CM    1. Chest pain, unspecified type R07.9 EKG 12-lead, tracing only (Same Day)   2. Nonspecific ST-T wave electrocardiographic changes R94.31 EKG 12-lead, tracing only (Same Day)   3. Morbid obesity (H) E66.01    4. Tobacco abuse Z72.0    5. Tobacco abuse counseling Z71.6    6. Essential hypertension I10          PLAN:   1.  We discussed her stress test from 10/25/2019.  Her chest test was normal.  Her symptoms are atypical for cardiac disease.  We discussed redoing the stress test with dobutamine.  However, secondary to atypical symptoms, limited risk factor, and age, it was decided to forego on repeat stress testing with dobutamine.  She has been concerned about the cost.  She understands that if her symptoms get worse, she should be seen in cardiology in follow-up.  2.  It was suggest that she take Tylenol for discomfort.  She is not able to take NSAIDs  including aspirin secondary to gastric bypass.  3.  She understands if her symptoms get worse, she should be seen in the ER or be seen in the clinic in follow-up.  4.  Follow-up in the future will be on an as-needed  basis.      Thank you for allowing me to participate in the care of your patient. Please do not hesitate to contact me if you have any questions.     Vladimir Jean, DO

## 2019-11-02 ASSESSMENT — ANXIETY QUESTIONNAIRES: GAD7 TOTAL SCORE: 5

## 2019-11-03 DIAGNOSIS — F33.0 MAJOR DEPRESSIVE DISORDER, RECURRENT, MILD (H): ICD-10-CM

## 2019-11-06 RX ORDER — LORAZEPAM 1 MG/1
TABLET ORAL
Qty: 30 TABLET | Refills: 0 | Status: SHIPPED | OUTPATIENT
Start: 2019-11-06 | End: 2020-01-22

## 2019-11-06 NOTE — TELEPHONE ENCOUNTER
Routing refill request to provider for review/approval because:  Drug not on the [facility] refill protocol   LOV: 10/28/19  Mercedes Brown RN on 11/6/2019 at 10:35 AM

## 2020-01-15 DIAGNOSIS — F33.0 MAJOR DEPRESSIVE DISORDER, RECURRENT, MILD (H): Primary | ICD-10-CM

## 2020-01-17 RX ORDER — BUPROPION HYDROCHLORIDE 150 MG/1
TABLET ORAL
Qty: 90 TABLET | Refills: 3 | Status: SHIPPED | OUTPATIENT
Start: 2020-01-17 | End: 2021-01-21

## 2020-01-17 NOTE — TELEPHONE ENCOUNTER
Senia BEJARANO sent Rx request for the following:      BUPROPION XL 150MG TABLETS (24 H)   Sig: TAKE 1 TABLET(150 MG) BY MOUTH EVERY MORNING   Last Prescription Date:   1/22/19  Last Fill Qty/Refills:         90, R-3 (End: 1/22/2020)    Last Office Visit:              10/28/19  Future Office visit:             Next 5 appointments (look out 90 days)    Jan 22, 2020 11:30 AM CST  Office Visit with Alin Pimentel MD  [facility] ([facility]) [address]  [place] [id number] [phone]        Noted upcoming appointment. Will route to PCP, for consideration of 90-day refill. Unable to complete prescription refill per RN Medication Refill Policy. Valorie Castorena RN .............. 1/17/2020  10:00 AM

## 2020-01-17 NOTE — TELEPHONE ENCOUNTER
Redundant refill request refused: Too soon:    FLUoxetine (PROZAC) 40 MG capsule 90 capsule 3 4/23/2019  --   Sig - Route: Take 1 capsule (40 mg) by mouth daily - Oral   Sent to pharmacy as: FLUoxetine (PROZAC) 40 MG capsule   Class: E-Prescribe   Order: [id number]   E-Prescribing Status: Receipt confirmed by pharmacy (4/23/2019  1:54 PM CDT)     [id number] - [place], [place] - [address]     Unable to complete prescription refill per RN Medication Refill Policy. Valorie Castorena RN .............. 1/17/2020  9:56 AM

## 2020-01-22 ENCOUNTER — OFFICE VISIT (OUTPATIENT)
Dept: FAMILY MEDICINE | Facility: OTHER | Age: 40
End: 2020-01-22
Attending: FAMILY MEDICINE
Payer: COMMERCIAL

## 2020-01-22 VITALS
HEART RATE: 72 BPM | SYSTOLIC BLOOD PRESSURE: 100 MMHG | TEMPERATURE: 98.3 F | BODY MASS INDEX: 43.35 KG/M2 | OXYGEN SATURATION: 98 % | WEIGHT: 260.2 LBS | RESPIRATION RATE: 16 BRPM | DIASTOLIC BLOOD PRESSURE: 62 MMHG

## 2020-01-22 DIAGNOSIS — M22.2X2 PATELLOFEMORAL PAIN SYNDROME OF LEFT KNEE: ICD-10-CM

## 2020-01-22 DIAGNOSIS — E03.8 OTHER SPECIFIED HYPOTHYROIDISM: ICD-10-CM

## 2020-01-22 DIAGNOSIS — M25.561 PATELLOFEMORAL ARTHRALGIA OF RIGHT KNEE: ICD-10-CM

## 2020-01-22 DIAGNOSIS — S33.5XXA LUMBAR SPRAIN, INITIAL ENCOUNTER: ICD-10-CM

## 2020-01-22 DIAGNOSIS — F33.0 MAJOR DEPRESSIVE DISORDER, RECURRENT, MILD (H): Primary | ICD-10-CM

## 2020-01-22 LAB — TSH SERPL DL<=0.05 MIU/L-ACNC: 1.35 IU/ML (ref 0.34–5.6)

## 2020-01-22 PROCEDURE — 20610 DRAIN/INJ JOINT/BURSA W/O US: CPT | Performed by: FAMILY MEDICINE

## 2020-01-22 PROCEDURE — 84443 ASSAY THYROID STIM HORMONE: CPT | Mod: ZL | Performed by: FAMILY MEDICINE

## 2020-01-22 PROCEDURE — 36415 COLL VENOUS BLD VENIPUNCTURE: CPT | Mod: ZL | Performed by: FAMILY MEDICINE

## 2020-01-22 PROCEDURE — 99214 OFFICE O/P EST MOD 30 MIN: CPT | Mod: 25 | Performed by: FAMILY MEDICINE

## 2020-01-22 PROCEDURE — 25000128 H RX IP 250 OP 636: Performed by: FAMILY MEDICINE

## 2020-01-22 RX ORDER — TRAMADOL HYDROCHLORIDE 50 MG/1
50 TABLET ORAL EVERY 6 HOURS PRN
Qty: 90 TABLET | Refills: 5 | Status: SHIPPED | OUTPATIENT
Start: 2020-01-22 | End: 2020-03-24

## 2020-01-22 RX ORDER — METHYLPREDNISOLONE ACETATE 80 MG/ML
80 INJECTION, SUSPENSION INTRA-ARTICULAR; INTRALESIONAL; INTRAMUSCULAR; SOFT TISSUE ONCE
Status: COMPLETED | OUTPATIENT
Start: 2020-01-22 | End: 2020-01-22

## 2020-01-22 RX ORDER — LORAZEPAM 1 MG/1
1 TABLET ORAL EVERY 6 HOURS
Qty: 30 TABLET | Refills: 5 | Status: SHIPPED | OUTPATIENT
Start: 2020-01-22 | End: 2020-08-25

## 2020-01-22 RX ADMIN — METHYLPREDNISOLONE ACETATE 80 MG: 80 INJECTION, SUSPENSION INTRA-ARTICULAR; INTRALESIONAL; INTRAMUSCULAR; SOFT TISSUE at 12:21

## 2020-01-22 RX ADMIN — METHYLPREDNISOLONE ACETATE 80 MG: 80 INJECTION, SUSPENSION INTRA-ARTICULAR; INTRALESIONAL; INTRAMUSCULAR; SOFT TISSUE at 12:22

## 2020-01-22 ASSESSMENT — PAIN SCALES - GENERAL: PAINLEVEL: MODERATE PAIN (4)

## 2020-01-22 ASSESSMENT — ENCOUNTER SYMPTOMS
SHORTNESS OF BREATH: 0
DYSPHORIC MOOD: 1
SLEEP DISTURBANCE: 1
FATIGUE: 0
HEADACHES: 0
BACK PAIN: 1
FEVER: 0
NERVOUS/ANXIOUS: 1
ARTHRALGIAS: 1

## 2020-01-22 NOTE — NURSING NOTE
"Coming in to talk about medications    Time out done with name date of birth and what is being injected    Chief Complaint   Patient presents with     Imm/Inj     bilateral knees, talk about medications, depression, sleep and pain       Initial /62   Pulse 72   Temp 98.3  F (36.8  C) (Tympanic)   Resp 16   Wt 118 kg (260 lb 3.2 oz)   SpO2 98%   Breastfeeding No   BMI 43.35 kg/m   Estimated body mass index is 43.35 kg/m  as calculated from the following:    Height as of 10/31/19: 1.65 m (5' 4.96\").    Weight as of this encounter: 118 kg (260 lb 3.2 oz).  Medication Reconciliation: complete    Yadira Warner LPN  "

## 2020-01-22 NOTE — PROGRESS NOTES
SUBJECTIVE:   Laverne Nguyen is a 39 year old female who presents to clinic today for the following health issues:    HPI  Med follow up, repeat knee steroid injections.  Lot of issues but last shots were 6 months ago and lasted for nearly the full 6 months.      Has a new job.  On her feet more but not really having more knee pain.  Sleep is poor. Last night used 300 milligram trazodone, still got only 2 hours.  Very anxious, worries a lot.  Uses ativan for sleep, about 4/7 nights a week.  Not using them often in the day time.  Makes her too sedated.  Is doing community service, for probation from a DWI. Will do 240 hours of this instead of shelter time. Had a chem dep analysis.  Advised 10 hours of CD education. Is now getting random testing.  Has a breathalyzer in her car. This was her first DWI, was at 0.21.  She has never done counseling, but has been told to do this by her PO, for her anxiety and depression.        Is due for her TSH, last was 1 year ago.    Patient Active Problem List    Diagnosis Date Noted     Chest pain, unspecified type 10/31/2019     Priority: Medium     Nonspecific ST-T wave electrocardiographic changes 10/31/2019     Priority: Medium     Tobacco abuse counseling 10/31/2019     Priority: Medium     Patellofemoral arthralgia of right knee 07/30/2019     Priority: Medium     Tobacco abuse 03/07/2018     Priority: Medium     Morbid obesity (H) 03/07/2018     Priority: Medium     Overview:   2007, BMI 51       Essential hypertension 03/07/2018     Priority: Medium     Primary insomnia 02/28/2018     Priority: Medium     Lumbar facet joint syndrome 01/05/2018     Priority: Medium     Major depressive disorder, recurrent, mild (H) 01/02/2015     Priority: Medium     Pain medication agreement 11/20/2014     Priority: Medium     Overview:   Updated 5/6/2016       Degenerative arthritis of knee 11/10/2014     Priority: Medium     Avulsion fracture of ankle, right, closed, initial encounter  08/04/2014     Priority: Medium     Patellofemoral pain syndrome of left knee 08/04/2014     Priority: Medium     Rectal bleeding 01/09/2014     Priority: Medium     Otosclerosis 05/16/2013     Priority: Medium     Anxiety state 03/29/2013     Priority: Medium     Overview:   IMO Update       History of gastric bypass 03/29/2013     Priority: Medium     Idiopathic angioedema 11/29/2012     Priority: Medium     Other specified hypothyroidism 10/12/2010     Priority: Medium     B12 deficiency 09/15/2010     Priority: Medium     Overview:   secondary to gastric bypass       Past Surgical History:   Procedure Laterality Date     ADENOIDECTOMY      03/87     ANKLE SURGERY      8/14     COLONOSCOPY  01/09/2014    hyperplastic, 10 year follow up     LAPAROSCOPIC TUBAL LIGATION      2009     OTHER SURGICAL HISTORY      10/86,50307.0,VA CREATE EARDRUM OPENING GEN ANESTH     OTHER SURGICAL HISTORY      1980,29029.0,VA CREATE EARDRUM OPENING GEN ANESTH     OTHER SURGICAL HISTORY      1992,723698,OTHER, with left PE tube     OTHER SURGICAL HISTORY      01/94,564274,OTHER     OTHER SURGICAL HISTORY      05/22/07,105927,OTHER, for obesity, Dr. Hogue [place]     Social History     Tobacco Use     Smoking status: Former Smoker     Packs/day: 0.25     Types: Cigarettes     Smokeless tobacco: Never Used   Substance Use Topics     Alcohol use: No     Frequency: Never     Current Outpatient Medications   Medication Sig Dispense Refill     buPROPion (WELLBUTRIN XL) 150 MG 24 hr tablet TAKE 1 TABLET(150 MG) BY MOUTH EVERY MORNING 90 tablet 3     cetirizine (ZYRTEC) 10 MG tablet Take 1 tablet (10 mg) by mouth daily (Patient taking differently: Take 10 mg by mouth as needed ) 90 tablet 3     Cholecalciferol (VITAMIN D3) 2000 units CAPS Take 1 tablet by mouth daily 30 capsule 11     cyanocobalamin (CYANOCOBALAMIN) 1000 MCG/ML injection Inject 1 mL (1,000 mcg) into the muscle every 30 days 3 mL 3     EPINEPHrine (EPIPEN/ADRENACLICK/OR  "ANY BX GENERIC EQUIV) 0.3 MG/0.3ML injection 2-pack Inject 0.3 mLs (0.3 mg) into the muscle as needed for anaphylaxis 0.6 mL 0     FLUoxetine (PROZAC) 40 MG capsule Take 1 capsule (40 mg) by mouth daily 90 capsule 3     levothyroxine (SYNTHROID/LEVOTHROID) 175 MCG tablet TAKE 1 TABLET(175 MCG) BY MOUTH DAILY 90 tablet 0     lisinopril-hydrochlorothiazide (PRINZIDE/ZESTORETIC) 20-12.5 MG tablet TAKE 2 TABLETS BY MOUTH DAILY 180 tablet 3     LORazepam (ATIVAN) 1 MG tablet Take 1 tablet (1 mg) by mouth every 6 hours 30 tablet 5     metroNIDAZOLE (METROGEL) 1 % gel Apply topically daily (Patient taking differently: Apply topically as needed ) 60 g 3     Multiple Vitamin (MULTI-VITAMINS) TABS Take 1 tablet by mouth       order for DME Equipment being ordered: knee brace 1 each 0     syringe/needle, disp, (MEDSAVER SYRINGE) 25G X 1\" 3 ML MISC Inject 1 each into the muscle every 30 days 12 each 0     traMADol (ULTRAM) 50 MG tablet Take 1 tablet (50 mg) by mouth every 6 hours as needed for severe pain 90 tablet 5     traZODone (DESYREL) 100 MG tablet Take 2 tablets (200 mg) by mouth At Bedtime 180 tablet 3     Allergies   Allergen Reactions     Amoxicillin Other (See Comments)     Pt reports it is ineffective for her personally       Review of Systems   Constitutional: Negative for fatigue and fever.   Respiratory: Negative for shortness of breath.    Cardiovascular: Negative for chest pain.   Musculoskeletal: Positive for arthralgias and back pain.   Neurological: Negative for headaches.   Psychiatric/Behavioral: Positive for dysphoric mood and sleep disturbance. The patient is nervous/anxious.         OBJECTIVE:     /62   Pulse 72   Temp 98.3  F (36.8  C) (Tympanic)   Resp 16   Wt 118 kg (260 lb 3.2 oz)   SpO2 98%   Breastfeeding No   BMI 43.35 kg/m    Body mass index is 43.35 kg/m .  Physical Exam  Constitutional:       Appearance: Normal appearance.   Musculoskeletal:      Comments: Bilateral knees without " erythema, no effusions.  Discussed with her risks of injections, she consented. Prepped both and each infiltrated with 4 ml 1% lidocaine and 80 milligram depotmedrol     Skin:     General: Skin is warm and dry.   Neurological:      General: No focal deficit present.      Mental Status: She is alert and oriented to person, place, and time.   Psychiatric:         Mood and Affect: Mood normal.         Behavior: Behavior normal.         Thought Content: Thought content normal.         Diagnostic Test Results:  none     ASSESSMENT/PLAN:         (F33.0) Major depressive disorder, recurrent, mild (H)  (primary encounter diagnosis)  Comment: she has some significant anxiety issues, and realistically I would like her to see a counselor.  Again, remains resistant to this.  No actual chem dep, ut now with the DWI abuse is a concern.  I did reill the ativan and tramadol, discussed with her the low but potential risk for abuse/addiction with these.  Plan: LORazepam (ATIVAN) 1 MG tablet             (S33.5XXA) Lumbar sprain, initial encounter  Comment: stable  Plan: traMADol (ULTRAM) 50 MG tablet        Refilled, this is used for her chronic knee pains too    (M22.2X2) Patellofemoral pain syndrome of left knee  Comment:  recurrent  Plan: methylPREDNISolone (DEPO-MEDROL) injection 80         mg, DRAIN/INJECT LARGE JOINT/BURSA [20610]             (M25.561) Patellofemoral arthralgia of right knee  Comment: recurrent  Plan: methylPREDNISolone (DEPO-MEDROL) injection 80         mg, DRAIN/INJECT LARGE JOINT/BURSA [20610]             (E03.8) Other specified hypothyroidism  Comment: stable  Plan: TSH                 Alin Pimentel MD  [employer]

## 2020-01-24 ENCOUNTER — OFFICE VISIT (OUTPATIENT)
Dept: FAMILY MEDICINE | Facility: OTHER | Age: 40
End: 2020-01-24
Attending: FAMILY MEDICINE
Payer: COMMERCIAL

## 2020-01-24 VITALS
DIASTOLIC BLOOD PRESSURE: 78 MMHG | RESPIRATION RATE: 18 BRPM | SYSTOLIC BLOOD PRESSURE: 126 MMHG | TEMPERATURE: 98.6 F | OXYGEN SATURATION: 98 % | HEART RATE: 74 BPM

## 2020-01-24 DIAGNOSIS — M25.561 ACUTE PAIN OF RIGHT KNEE: Primary | ICD-10-CM

## 2020-01-24 PROBLEM — R07.9 CHEST PAIN, UNSPECIFIED TYPE: Status: RESOLVED | Noted: 2019-10-31 | Resolved: 2020-01-24

## 2020-01-24 PROCEDURE — 99213 OFFICE O/P EST LOW 20 MIN: CPT | Performed by: FAMILY MEDICINE

## 2020-01-24 RX ORDER — NAPROXEN 500 MG/1
500 TABLET ORAL 2 TIMES DAILY WITH MEALS
Qty: 60 TABLET | Refills: 3 | Status: SHIPPED | OUTPATIENT
Start: 2020-01-24 | End: 2020-05-21

## 2020-01-24 ASSESSMENT — PAIN SCALES - GENERAL: PAINLEVEL: WORST PAIN (10)

## 2020-01-24 NOTE — LETTER
January 24, 2020      Laverne Nguyen  [address] [address]        To Whom It May Concern,     Laverne Nguyen attended clinic here on Jan 24, 2020.  She will not be able to work for the next 7 days.    If you have questions or concerns, please call the clinic at the number listed above.    Sincerely,         Vladimir Cleveland MD

## 2020-01-24 NOTE — NURSING NOTE
Patient presents today for right knee pain. Patient reports having a knee injection Wednesday with Dr. Pimentel and not having any pain the day after.     Medication Reconciliation Complete    Diandra San LPN  1/24/2020 2:04 PM

## 2020-01-24 NOTE — PROGRESS NOTES
SUBJECTIVE:  Laverne Nguyen is a 39 year old female here for right knee pain.  She has a history of right knee osteoarthritis.  She has had periodic steroid injections the past with good success.  She reports that last night she was working a night shift when she had acute onset of pain in her knee.  She was not doing anything active at that time, no trauma, falls etc.  Since that time she has been able to bear weight on her knee.  She is had no fevers or chills.  She did not ice her knees after her injection.  She does not use anti-inflammatories regularly as she has a history of gastric bypass.    ROS:    As above otherwise ROS is unremarkable.    OBJECTIVE:  /78   Pulse 74   Temp 98.6  F (37  C)   Resp 18   SpO2 98%     EXAM:  General Appearance: Pleasant, alert, appropriate appearance for age. No acute distress  Musculoskeletal: Right knee exam is relatively unreliable due to the amount of pain that she is in.  She is tearful.  She has tenderness palpation diffusely along the medial aspect of her knee.  I am unable test varus, valgus and anterior drawer, Lockman's and Mira's due to the amount of pain that she is in.  Range of motion is significantly reduced due to pain.  No erythema or warmth.    ASSESSEMENT AND PLAN:    1. Acute pain of right knee      Given the lack of trauma I do not think x-rays would be beneficial at this time.  We will focus on symptomatic relief as I suspect she is having a postinjection flare of pain.  We discussed the pathology of this.  Would recommend that for the short-term she will use naproxen twice daily with meals, reassurance was given that for 5 to 7 days she should be fine given her gastric bypass status however I would not continue this long-term.  She will use ice, crutches, elevation.  A note for work for the next 7 days was given.  If she is having no significant improvement she will contact me and I would consider MRI.  No sign of infection so labs were not  drawn.    [place] MD    This document was prepared using voice generated software.  While every attempt was made for accuracy, grammatical errors may exist.

## 2020-02-26 ENCOUNTER — MYC MEDICAL ADVICE (OUTPATIENT)
Dept: FAMILY MEDICINE | Facility: OTHER | Age: 40
End: 2020-02-26

## 2020-02-26 DIAGNOSIS — F33.0 MAJOR DEPRESSIVE DISORDER, RECURRENT, MILD (H): Primary | ICD-10-CM

## 2020-03-02 ENCOUNTER — HEALTH MAINTENANCE LETTER (OUTPATIENT)
Age: 40
End: 2020-03-02

## 2020-03-10 ENCOUNTER — MYC MEDICAL ADVICE (OUTPATIENT)
Dept: FAMILY MEDICINE | Facility: OTHER | Age: 40
End: 2020-03-10

## 2020-03-10 DIAGNOSIS — I10 ESSENTIAL HYPERTENSION: Primary | ICD-10-CM

## 2020-03-11 ENCOUNTER — MYC MEDICAL ADVICE (OUTPATIENT)
Dept: FAMILY MEDICINE | Facility: OTHER | Age: 40
End: 2020-03-11

## 2020-03-11 RX ORDER — AMLODIPINE BESYLATE 5 MG/1
5 TABLET ORAL DAILY
Qty: 90 TABLET | Refills: 3 | Status: SHIPPED | OUTPATIENT
Start: 2020-03-11 | End: 2020-05-13

## 2020-03-21 ENCOUNTER — MYC MEDICAL ADVICE (OUTPATIENT)
Dept: FAMILY MEDICINE | Facility: OTHER | Age: 40
End: 2020-03-21

## 2020-03-24 DIAGNOSIS — S33.5XXA LUMBAR SPRAIN, INITIAL ENCOUNTER: ICD-10-CM

## 2020-03-25 RX ORDER — TRAMADOL HYDROCHLORIDE 50 MG/1
TABLET ORAL
Qty: 90 TABLET | Refills: 3 | Status: SHIPPED | OUTPATIENT
Start: 2020-03-25 | End: 2020-07-27

## 2020-03-25 NOTE — TELEPHONE ENCOUNTER
She was given 6 months of this at last appointment, but has not filled within 30 d window. Sent in new prescription good for 4 months

## 2020-04-16 DIAGNOSIS — E03.8 OTHER SPECIFIED HYPOTHYROIDISM: ICD-10-CM

## 2020-04-16 RX ORDER — LEVOTHYROXINE SODIUM 175 UG/1
TABLET ORAL
Qty: 90 TABLET | Refills: 2 | Status: SHIPPED | OUTPATIENT
Start: 2020-04-16 | End: 2021-01-21

## 2020-04-16 NOTE — TELEPHONE ENCOUNTER
Prescription approved per [facility] Refill Protocol.  LVO and labs: 1/22/2020  Mercedes Brown RN on 4/16/2020 at 10:50 AM

## 2020-04-20 DIAGNOSIS — F33.0 MAJOR DEPRESSIVE DISORDER, RECURRENT, MILD (H): ICD-10-CM

## 2020-04-20 DIAGNOSIS — F41.1 GAD (GENERALIZED ANXIETY DISORDER): ICD-10-CM

## 2020-04-21 DIAGNOSIS — F51.01 PRIMARY INSOMNIA: ICD-10-CM

## 2020-04-21 RX ORDER — FLUOXETINE 40 MG/1
CAPSULE ORAL
Qty: 60 CAPSULE | Refills: 0 | Status: SHIPPED | OUTPATIENT
Start: 2020-04-21 | End: 2020-06-12

## 2020-04-21 RX ORDER — TRAZODONE HYDROCHLORIDE 100 MG/1
TABLET ORAL
Qty: 180 TABLET | Refills: 2 | Status: SHIPPED | OUTPATIENT
Start: 2020-04-21 | End: 2020-07-03

## 2020-04-21 NOTE — TELEPHONE ENCOUNTER
"[facility] sent Rx request for the following:    traZODone (DESYREL) 100 MG tablet    Sig: TAKE 2 TABLETS(200 MG) BY MOUTH AT BEDTIME      Last Prescription Date:  03/19/2020  Last Fill Qty/Refills:         60, R-0    Last Office Visit:              01/22/2020  Future Office visit:           None    Requested Prescriptions   Pending Prescriptions Disp Refills     traZODone (DESYREL) 100 MG tablet [Pharmacy Med Name: TRAZODONE 100MG TABLETS] 60 tablet 0     Sig: TAKE 2 TABLETS(200 MG) BY MOUTH AT BEDTIME       Serotonin Modulators Passed -         Passed - Recent (12 mo) or future (30 days) visit within the authorizing provider's specialty     Patient has had an office visit with the authorizing provider or a provider within the authorizing providers department within the previous 12 mos or has a future within next 30 days. See \"Patient Info\" tab in inbasket, or \"Choose Columns\" in Meds & Orders section of the refill encounter.              Passed - Medication is active on med list        Passed - Patient is age 18 or older        Passed - No active pregnancy on record        Passed - No positive pregnancy test in past 12 months             Drug-Drug Overridden by Long Mccarthy MD on Mar 25, 2020 10:08 AM    1. SEROTONERGIC OPIOIDS (HIGH RISK) / SEROTONERGIC NON-OPIOID CNS DEPRESSANTS [Level: Major] [Reason: Tolerated medication/side effects in past]    Other Orders:  traMADol (ULTRAM) 50 MG tablet          Overridden by Pati Renteria DO on Mar 19, 2020 4:14 PM    Drug-Drug    1. SEROTONERGIC OPIOIDS (HIGH RISK) / SEROTONERGIC NON-OPIOID CNS DEPRESSANTS [Level: Major] [Reason: Tolerated medication/side effects in past]        Mari Gusman RN on 4/21/2020 at 11:30 AM       "

## 2020-04-21 NOTE — TELEPHONE ENCOUNTER
[facility] sent Rx request for the following:   FLUOXETINE 40MG CAPSULES  Sig:  TAKE 1 CAPSULE(40 MG) BY MOUTH DAILY    Last Prescription Date:   4/23/2019  Last Fill Qty/Refills:         90, R-3   Last Office Visit:              1/22/2020   Future Office visit:           None    Pt's dose was increased to a total of 60 mg daily (advised to add 20 mg to the 40 mg Pt was already prescribed).  Noted 20 mg filled for 12 months.  Pt was advised to follow up with PCP when she can.    Will fill 60 day supply, send f/u reminder letter, add note to Rx.    Overridden by Long Mccarthy MD on Mar 25, 2020 10:08 AM    Drug-Drug    1. SELECTIVE SEROTONIN REUPTAKE INHIBITORS (STRONG CYP2D6 INHIBITORS) / TRAMADOL [Level: Major] [Reason: Tolerated medication/side effects in past]    Other Orders:  traMADol (ULTRAM) 50 MG tablet          Overridden by Alin Pimentel MD on Feb 27, 2020 7:51 AM    Drug-Drug    1. SELECTIVE SEROTONIN REUPTAKE INHIBITORS / NSAIDS [Level: Major] [Reason: Benefit outweighs risk]    Other Orders:  naproxen (NAPROSYN) 500 MG tablet       2. SELECTIVE SEROTONIN REUPTAKE INHIBITORS (STRONG CYP2D6 INHIBITORS) / TRAMADOL [Level: Major] [Reason: Benefit outweighs risk]    Other Orders:  traMADol (ULTRAM) 50 MG tablet           Jessika Nava RN  ....................  4/21/2020   9:29 AM

## 2020-05-13 ENCOUNTER — VIRTUAL VISIT (OUTPATIENT)
Dept: FAMILY MEDICINE | Facility: OTHER | Age: 40
End: 2020-05-13
Attending: FAMILY MEDICINE
Payer: COMMERCIAL

## 2020-05-13 VITALS — WEIGHT: 249 LBS | BODY MASS INDEX: 41.48 KG/M2 | HEIGHT: 65 IN

## 2020-05-13 DIAGNOSIS — R50.9 FEVER AND CHILLS: Primary | ICD-10-CM

## 2020-05-13 DIAGNOSIS — J02.9 SORE THROAT: ICD-10-CM

## 2020-05-13 DIAGNOSIS — Z20.822 EXPOSURE TO COVID-19 VIRUS: ICD-10-CM

## 2020-05-13 DIAGNOSIS — R50.9 FEVER AND CHILLS: ICD-10-CM

## 2020-05-13 DIAGNOSIS — R52 BODY ACHES: ICD-10-CM

## 2020-05-13 DIAGNOSIS — R05.9 COUGH: ICD-10-CM

## 2020-05-13 PROCEDURE — 87635 SARS-COV-2 COVID-19 AMP PRB: CPT | Mod: ZL | Performed by: FAMILY MEDICINE

## 2020-05-13 PROCEDURE — 99213 OFFICE O/P EST LOW 20 MIN: CPT | Mod: 95 | Performed by: FAMILY MEDICINE

## 2020-05-13 PROCEDURE — 99207 ZZC NO CHARGE LOS: CPT

## 2020-05-13 RX ORDER — LISINOPRIL AND HYDROCHLOROTHIAZIDE 12.5; 2 MG/1; MG/1
2 TABLET ORAL DAILY
COMMUNITY
Start: 2020-03-18 | End: 2020-06-22

## 2020-05-13 ASSESSMENT — PAIN SCALES - GENERAL: PAINLEVEL: NO PAIN (0)

## 2020-05-13 ASSESSMENT — MIFFLIN-ST. JEOR: SCORE: 1810.34

## 2020-05-13 NOTE — NURSING NOTE
"Chief Complaint   Patient presents with     Suspected Covid     Patient is doing a telephone visit for a dry cough, fatigue, body aches, sore throat and cold sweats that started Friday. Patient denies fevers and states she has been exposed to someone who tested positive for coronavirus. 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    Initial Ht 1.651 m (5' 5\")   Wt 112.9 kg (249 lb)   BMI 41.44 kg/m   Estimated body mass index is 41.44 kg/m  as calculated from the following:    Height as of this encounter: 1.651 m (5' 5\").    Weight as of this encounter: 112.9 kg (249 lb).  Medication Reconciliation: complete    Jaimee Maguire LPN  "

## 2020-05-13 NOTE — PROGRESS NOTES
"Laverne Nguyen is a 40 year old female who is being evaluated via a billable telephone visit.      The patient has been notified of following:     \"This telephone visit will be conducted via a call between you and your physician/provider. We have found that certain health care needs can be provided without the need for a physical exam.  This service lets us provide the care you need with a short phone conversation.  If a prescription is necessary we can send it directly to your pharmacy.  If lab work is needed we can place an order for that and you can then stop by our lab to have the test done at a later time.    Telephone visits are billed at different rates depending on your insurance coverage. During this emergency period, for some insurers they may be billed the same as an in-person visit.  Please reach out to your insurance provider with any questions.    If during the course of the call the physician/provider feels a telephone visit is not appropriate, you will not be charged for this service.\"    Patient has given verbal consent for Telephone visit?  Yes    What phone number would you like to be contacted at? [phone]    How would you like to obtain your AVS? [facility]     Nursing Notes:   Jaimee Maguire LPN  5/13/2020  3:38 PM  Signed  Chief Complaint   Patient presents with     Suspected Covid     Patient is doing a telephone visit for a dry cough, fatigue, body aches, sore throat and cold sweats that started Friday. Patient denies fevers and states she has been exposed to someone who tested positive for coronavirus. 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    Initial Ht 1.651 m (5' 5\")   Wt 112.9 kg (249 lb)   BMI 41.44 kg/m   Estimated body mass index is 41.44 kg/m  as calculated from the following:    Height as of this encounter: 1.651 m (5' 5\").    Weight as of this encounter: 112.9 kg (249 lb).  Medication Reconciliation: PIERRE Perdomo" Patrick is a 40 year old female who presents to clinic today for the following health issues:    HPI  Patient calls for a telephone visit with symptoms suspicious for Covid-19.  She has had a dry cough, fatigue, body aches, sore throat, and sweats.  This is been going on for 4 to 5 days. She has not had a high fever. She has been exposed to someone who tested positive for corona virus in her apartment building, and works at a healthcare facility.  She works at [employer].    Patient Active Problem List   Diagnosis     Primary insomnia     Tobacco abuse     Rectal bleeding     Pain medication agreement     Otosclerosis     Morbid obesity (H)     Major depressive disorder, recurrent, mild (H)     Lumbar facet joint syndrome     Idiopathic angioedema     Other specified hypothyroidism     Essential hypertension     Degenerative arthritis of knee     B12 deficiency     Anxiety state     Avulsion fracture of ankle, right, closed, initial encounter     History of gastric bypass     Patellofemoral pain syndrome of left knee     Patellofemoral arthralgia of right knee     Nonspecific ST-T wave electrocardiographic changes     Tobacco abuse counseling     Past Surgical History:   Procedure Laterality Date     ADENOIDECTOMY      03/87     ANKLE SURGERY      8/14     COLONOSCOPY  01/09/2014    hyperplastic, 10 year follow up     LAPAROSCOPIC TUBAL LIGATION      2009     OTHER SURGICAL HISTORY      10/86,34827.0,IN CREATE EARDRUM OPENING GEN ANESTH     OTHER SURGICAL HISTORY      1980,34090.0,IN CREATE EARDRUM OPENING GEN ANESTH     OTHER SURGICAL HISTORY      1992,486542,OTHER, with left PE tube     OTHER SURGICAL HISTORY      01/94,283063,OTHER     OTHER SURGICAL HISTORY      05/22/07,409291,OTHER, for obesity, Dr. Hogue, [place]       Social History     Tobacco Use     Smoking status: Former Smoker     Packs/day: 0.25     Types: Cigarettes     Smokeless tobacco: Never Used   Substance Use Topics     Alcohol use: No  "    Frequency: Never     Family History   Problem Relation Age of Onset     Hypertension Mother         Hypertension     Other - See Comments Mother         History of depression     Hypertension Brother         Hypertension         Current Outpatient Medications   Medication Sig Dispense Refill     buPROPion (WELLBUTRIN XL) 150 MG 24 hr tablet TAKE 1 TABLET(150 MG) BY MOUTH EVERY MORNING 90 tablet 3     Cholecalciferol (VITAMIN D3) 2000 units CAPS Take 1 tablet by mouth daily 30 capsule 11     cyanocobalamin (CYANOCOBALAMIN) 1000 MCG/ML injection Inject 1 mL (1,000 mcg) into the muscle every 30 days 3 mL 3     FLUoxetine (PROZAC) 20 MG capsule Take 1 capsule (20 mg) by mouth daily Along with 40 milligram for totaL 60 every day 90 capsule 3     FLUoxetine (PROZAC) 40 MG capsule TAKE 1 CAPSULE (40 MG) ALONG WITH A 20 MG FOR A TOTAL OF 60 MG MAX DAILY DOSE 60 capsule 0     levothyroxine (SYNTHROID/LEVOTHROID) 175 MCG tablet TAKE 1 TABLET(175 MCG) BY MOUTH DAILY 90 tablet 2     lisinopril-hydrochlorothiazide (ZESTORETIC) 20-12.5 MG tablet Take 2 tablets by mouth daily       LORazepam (ATIVAN) 1 MG tablet Take 1 tablet (1 mg) by mouth every 6 hours 30 tablet 5     Multiple Vitamin (MULTI-VITAMINS) TABS Take 1 tablet by mouth       syringe/needle, disp, (MEDSAVER SYRINGE) 25G X 1\" 3 ML MISC Inject 1 each into the muscle every 30 days 12 each 0     traMADol (ULTRAM) 50 MG tablet TAKE 1 TABLET(50 MG) BY MOUTH EVERY 6 HOURS AS NEEDED FOR SEVERE PAIN 90 tablet 3     traZODone (DESYREL) 100 MG tablet TAKE 2 TABLETS(200 MG) BY MOUTH AT BEDTIME 180 tablet 2     cetirizine (ZYRTEC) 10 MG tablet Take 1 tablet (10 mg) by mouth daily (Patient not taking: Reported on 5/13/2020) 90 tablet 3     EPINEPHrine (EPIPEN/ADRENACLICK/OR ANY BX GENERIC EQUIV) 0.3 MG/0.3ML injection 2-pack Inject 0.3 mLs (0.3 mg) into the muscle as needed for anaphylaxis (Patient not taking: Reported on 5/13/2020) 0.6 mL 0     metroNIDAZOLE (METROGEL) 1 % gel " "Apply topically daily (Patient not taking: Reported on 5/13/2020) 60 g 3     naproxen (NAPROSYN) 500 MG tablet Take 1 tablet (500 mg) by mouth 2 times daily (with meals) (Patient not taking: Reported on 5/13/2020) 60 tablet 3     Allergies   Allergen Reactions     Amoxicillin Other (See Comments)     Pt reports it is ineffective for her personally       Reviewed and updated as needed this visit by Provider         Review of Systems   ROS is negative except as noted above          Objective   Reported vitals:  Ht 1.651 m (5' 5\")   Wt 112.9 kg (249 lb)   BMI 41.44 kg/m     healthy, alert and no distress  PSYCH: Alert and oriented times 3; coherent speech, normal   rate and volume, able to articulate logical thoughts, able   to abstract reason, no tangential thoughts, no hallucinations   or delusions  Her affect is normal  RESP: No cough, no audible wheezing, able to talk in full sentences  Remainder of exam unable to be completed due to telephone visits    Diagnostic Test Results:  none         Assessment/Plan:  Laverne was seen today for suspected covid.    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  PCR testing is ordered.  Discussed symptomatic treatment and what to watch for for worsening symptoms.  Will notify of results when available.  She should stay home from work until she has results of testing and her " symptoms are gone.    No follow-ups on file.      Phone call duration:  6 minutes    Aiden Pinedo MD

## 2020-05-15 ENCOUNTER — TELEPHONE (OUTPATIENT)
Dept: FAMILY MEDICINE | Facility: OTHER | Age: 40
End: 2020-05-15

## 2020-05-15 LAB
SARS-COV-2 RNA SPEC QL NAA+PROBE: ABNORMAL
SPECIMEN SOURCE: ABNORMAL

## 2020-05-15 NOTE — TELEPHONE ENCOUNTER
Patient wants to know when she will get the results of her COVID test    Please call to advise    Thank you

## 2020-05-15 NOTE — TELEPHONE ENCOUNTER
Aiden Pinedo MD contacted patient with results.  Valorie Valentin LPN............5/15/2020 3:49 PM

## 2020-05-21 DIAGNOSIS — M25.561 ACUTE PAIN OF RIGHT KNEE: ICD-10-CM

## 2020-05-22 RX ORDER — NAPROXEN 500 MG/1
500 TABLET ORAL 2 TIMES DAILY WITH MEALS
Qty: 60 TABLET | Refills: 3 | Status: SHIPPED | OUTPATIENT
Start: 2020-05-22 | End: 2020-06-12

## 2020-05-22 NOTE — TELEPHONE ENCOUNTER
[facility] sent Rx request for the following:      naproxen (NAPROSYN) 500 MG tablet  Sig: Take 1 tablet (500 mg) by mouth 2 times daily (with meals)      Last Prescription Date:   1/24/2020  Last Fill Qty/Refills:         60, R-3    Last Office Visit:              1/22/2020   Future Office visit:           none      Prescription refilled per RN Medication Refill Policy.................... Sukumar Hooper RN ....................  5/22/2020   9:29 AM

## 2020-06-12 ENCOUNTER — OFFICE VISIT (OUTPATIENT)
Dept: FAMILY MEDICINE | Facility: OTHER | Age: 40
End: 2020-06-12
Attending: NURSE PRACTITIONER
Payer: COMMERCIAL

## 2020-06-12 ENCOUNTER — HOSPITAL ENCOUNTER (OUTPATIENT)
Dept: GENERAL RADIOLOGY | Facility: OTHER | Age: 40
End: 2020-06-12
Attending: NURSE PRACTITIONER
Payer: COMMERCIAL

## 2020-06-12 ENCOUNTER — NURSE TRIAGE (OUTPATIENT)
Dept: FAMILY MEDICINE | Facility: OTHER | Age: 40
End: 2020-06-12

## 2020-06-12 VITALS
OXYGEN SATURATION: 98 % | RESPIRATION RATE: 16 BRPM | TEMPERATURE: 97.7 F | WEIGHT: 225.9 LBS | HEIGHT: 66 IN | BODY MASS INDEX: 36.31 KG/M2 | HEART RATE: 106 BPM | SYSTOLIC BLOOD PRESSURE: 120 MMHG | DIASTOLIC BLOOD PRESSURE: 60 MMHG

## 2020-06-12 DIAGNOSIS — Z20.822 PERSON UNDER INVESTIGATION FOR COVID-19: Primary | ICD-10-CM

## 2020-06-12 DIAGNOSIS — R05.8 DRY COUGH: ICD-10-CM

## 2020-06-12 DIAGNOSIS — R53.83 FATIGUE, UNSPECIFIED TYPE: ICD-10-CM

## 2020-06-12 LAB
BASOPHILS # BLD AUTO: 0.1 10E9/L (ref 0–0.2)
BASOPHILS NFR BLD AUTO: 0.7 %
D DIMER PPP DDU-MCNC: <200 NG/ML D-DU (ref 0–230)
DIFFERENTIAL METHOD BLD: NORMAL
EOSINOPHIL # BLD AUTO: 0.2 10E9/L (ref 0–0.7)
EOSINOPHIL NFR BLD AUTO: 2.2 %
ERYTHROCYTE [DISTWIDTH] IN BLOOD BY AUTOMATED COUNT: 13.8 % (ref 10–15)
HCT VFR BLD AUTO: 41.7 % (ref 35–47)
HGB BLD-MCNC: 14.5 G/DL (ref 11.7–15.7)
IMM GRANULOCYTES # BLD: 0.1 10E9/L (ref 0–0.4)
IMM GRANULOCYTES NFR BLD: 0.7 %
LYMPHOCYTES # BLD AUTO: 2.6 10E9/L (ref 0.8–5.3)
LYMPHOCYTES NFR BLD AUTO: 30 %
MCH RBC QN AUTO: 32.1 PG (ref 26.5–33)
MCHC RBC AUTO-ENTMCNC: 34.8 G/DL (ref 31.5–36.5)
MCV RBC AUTO: 92 FL (ref 78–100)
MONOCYTES # BLD AUTO: 0.7 10E9/L (ref 0–1.3)
MONOCYTES NFR BLD AUTO: 7.5 %
NEUTROPHILS # BLD AUTO: 5.2 10E9/L (ref 1.6–8.3)
NEUTROPHILS NFR BLD AUTO: 58.9 %
PLATELET # BLD AUTO: 393 10E9/L (ref 150–450)
RBC # BLD AUTO: 4.52 10E12/L (ref 3.8–5.2)
WBC # BLD AUTO: 8.8 10E9/L (ref 4–11)

## 2020-06-12 PROCEDURE — 99214 OFFICE O/P EST MOD 30 MIN: CPT | Performed by: NURSE PRACTITIONER

## 2020-06-12 PROCEDURE — 85379 FIBRIN DEGRADATION QUANT: CPT | Mod: ZL | Performed by: NURSE PRACTITIONER

## 2020-06-12 PROCEDURE — 85025 COMPLETE CBC W/AUTO DIFF WBC: CPT | Mod: ZL | Performed by: NURSE PRACTITIONER

## 2020-06-12 PROCEDURE — 36415 COLL VENOUS BLD VENIPUNCTURE: CPT | Mod: ZL | Performed by: NURSE PRACTITIONER

## 2020-06-12 PROCEDURE — U0003 INFECTIOUS AGENT DETECTION BY NUCLEIC ACID (DNA OR RNA); SEVERE ACUTE RESPIRATORY SYNDROME CORONAVIRUS 2 (SARS-COV-2) (CORONAVIRUS DISEASE [COVID-19]), AMPLIFIED PROBE TECHNIQUE, MAKING USE OF HIGH THROUGHPUT TECHNOLOGIES AS DESCRIBED BY CMS-2020-01-R: HCPCS | Mod: ZL | Performed by: NURSE PRACTITIONER

## 2020-06-12 PROCEDURE — 71046 X-RAY EXAM CHEST 2 VIEWS: CPT

## 2020-06-12 ASSESSMENT — PAIN SCALES - GENERAL: PAINLEVEL: NO PAIN (0)

## 2020-06-12 ASSESSMENT — MIFFLIN-ST. JEOR: SCORE: 1713.49

## 2020-06-12 ASSESSMENT — PATIENT HEALTH QUESTIONNAIRE - PHQ9: SUM OF ALL RESPONSES TO PHQ QUESTIONS 1-9: 0

## 2020-06-12 NOTE — LETTER
June 14, 2020        Laverne RHOADES Patrick   [address]  [facility] [address]    This letter provides a written record that you were tested for COVID-19 on 6/12/20.   Your result was negative.    This means that we didn t find the virus that causes COVID-19 in your sample. A test may show negative when you do actually have the virus. This can happen when the virus is in the early stages of infection, before you feel illness symptoms.    Even if you don t have symptoms, they may still appear. For safety, it s very important to follow these rules.    Keep yourself away from others (self-isolation):      Stay home. Don t go to work, school or anywhere else.     Stay in your own room (and use your own bathroom), if you can.    Stay away from others in your home. No hugging, kissing or shaking hands. No visitors.    Clean  high touch  surfaces often (doorknobs, counters, handles, etc.). Use a household cleaning spray or wipes.    Cover your mouth and nose with a mask, tissue or washcloth to avoid spreading germs.    Wash your hands and face often with soap and water.    Stay in self-isolation until you meet ALL of the guidelines below:    1. You have had no fever for at least 72 hours (that is 3 full days of no fever without the use of medicine that reduces fevers), AND  2. other symptoms (such as cough, shortness of breath) have gotten better, AND  3. at least 10 days have passed since your symptoms first appeared.    Going back to work  Check with your employer for any guidelines to follow for going back to work.    Employers: This document serves as formal notice that your employee tested negative for COVID-19, as of the testing date shown above.    For questions regarding this letter or your Negative COVID-19 result, call [phone] between 8A to 6:30P (M-F) and 10A to 6:30P (weekends).

## 2020-06-12 NOTE — TELEPHONE ENCOUNTER
States that she was already had a COVID-19 test and it was positive. States she is getting worse, please call

## 2020-06-12 NOTE — PROGRESS NOTES
HPI:    Laverne Nguyen is a 40 year old female  who presents to [facility] today for cough and fatigue.    Patient tested positive for covid-19 on 2020, states she was sick for about a week.  Patient now with symptoms for the past 5 days including dry cough with dry heaves, fatigue, cold sweats, chills, shortness of breath with exertion, body aches, and feels like she did when she had covid-19 last month.  No measured fevers.  Cough is intermittent.  No chest tightness or heaviness.  No chest congestion or productive cough.  No pain with breathing or coughing.  No leg pain or swelling.  No chest pain or palpitations.  Minimal runny/stuffy nose.  No sore throat.  No headaches.  No nausea or vomiting.  No change in appetite.  No diarrhea.  Extremely fatigued.  No rashes.  No known tick bites.  Lower abdominal cramping for 3-5 days.  No urinary symptoms.   No current known exposures to covid-19 at this time.    Not taking any OTC medications.  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      Past Medical History:   Diagnosis Date     Cannabis abuse, uncomplicated     use found on urine drug screen     Encounter for general adult medical examination without abnormal findings     No Comments Provided     Essential (primary) hypertension          Gastro-esophageal reflux disease without esophagitis     No Comments Provided     Low back pain     No Comments Provided     Morbid (severe) obesity due to excess calories (H)     07,with a body mass index of 51     Other specified anxiety disorders     No Comments Provided     Otitis externa of both ears     Recurrent     Personal history of other medical treatment (CODE)      I, para 1.     Tobacco use     No Comments Provided     Past Surgical History:   Procedure Laterality Date     ADENOIDECTOMY           ANKLE SURGERY           COLONOSCOPY  2014    hyperplastic, 10 year follow up     LAPAROSCOPIC TUBAL  "LIGATION      2009     OTHER SURGICAL HISTORY      10/86,24734.0,NE CREATE EARDRUM OPENING GEN ANESTH     OTHER SURGICAL HISTORY      1980,40680.0,NE CREATE EARDRUM OPENING GEN ANESTH     OTHER SURGICAL HISTORY      1992,517514,OTHER, with left PE tube     OTHER SURGICAL HISTORY      01/94,755045,OTHER     OTHER SURGICAL HISTORY      05/22/07,505675,OTHER, for obesity, Dr. Hogue, [place]     Social History     Tobacco Use     Smoking status: Former Smoker     Packs/day: 0.25     Types: Cigarettes     Smokeless tobacco: Never Used   Substance Use Topics     Alcohol use: No     Frequency: Never     Current Outpatient Medications   Medication Sig Dispense Refill     buPROPion (WELLBUTRIN XL) 150 MG 24 hr tablet TAKE 1 TABLET(150 MG) BY MOUTH EVERY MORNING 90 tablet 3     cyanocobalamin (CYANOCOBALAMIN) 1000 MCG/ML injection Inject 1 mL (1,000 mcg) into the muscle every 30 days 3 mL 3     levothyroxine (SYNTHROID/LEVOTHROID) 175 MCG tablet TAKE 1 TABLET(175 MCG) BY MOUTH DAILY 90 tablet 2     lisinopril-hydrochlorothiazide (ZESTORETIC) 20-12.5 MG tablet Take 2 tablets by mouth daily       LORazepam (ATIVAN) 1 MG tablet Take 1 tablet (1 mg) by mouth every 6 hours 30 tablet 5     Multiple Vitamin (MULTI-VITAMINS) TABS Take 1 tablet by mouth       syringe/needle, disp, (MEDSAVER SYRINGE) 25G X 1\" 3 ML MISC Inject 1 each into the muscle every 30 days 12 each 0     traMADol (ULTRAM) 50 MG tablet TAKE 1 TABLET(50 MG) BY MOUTH EVERY 6 HOURS AS NEEDED FOR SEVERE PAIN 90 tablet 3     Cholecalciferol (VITAMIN D3) 2000 units CAPS Take 1 tablet by mouth daily (Patient not taking: Reported on 6/12/2020) 30 capsule 11     traZODone (DESYREL) 100 MG tablet TAKE 2 TABLETS(200 MG) BY MOUTH AT BEDTIME (Patient not taking: Reported on 6/12/2020) 180 tablet 2     Allergies   Allergen Reactions     Amoxicillin Other (See Comments)     Pt reports it is ineffective for her personally         Past medical history, past surgical history, " "current medications and allergies reviewed and accurate to the best of my knowledge.        ROS:  Refer to HPI    /60   Pulse 106   Temp 97.7  F (36.5  C) (Tympanic)   Resp 16   Ht 1.664 m (5' 5.5\")   Wt 102.5 kg (225 lb 14.4 oz)   SpO2 98%   BMI 37.02 kg/m      EXAM:  General Appearance: fatigued but non toxic appearing adult female, appropriate appearance for age. No acute distress  Eyes: conjunctivae normal without erythema or irritation, corneas clear, no drainage or crusting, no eyelid swelling, pupils equal   Orophayrnx: moist mucous membranes, posterior pharynx without erythema, tonsils without hypertrophy, no erythema, no exudates or petechiae, no post nasal drip seen, no trismus, voice clear.    Nose: no drainage or congestion noted  Neck: supple without adenopathy  Respiratory: normal chest wall and respirations.  Normal effort.  Clear to auscultation bilaterally, no wheezing, crackles or rhonchi.  No increased work of breathing.  No cough appreciated.  Cardiac: RRR with no murmurs  Musculoskeletal:  Equal movement of bilateral upper extremities.  Equal movement of bilateral lower extremities.  Normal gait.    Psychological: normal affect, alert, oriented, and pleasant.       Xray and Labs:  Results for orders placed or performed during the hospital encounter of 06/12/20   XR Chest 2 Views     Status: None    Narrative    PROCEDURE:  XR CHEST 2 VW    HISTORY: Person under investigation for COVID-19; Dry cough; Fatigue,  unspecified type, Prior positive COVID test results, recurrent  symptoms.    COMPARISON:  CT PE chest 10/19/2019.    FINDINGS:  The cardiomediastinal contours are normal.  The trachea is midline.  No focal consolidation, effusion or pneumothorax.    No suspicious osseous lesion or subdiaphragmatic free air.      Impression    IMPRESSION:      No focal consolidation.    NICKY SAEED MD   Results for orders placed or performed in visit on 06/12/20   D-Dimer GH     Status: " None   Result Value Ref Range    D-Dimer ng/mL <200 0 - 230 ng/ml D-DU   CBC and Differential     Status: None   Result Value Ref Range    WBC 8.8 4.0 - 11.0 10e9/L    RBC Count 4.52 3.8 - 5.2 10e12/L    Hemoglobin 14.5 11.7 - 15.7 g/dL    Hematocrit 41.7 35.0 - 47.0 %    MCV 92 78 - 100 fl    MCH 32.1 26.5 - 33.0 pg    MCHC 34.8 31.5 - 36.5 g/dL    RDW 13.8 10.0 - 15.0 %    Platelet Count 393 150 - 450 10e9/L    Diff Method Automated Method     % Neutrophils 58.9 %    % Lymphocytes 30.0 %    % Monocytes 7.5 %    % Eosinophils 2.2 %    % Basophils 0.7 %    % Immature Granulocytes 0.7 %    Absolute Neutrophil 5.2 1.6 - 8.3 10e9/L    Absolute Lymphocytes 2.6 0.8 - 5.3 10e9/L    Absolute Monocytes 0.7 0.0 - 1.3 10e9/L    Absolute Eosinophils 0.2 0.0 - 0.7 10e9/L    Absolute Basophils 0.1 0.0 - 0.2 10e9/L    Abs Immature Granulocytes 0.1 0 - 0.4 10e9/L                 ASSESSMENT/PLAN:    ICD-10-CM    1. Person under investigation for COVID-19  Z20.828 XR Chest 2 Views     CBC and Differential     Symptomatic COVID-19 Virus (Coronavirus) by PCR     CBC and Differential   2. Dry cough  R05 XR Chest 2 Views     CBC and Differential     D-Dimer GH     D-Dimer GH     CBC and Differential   3. Fatigue, unspecified type  R53.83 XR Chest 2 Views     CBC and Differential     D-Dimer GH     D-Dimer GH     CBC and Differential       CXR completed and personally reviewed, no obvious infiltrate, radiologist over read:  No focal consolidation.    CBC - normal  D-dimer - negative  Covid - 19 test - pending    Clinical exam is consistent with viral illness.  Recommend symptomatic treatment and follow up if worsening.    Discussed with patient viral vs bacterial respiratory illness, and evidence based practice and guidelines for cough without fever or infiltrate on xray are not indicative of pneumonia and should not be treated with antibiotics.    Discussed with patient that symptoms and exam are consistent with viral illness.       Symptomatic treatment - Encouraged fluids, honey, elevation, humidifier, lozenges, rest, etc   May use over-the-counter cough/cold medication, Tylenol or ibuprofen PRN    Instructed to follow home isolation and social distancing while symptomatic and while awaiting covid-19 test results.    Discussed warning signs/symptoms indicative of need to f/u  Follow up if symptoms persist or worsen or concerns          I explained my diagnostic considerations and recommendations to the patient, who voiced understanding and agreement with the treatment plan. All questions were answered. We discussed potential side effects of any prescribed or recommended therapies, as well as expectations for response to treatments.    Disclaimer:  This note consists of words and symbols derived from keyboarding, dictation, or using voice recognition software. As a result, there may be errors in the script that have gone undetected. Please consider this when interpreting information found in this note.

## 2020-06-12 NOTE — NURSING NOTE
"Chief Complaint   Patient presents with     Cough     fever,fatigue     Had COVID-19 in May and feels the same way as she did when she had it.    Initial /60   Pulse 106   Temp 97.7  F (36.5  C) (Tympanic)   Resp 16   Ht 1.664 m (5' 5.5\")   Wt 102.5 kg (225 lb 14.4 oz)   SpO2 98%   BMI 37.02 kg/m   Estimated body mass index is 37.02 kg/m  as calculated from the following:    Height as of this encounter: 1.664 m (5' 5.5\").    Weight as of this encounter: 102.5 kg (225 lb 14.4 oz).    Medication Reconciliation: complete      Norma J. Gosselin, LPN  "

## 2020-06-12 NOTE — LETTER
[facility]  [address]  [place] [address]  Phone: [phone]  Fax: [phone]    June 12, 2020        Laverne Nguyen  [address]  [facility] [address]          To whom it may concern:    RE: Laverne Nguyen    Laverne was seen today at our clinic and missed work.  Laverne is unable to work until covid-19 test results are available, anticipate 6/14/2020 or 6/15/2020.    Please contact me for questions or concerns.      Sincerely,        Thalia Bloom NP

## 2020-06-12 NOTE — TELEPHONE ENCOUNTER
"Pt states she had a positive COVID test with symptoms on 5/13/2020. Pt improved and has since developed similar symptoms again but \"worse than before\"    Pt c/o of extreme fatigue, chills, diaphoresis, tactile fever, dry to productive cough with dry heaves.  Pt is unable to do much without having to stop and rest due to feeling so run down.  Denies chest pain, SOB, weakness, dizziness at this time.     Advised Pt to COVID RC and to wear mask upon entering front of clinic for screening and to call back if symptoms worsen in the meantime or present to ED with chest pain, weakness, SOB.  The patient indicates understanding of these issues and agrees with the plan.    Jessika Nava RN  ....................  6/12/2020   9:44 AM            "

## 2020-06-13 LAB
SARS-COV-2 RNA SPEC QL NAA+PROBE: NOT DETECTED
SPECIMEN SOURCE: NORMAL

## 2020-06-15 ENCOUNTER — TELEPHONE (OUTPATIENT)
Dept: FAMILY MEDICINE | Facility: OTHER | Age: 40
End: 2020-06-15

## 2020-06-20 DIAGNOSIS — I10 ESSENTIAL HYPERTENSION: ICD-10-CM

## 2020-06-20 DIAGNOSIS — F33.0 MAJOR DEPRESSIVE DISORDER, RECURRENT, MILD (H): Primary | ICD-10-CM

## 2020-06-20 DIAGNOSIS — F41.1 GAD (GENERALIZED ANXIETY DISORDER): ICD-10-CM

## 2020-06-22 RX ORDER — FLUOXETINE 40 MG/1
CAPSULE ORAL
Qty: 60 CAPSULE | Refills: 0 | OUTPATIENT
Start: 2020-06-22

## 2020-06-22 NOTE — TELEPHONE ENCOUNTER
[facility] sent Rx request for the following:      Requested Prescriptions   Pending Prescriptions Disp Refills   lisinopril-hydrochlorothiazide (ZESTORETIC) 20-12.5 MG tablet [Pharmacy Med Name: LISINOPRIL-HCTZ 20/12.5MG TABLETS] 180 tablet     Sig: TAKE 2 TABLETS BY MOUTH DAILY   Routing refill request to provider for review/approval because:  Medication is reported/historical      FLUoxetine (PROZAC) 40 MG capsule [Pharmacy Med Name: FLUOXETINE 40MG CAPSULES] 60 capsule 0    Sig: TAKE 1 CAPSULE BY MOUTH ALONG WITH A 20 MG FOR A TOTAL OF 60 MG MAX DAILY DOSE   Last Prescription Date:   2/27/20  Last Fill Qty/Refills:         90, R-3 (End: 6/12/20)    Routing refill request to provider for review/approval because:   SSRIs Protocol Failed - 6/22/2020  2:42 PM       Failed - Medication is active on med list. Discontinued 6/12/20; reason: Stopped by Patient     Last Office Visit:              5/13/20 (Dr. Pinedo, [facility])  Future Office visit:           None.    Called and spoke to Patient after verifying last name and date of birth. Pt states she stopped taking the Prozac as shown above and then restarted it, but is only taking 20 mg daily. Pt requesting refill at this time. Valorie Castorena RN .............. 6/22/2020  3:06 PM

## 2020-06-23 RX ORDER — LISINOPRIL AND HYDROCHLOROTHIAZIDE 12.5; 2 MG/1; MG/1
2 TABLET ORAL DAILY
Qty: 180 TABLET | Refills: 2 | Status: SHIPPED | OUTPATIENT
Start: 2020-06-23 | End: 2021-02-15

## 2020-07-03 ENCOUNTER — OFFICE VISIT (OUTPATIENT)
Dept: FAMILY MEDICINE | Facility: OTHER | Age: 40
End: 2020-07-03
Attending: FAMILY MEDICINE
Payer: COMMERCIAL

## 2020-07-03 VITALS
HEART RATE: 96 BPM | DIASTOLIC BLOOD PRESSURE: 66 MMHG | OXYGEN SATURATION: 98 % | RESPIRATION RATE: 16 BRPM | WEIGHT: 221.6 LBS | BODY MASS INDEX: 36.32 KG/M2 | TEMPERATURE: 98.6 F | SYSTOLIC BLOOD PRESSURE: 120 MMHG

## 2020-07-03 DIAGNOSIS — M25.561 ARTHRALGIA OF BOTH LOWER LEGS: Primary | ICD-10-CM

## 2020-07-03 DIAGNOSIS — I10 ESSENTIAL HYPERTENSION: ICD-10-CM

## 2020-07-03 DIAGNOSIS — M25.562 ARTHRALGIA OF BOTH LOWER LEGS: Primary | ICD-10-CM

## 2020-07-03 DIAGNOSIS — F41.1 GAD (GENERALIZED ANXIETY DISORDER): ICD-10-CM

## 2020-07-03 DIAGNOSIS — F33.0 MAJOR DEPRESSIVE DISORDER, RECURRENT, MILD (H): ICD-10-CM

## 2020-07-03 PROCEDURE — 99207 ZZC NO CHARGE LOS: CPT | Performed by: FAMILY MEDICINE

## 2020-07-03 PROCEDURE — 25000128 H RX IP 250 OP 636: Performed by: FAMILY MEDICINE

## 2020-07-03 PROCEDURE — 20610 DRAIN/INJ JOINT/BURSA W/O US: CPT | Mod: 50 | Performed by: FAMILY MEDICINE

## 2020-07-03 RX ORDER — METHYLPREDNISOLONE ACETATE 80 MG/ML
80 INJECTION, SUSPENSION INTRA-ARTICULAR; INTRALESIONAL; INTRAMUSCULAR; SOFT TISSUE ONCE
Status: COMPLETED | OUTPATIENT
Start: 2020-07-03 | End: 2020-07-03

## 2020-07-03 RX ADMIN — METHYLPREDNISOLONE ACETATE 80 MG: 80 INJECTION, SUSPENSION INTRA-ARTICULAR; INTRALESIONAL; INTRAMUSCULAR; SOFT TISSUE at 16:42

## 2020-07-03 RX ADMIN — METHYLPREDNISOLONE ACETATE 80 MG: 80 INJECTION, SUSPENSION INTRA-ARTICULAR; INTRALESIONAL; INTRAMUSCULAR; SOFT TISSUE at 16:44

## 2020-07-03 ASSESSMENT — ENCOUNTER SYMPTOMS
SLEEP DISTURBANCE: 0
ARTHRALGIAS: 1
FATIGUE: 0

## 2020-07-03 ASSESSMENT — PAIN SCALES - GENERAL: PAINLEVEL: MILD PAIN (2)

## 2020-07-03 NOTE — PROGRESS NOTES
SUBJECTIVE:   Laverne Nguyen is a 40 year old female who presents to clinic today for the following health issues:    HPI    Follow up on her knee pain.  Would like bilateral steroid injections again.  Last ones were 1/22/20.  Has lost weight with bike riding.  Is down 53# and has less knee pain.  Is eating much better.  Split up from her , divorce pending and this prompted the weight loss. Much less stress.      She has a new job.  Remains sober.  Is overall much happier.  Has reduced her prozac to 20 milligram from 60.  This is working adequately. Stopped her trazodone, is sleeping better.      Patient Active Problem List    Diagnosis Date Noted     Nonspecific ST-T wave electrocardiographic changes 10/31/2019     Priority: Medium     Tobacco abuse counseling 10/31/2019     Priority: Medium     Patellofemoral arthralgia of right knee 07/30/2019     Priority: Medium     Tobacco abuse 03/07/2018     Priority: Medium     Morbid obesity (H) 03/07/2018     Priority: Medium     Overview:   2007, BMI 51       Essential hypertension 03/07/2018     Priority: Medium     Primary insomnia 02/28/2018     Priority: Medium     Lumbar facet joint syndrome 01/05/2018     Priority: Medium     Major depressive disorder, recurrent, mild (H) 01/02/2015     Priority: Medium     Pain medication agreement 11/20/2014     Priority: Medium     Overview:   Updated 5/6/2016       Degenerative arthritis of knee 11/10/2014     Priority: Medium     Avulsion fracture of ankle, right, closed, initial encounter 08/04/2014     Priority: Medium     Patellofemoral pain syndrome of left knee 08/04/2014     Priority: Medium     Rectal bleeding 01/09/2014     Priority: Medium     Otosclerosis 05/16/2013     Priority: Medium     Anxiety state 03/29/2013     Priority: Medium     Overview:   IMO Update       History of gastric bypass 03/29/2013     Priority: Medium     Idiopathic angioedema 11/29/2012     Priority: Medium     Other specified  hypothyroidism 10/12/2010     Priority: Medium     B12 deficiency 09/15/2010     Priority: Medium     Overview:   secondary to gastric bypass       Past Medical History:   Diagnosis Date     Cannabis abuse, uncomplicated     use found on urine drug screen     Encounter for general adult medical examination without abnormal findings     No Comments Provided     Essential (primary) hypertension          Gastro-esophageal reflux disease without esophagitis     No Comments Provided     Low back pain     No Comments Provided     Morbid (severe) obesity due to excess calories (H)     07,with a body mass index of 51     Other specified anxiety disorders     No Comments Provided     Otitis externa of both ears     Recurrent     Personal history of other medical treatment (CODE)      I, para 1.     Tobacco use     No Comments Provided      Past Surgical History:   Procedure Laterality Date     ADENOIDECTOMY           ANKLE SURGERY           COLONOSCOPY  2014    hyperplastic, 10 year follow up     LAPAROSCOPIC TUBAL LIGATION           OTHER SURGICAL HISTORY      10/86,75127.0,MT CREATE EARDRUM OPENING GEN ANESTH     OTHER SURGICAL HISTORY      ,09884.0,MT CREATE EARDRUM OPENING GEN ANESTH     OTHER SURGICAL HISTORY      ,082457,OTHER, with left PE tube     OTHER SURGICAL HISTORY      ,546741,OTHER     OTHER SURGICAL HISTORY      07,279582,OTHER, for obesity, Dr. Hogue, [place]     Social History     Tobacco Use     Smoking status: Former Smoker     Packs/day: 0.25     Types: Cigarettes     Smokeless tobacco: Never Used   Substance Use Topics     Alcohol use: No     Frequency: Never     Current Outpatient Medications   Medication Sig Dispense Refill     buPROPion (WELLBUTRIN XL) 150 MG 24 hr tablet TAKE 1 TABLET(150 MG) BY MOUTH EVERY MORNING 90 tablet 3     Cholecalciferol (VITAMIN D3) 2000 units CAPS Take 1 tablet by mouth daily 30 capsule 11     cyanocobalamin  "(CYANOCOBALAMIN) 1000 MCG/ML injection Inject 1 mL (1,000 mcg) into the muscle every 30 days 3 mL 3     FLUoxetine (PROZAC) 20 MG capsule Take 1 capsule (20 mg) by mouth daily 90 capsule 0     levothyroxine (SYNTHROID/LEVOTHROID) 175 MCG tablet TAKE 1 TABLET(175 MCG) BY MOUTH DAILY 90 tablet 2     lisinopril-hydrochlorothiazide (ZESTORETIC) 20-12.5 MG tablet TAKE 2 TABLETS BY MOUTH DAILY 180 tablet 2     LORazepam (ATIVAN) 1 MG tablet Take 1 tablet (1 mg) by mouth every 6 hours 30 tablet 5     Multiple Vitamin (MULTI-VITAMINS) TABS Take 1 tablet by mouth       syringe/needle, disp, (MEDSAVER SYRINGE) 25G X 1\" 3 ML MISC Inject 1 each into the muscle every 30 days 12 each 0     traMADol (ULTRAM) 50 MG tablet TAKE 1 TABLET(50 MG) BY MOUTH EVERY 6 HOURS AS NEEDED FOR SEVERE PAIN 90 tablet 3     Allergies   Allergen Reactions     Amoxicillin Other (See Comments)     Pt reports it is ineffective for her personally       Review of Systems   Constitutional: Negative for fatigue.   Musculoskeletal: Positive for arthralgias.   Psychiatric/Behavioral: Negative for sleep disturbance.        OBJECTIVE:     /66   Pulse 96   Temp 98.6  F (37  C)   Resp 16   Wt 100.5 kg (221 lb 9.6 oz)   SpO2 98%   Breastfeeding No   BMI 36.32 kg/m    Body mass index is 36.32 kg/m .  Physical Exam  Constitutional:       Appearance: Normal appearance.   Musculoskeletal:      Comments: Discussed with her risks of injections.  She consented. Right knee prepped and infiltrated with 4 ml 1% lidocaine and 80 milligram depotmedrol  Same procedure was completed on left knee as well.     Neurological:      General: No focal deficit present.      Mental Status: She is alert and oriented to person, place, and time.         Diagnostic Test Results:  none     ASSESSMENT/PLAN:         (M25.561,  M25.562) Arthralgia of both lower legs  (primary encounter diagnosis)  Comment: Pain Knee  Plan: methylPREDNISolone (DEPO-MEDROL) injection 80         mg, " Large Joint/Bursa injection and/or drainage        (Shoulder, Knee), methylPREDNISolone         (DEPO-MEDROL) injection 80 mg        This is much improved with the significant weight loss.  She appears much happier overall as well.  Encouraged her to maintain the changes and follow up as needed for injections.      (F33.0) Major depressive disorder, recurrent, mild (H)  Comment:    Plan: FLUoxetine (PROZAC) 20 MG capsule        Improved, new dose faxed in    (F41.1) TERESA (generalized anxiety disorder)  Comment:    Plan: FLUoxetine (PROZAC) 20 MG capsule                   Alin Pimentel MD  [facility]

## 2020-07-03 NOTE — NURSING NOTE
"Coming in for a medication check up and to talk about injection for her knees    Chief Complaint   Patient presents with     Recheck Medication     refill, talk about injection to both knees       Initial /66   Pulse 96   Temp 98.6  F (37  C)   Resp 16   Wt 100.5 kg (221 lb 9.6 oz)   SpO2 98%   Breastfeeding No   BMI 36.32 kg/m   Estimated body mass index is 36.32 kg/m  as calculated from the following:    Height as of 6/12/20: 1.664 m (5' 5.5\").    Weight as of this encounter: 100.5 kg (221 lb 9.6 oz).  Medication Reconciliation: complete    Yadira Warner LPN     Time out done with name, date of birth and what is being injected  "

## 2020-07-10 ENCOUNTER — MYC MEDICAL ADVICE (OUTPATIENT)
Dept: FAMILY MEDICINE | Facility: OTHER | Age: 40
End: 2020-07-10

## 2020-07-10 DIAGNOSIS — M79.671 RIGHT FOOT PAIN: Primary | ICD-10-CM

## 2020-07-14 NOTE — TELEPHONE ENCOUNTER
Right foot x-ray order has been placed. Patient can call to get this scheduled.   Malgorzata Cheng PA-C on 7/14/2020 at 3:42 PM

## 2020-07-14 NOTE — TELEPHONE ENCOUNTER
Sending to team let as DACIA is out of office.  Sveta Melendez LPN ....................  7/14/2020   3:40 PM

## 2020-07-22 ENCOUNTER — HOSPITAL ENCOUNTER (OUTPATIENT)
Dept: GENERAL RADIOLOGY | Facility: OTHER | Age: 40
Discharge: HOME | End: 2020-07-22
Attending: PHYSICIAN ASSISTANT | Admitting: PHYSICIAN ASSISTANT
Payer: COMMERCIAL

## 2020-07-22 DIAGNOSIS — M79.671 RIGHT FOOT PAIN: ICD-10-CM

## 2020-07-22 PROCEDURE — 73630 X-RAY EXAM OF FOOT: CPT | Mod: RT

## 2020-07-24 ENCOUNTER — MYC MEDICAL ADVICE (OUTPATIENT)
Dept: FAMILY MEDICINE | Facility: OTHER | Age: 40
End: 2020-07-24

## 2020-07-24 DIAGNOSIS — E53.8 B12 DEFICIENCY: ICD-10-CM

## 2020-07-24 DIAGNOSIS — S33.5XXA LUMBAR SPRAIN, INITIAL ENCOUNTER: ICD-10-CM

## 2020-07-27 RX ORDER — CYANOCOBALAMIN 1000 UG/ML
INJECTION, SOLUTION INTRAMUSCULAR; SUBCUTANEOUS
Qty: 3 ML | Refills: 3 | Status: SHIPPED | OUTPATIENT
Start: 2020-07-27 | End: 2021-07-16

## 2020-07-27 RX ORDER — TRAMADOL HYDROCHLORIDE 50 MG/1
TABLET ORAL
Qty: 90 TABLET | Refills: 3 | Status: SHIPPED | OUTPATIENT
Start: 2020-07-27 | End: 2020-10-26

## 2020-07-27 NOTE — TELEPHONE ENCOUNTER
[facility] sent Rx request for the following:   cyanocobalamin (CYANOCOBALAMIN) 1000 MCG/ML injection  Sig:  ADMINISTER 1 ML(1000 MCG) IN THE MUSCLE EVERY 30 DAYS    Last Prescription Date:   7/30/2019  Last Fill Qty/Refills:         3 mL, R-3    Last Office Visit:              7/3/2020   Future Office visit:           None  Vitamin Supplements (Adult) Protocol Passed  High dose Vitamin D not ordered   Recent (12 mo) or future (30 days) visit within the authorizing provider's specialty   Medication is active on med list     Jessika Nava RN  ....................  7/27/2020   1:04 PM

## 2020-07-27 NOTE — TELEPHONE ENCOUNTER
Routing refill request to provider for review/approval because:  Drug not on the [facility] refill protocol     LOV; 7/3/2020  Mercedes Brown RN on 7/27/2020 at 11:50 AM

## 2020-08-24 ENCOUNTER — MYC MEDICAL ADVICE (OUTPATIENT)
Dept: FAMILY MEDICINE | Facility: OTHER | Age: 40
End: 2020-08-24

## 2020-08-24 DIAGNOSIS — L71.9 ROSACEA: Primary | ICD-10-CM

## 2020-08-25 DIAGNOSIS — F33.0 MAJOR DEPRESSIVE DISORDER, RECURRENT, MILD (H): ICD-10-CM

## 2020-08-25 RX ORDER — LORAZEPAM 1 MG/1
TABLET ORAL
Qty: 30 TABLET | Refills: 5 | Status: SHIPPED | OUTPATIENT
Start: 2020-08-25 | End: 2020-10-26

## 2020-08-25 RX ORDER — METRONIDAZOLE 7.5 MG/G
GEL TOPICAL 2 TIMES DAILY
Qty: 45 G | Refills: 3 | Status: SHIPPED | OUTPATIENT
Start: 2020-08-25 | End: 2022-03-03

## 2020-08-25 NOTE — TELEPHONE ENCOUNTER
[facility] sent Rx request for the following: LORazepam (ATIVAN) 1 MG   Sig TAKE 1 TABLET(1 MG) BY MOUTH EVERY 6 HOURS    Last Prescription Date:   1/22/20  Last Fill Qty/Refills:         30, R-5    Last Office Visit:              7/3/20   Future Office visit:           None    Routing refill request to provider for review/approval because:  Drug not on the [facility], [facility] or [facility] refill protocol or controlled substance    Lia Hogue RN on 8/25/2020 at 2:46 PM

## 2020-10-05 ENCOUNTER — ALLIED HEALTH/NURSE VISIT (OUTPATIENT)
Dept: FAMILY MEDICINE | Facility: OTHER | Age: 40
End: 2020-10-05

## 2020-10-05 DIAGNOSIS — Z11.1 ENCOUNTER FOR TUBERCULIN SKIN TEST: Primary | ICD-10-CM

## 2020-10-05 PROCEDURE — 250N000009 HC RX 250: Performed by: FAMILY MEDICINE

## 2020-10-05 RX ADMIN — TUBERCULIN PURIFIED PROTEIN DERIVATIVE 5 UNITS: 5 INJECTION, SOLUTION INTRADERMAL at 12:52

## 2020-10-05 NOTE — PROGRESS NOTES

## 2020-10-07 ENCOUNTER — ALLIED HEALTH/NURSE VISIT (OUTPATIENT)
Dept: FAMILY MEDICINE | Facility: OTHER | Age: 40
End: 2020-10-07

## 2020-10-07 DIAGNOSIS — Z11.1 ENCOUNTER FOR TUBERCULIN SKIN TEST: Primary | ICD-10-CM

## 2020-10-07 NOTE — NURSING NOTE
[place] results: No induration.  No swelling.  No redness.  Fabienne Brown LPN  10/7/2020 1:44 PM

## 2020-10-16 ENCOUNTER — MYC MEDICAL ADVICE (OUTPATIENT)
Dept: FAMILY MEDICINE | Facility: OTHER | Age: 40
End: 2020-10-16

## 2020-10-26 ENCOUNTER — OFFICE VISIT (OUTPATIENT)
Dept: FAMILY MEDICINE | Facility: OTHER | Age: 40
End: 2020-10-26
Attending: FAMILY MEDICINE
Payer: COMMERCIAL

## 2020-10-26 ENCOUNTER — ALLIED HEALTH/NURSE VISIT (OUTPATIENT)
Dept: FAMILY MEDICINE | Facility: OTHER | Age: 40
End: 2020-10-26

## 2020-10-26 VITALS
TEMPERATURE: 98.3 F | SYSTOLIC BLOOD PRESSURE: 120 MMHG | BODY MASS INDEX: 36.48 KG/M2 | RESPIRATION RATE: 16 BRPM | WEIGHT: 222.6 LBS | OXYGEN SATURATION: 98 % | DIASTOLIC BLOOD PRESSURE: 66 MMHG | HEART RATE: 67 BPM

## 2020-10-26 DIAGNOSIS — Z23 INFLUENZA VACCINE NEEDED: ICD-10-CM

## 2020-10-26 DIAGNOSIS — Z02.83 ENCOUNTER FOR EMPLOYMENT-RELATED DRUG TESTING: Primary | ICD-10-CM

## 2020-10-26 DIAGNOSIS — S33.5XXA LUMBAR SPRAIN, INITIAL ENCOUNTER: ICD-10-CM

## 2020-10-26 DIAGNOSIS — F33.0 MAJOR DEPRESSIVE DISORDER, RECURRENT, MILD (H): ICD-10-CM

## 2020-10-26 DIAGNOSIS — M25.561 PATELLOFEMORAL ARTHRALGIA OF RIGHT KNEE: ICD-10-CM

## 2020-10-26 DIAGNOSIS — M22.2X2 PATELLOFEMORAL PAIN SYNDROME OF LEFT KNEE: Primary | ICD-10-CM

## 2020-10-26 PROCEDURE — G0463 HOSPITAL OUTPT CLINIC VISIT: HCPCS | Mod: 25

## 2020-10-26 PROCEDURE — 99213 OFFICE O/P EST LOW 20 MIN: CPT | Mod: 25 | Performed by: FAMILY MEDICINE

## 2020-10-26 PROCEDURE — G0463 HOSPITAL OUTPT CLINIC VISIT: HCPCS

## 2020-10-26 PROCEDURE — 20610 DRAIN/INJ JOINT/BURSA W/O US: CPT | Performed by: FAMILY MEDICINE

## 2020-10-26 PROCEDURE — 86580 TB INTRADERMAL TEST: CPT

## 2020-10-26 PROCEDURE — 90686 IIV4 VACC NO PRSV 0.5 ML IM: CPT

## 2020-10-26 PROCEDURE — 250N000011 HC RX IP 250 OP 636: Performed by: FAMILY MEDICINE

## 2020-10-26 RX ORDER — METHYLPREDNISOLONE ACETATE 80 MG/ML
80 INJECTION, SUSPENSION INTRA-ARTICULAR; INTRALESIONAL; INTRAMUSCULAR; SOFT TISSUE ONCE
Status: COMPLETED | OUTPATIENT
Start: 2020-10-26 | End: 2020-10-26

## 2020-10-26 RX ORDER — CITALOPRAM HYDROBROMIDE 10 MG/1
10 TABLET ORAL DAILY
Qty: 90 TABLET | Refills: 3 | Status: SHIPPED | OUTPATIENT
Start: 2020-10-26 | End: 2021-02-11

## 2020-10-26 RX ORDER — LORAZEPAM 1 MG/1
TABLET ORAL
Qty: 30 TABLET | Refills: 5 | Status: SHIPPED | OUTPATIENT
Start: 2020-10-26 | End: 2021-03-30

## 2020-10-26 RX ORDER — TRAZODONE HYDROCHLORIDE 100 MG/1
100 TABLET ORAL AT BEDTIME
COMMUNITY
End: 2022-03-03

## 2020-10-26 RX ORDER — TRAMADOL HYDROCHLORIDE 50 MG/1
50 TABLET ORAL EVERY 6 HOURS PRN
Qty: 120 TABLET | Refills: 5 | Status: SHIPPED | OUTPATIENT
Start: 2020-10-26 | End: 2021-03-30

## 2020-10-26 RX ADMIN — METHYLPREDNISOLONE ACETATE 80 MG: 80 INJECTION, SUSPENSION INTRA-ARTICULAR; INTRALESIONAL; INTRAMUSCULAR; SOFT TISSUE at 12:34

## 2020-10-26 RX ADMIN — METHYLPREDNISOLONE ACETATE 80 MG: 80 INJECTION, SUSPENSION INTRA-ARTICULAR; INTRALESIONAL; INTRAMUSCULAR; SOFT TISSUE at 12:32

## 2020-10-26 ASSESSMENT — ENCOUNTER SYMPTOMS
NERVOUS/ANXIOUS: 1
DYSPHORIC MOOD: 1
FATIGUE: 0
FEVER: 0
ARTHRALGIAS: 1

## 2020-10-26 ASSESSMENT — PAIN SCALES - GENERAL: PAINLEVEL: NO PAIN (0)

## 2020-10-26 NOTE — PROGRESS NOTES
SUBJECTIVE:   Laverne Nguyen is a 40 year old female who presents to clinic today for the following health issues:    HPI  Follow up on chronic knee pain, ativan and anxiety.  She is losing insurance soon, due to divorce.  Was finalized 1 week ago today.  Feels pretty good about this.  however wants to try celexa.  Daughter is on this and working for her.      PHQ-2 Score:     PHQ-2 (  [employer]) 7/3/2020 2020   Q1: Little interest or pleasure in doing things 0 0   Q2: Feeling down, depressed or hopeless 0 0   PHQ-2 Score 0 0       Wants knees injections again  Last was 7/3.  Seemed to last for about 3 months. Is pleased with results.  Using the tramadol 2 every AM as well as usually another 2 tabs later in the day.   reviewed and stable.   Has a single DWI, is on probation currently.  Recent relapse so is now on an ankle bracelet.  Says it was a single relapse, which she voluntarily told her PO about.        Past Medical History:   Diagnosis Date     Cannabis abuse, uncomplicated     use found on urine drug screen     Encounter for general adult medical examination without abnormal findings     No Comments Provided     Essential (primary) hypertension          Gastro-esophageal reflux disease without esophagitis     No Comments Provided     Low back pain     No Comments Provided     Morbid (severe) obesity due to excess calories (H)     07,with a body mass index of 51     Other specified anxiety disorders     No Comments Provided     Otitis externa of both ears     Recurrent     Personal history of other medical treatment (CODE)      I, para 1.     Tobacco use     No Comments Provided      Past Surgical History:   Procedure Laterality Date     ADENOIDECTOMY           ANKLE SURGERY           COLONOSCOPY  2014    hyperplastic, 10 year follow up     LAPAROSCOPIC TUBAL LIGATION           OTHER SURGICAL HISTORY      10/86,60890.0,IA CREATE EARDRUM OPENING GEN ANESTH      "OTHER SURGICAL HISTORY      1980,91996.0,MI CREATE EARDRUM OPENING GEN ANESTH     OTHER SURGICAL HISTORY      1992,478681,OTHER, with left PE tube     OTHER SURGICAL HISTORY      01/94,064278,OTHER     OTHER SURGICAL HISTORY      05/22/07,369210,OTHER, for obesity, Dr. Hogue, [place]     Social History     Tobacco Use     Smoking status: Current Some Day Smoker     Packs/day: 0.25     Types: Cigarettes     Smokeless tobacco: Never Used   Substance Use Topics     Alcohol use: No     Frequency: Never     Current Outpatient Medications   Medication Sig Dispense Refill     buPROPion (WELLBUTRIN XL) 150 MG 24 hr tablet TAKE 1 TABLET(150 MG) BY MOUTH EVERY MORNING 90 tablet 3     Cholecalciferol (VITAMIN D3) 2000 units CAPS Take 1 tablet by mouth daily 30 capsule 11     cyanocobalamin (CYANOCOBALAMIN) 1000 MCG/ML injection ADMINISTER 1 ML(1000 MCG) IN THE MUSCLE EVERY 30 DAYS 3 mL 3     levothyroxine (SYNTHROID/LEVOTHROID) 175 MCG tablet TAKE 1 TABLET(175 MCG) BY MOUTH DAILY 90 tablet 2     lisinopril-hydrochlorothiazide (ZESTORETIC) 20-12.5 MG tablet TAKE 2 TABLETS BY MOUTH DAILY 180 tablet 2     LORazepam (ATIVAN) 1 MG tablet TAKE 1 TABLET(1 MG) BY MOUTH EVERY 6 HOURS 30 tablet 5     metroNIDAZOLE (METROGEL) 0.75 % external gel Apply topically 2 times daily 45 g 3     Multiple Vitamin (MULTI-VITAMINS) TABS Take 1 tablet by mouth       syringe/needle, disp, (MEDSAVER SYRINGE) 25G X 1\" 3 ML MISC Inject 1 each into the muscle every 30 days 12 each 0     traMADol (ULTRAM) 50 MG tablet TAKE 1 TABLET(50 MG) BY MOUTH EVERY 6 HOURS AS NEEDED FOR SEVERE PAIN 90 tablet 3     traZODone (DESYREL) 100 MG tablet Take 100 mg by mouth At Bedtime         Review of Systems   Constitutional: Negative for fatigue and fever.   Musculoskeletal: Positive for arthralgias.   Psychiatric/Behavioral: Positive for dysphoric mood. The patient is nervous/anxious.         OBJECTIVE:     /66 (BP Location: Right arm, Patient Position: " Sitting, Cuff Size: Adult Regular)   Pulse 67   Temp 98.3  F (36.8  C)   Resp 16   Wt 101 kg (222 lb 9.6 oz)   SpO2 98%   BMI 36.48 kg/m    Body mass index is 36.48 kg/m .  Physical Exam  Constitutional:       Appearance: Normal appearance.   Musculoskeletal:      Comments: Bilateral knees with redness or effusions.  Discussed with her risks and she consented.  Right knee prepped and infiltrated with 4 ml 1% lidocaine and 80 milligram depot medrol  Left knee was then injected in the same manner.    Neurological:      Mental Status: She is alert.   Psychiatric:         Mood and Affect: Mood normal.         Behavior: Behavior normal.         Thought Content: Thought content normal.         Diagnostic Test Results:  none     ASSESSMENT/PLAN:         (M22.2X2) Patellofemoral pain syndrome of left knee  (primary encounter diagnosis)  Comment: stable  Plan: Large Joint/Bursa injection and/or drainage         (Shoulder, Knee), methylPREDNISolone         (DEPO-MEDROL) injection 80 mg             (S33.5XXA) Lumbar sprain, initial encounter  Comment: chronic  Plan: traMADol (ULTRAM) 50 MG tablet        Refilled this, uses it for the knee pain as well    (F33.0) Major depressive disorder, recurrent, mild (H)  Comment:  Will start celexa next.  Plan: LORazepam (ATIVAN) 1 MG tablet, citalopram         (CELEXA) 10 MG tablet             (M25.561) Patellofemoral arthralgia of right knee  Comment:    Plan: methylPREDNISolone (DEPO-MEDROL) injection 80         mg             (Z23) Influenza vaccine needed  Comment:    Plan: FLU VACCINE, 3 YRS +, IM (FLUZONE), VACCINE         ADMINISTRATION, INITIAL                 Alin Pimentel MD  [facility] AND [facility]

## 2020-10-26 NOTE — NURSING NOTE
"Coming in to talk about medication   would like to go on citalopram    Chief Complaint   Patient presents with     Imm/Inj     bilateral, refill medication       Initial /66 (BP Location: Right arm, Patient Position: Sitting, Cuff Size: Adult Regular)   Pulse 67   Temp 98.3  F (36.8  C)   Resp 16   Wt 101 kg (222 lb 9.6 oz)   SpO2 98%   BMI 36.48 kg/m   Estimated body mass index is 36.48 kg/m  as calculated from the following:    Height as of 6/12/20: 1.664 m (5' 5.5\").    Weight as of this encounter: 101 kg (222 lb 9.6 oz).  Medication Reconciliation: complete    Yadira Warner LPN  "

## 2020-10-28 ENCOUNTER — ALLIED HEALTH/NURSE VISIT (OUTPATIENT)
Dept: FAMILY MEDICINE | Facility: OTHER | Age: 40
End: 2020-10-28

## 2020-10-28 DIAGNOSIS — Z11.1 ENCOUNTER FOR TUBERCULIN SKIN TEST: Primary | ICD-10-CM

## 2020-10-28 PROCEDURE — 86580 TB INTRADERMAL TEST: CPT | Performed by: FAMILY MEDICINE

## 2020-10-28 NOTE — PROGRESS NOTES
[place] results: No induration.  No swelling.  No redness.    Mari Gusman RN on 10/28/2020 at 2:30 PM

## 2020-12-16 ENCOUNTER — ALLIED HEALTH/NURSE VISIT (OUTPATIENT)
Dept: FAMILY MEDICINE | Facility: OTHER | Age: 40
End: 2020-12-16
Attending: FAMILY MEDICINE
Payer: OTHER GOVERNMENT

## 2020-12-16 DIAGNOSIS — R09.89 RUNNY NOSE: ICD-10-CM

## 2020-12-16 DIAGNOSIS — J02.9 SORE THROAT: ICD-10-CM

## 2020-12-16 DIAGNOSIS — R05.9 COUGH: Primary | ICD-10-CM

## 2020-12-16 PROCEDURE — C9803 HOPD COVID-19 SPEC COLLECT: HCPCS

## 2020-12-16 PROCEDURE — U0003 INFECTIOUS AGENT DETECTION BY NUCLEIC ACID (DNA OR RNA); SEVERE ACUTE RESPIRATORY SYNDROME CORONAVIRUS 2 (SARS-COV-2) (CORONAVIRUS DISEASE [COVID-19]), AMPLIFIED PROBE TECHNIQUE, MAKING USE OF HIGH THROUGHPUT TECHNOLOGIES AS DESCRIBED BY CMS-2020-01-R: HCPCS | Mod: ZL | Performed by: FAMILY MEDICINE

## 2020-12-17 LAB
SARS-COV-2 RNA SPEC QL NAA+PROBE: NOT DETECTED
SPECIMEN SOURCE: NORMAL

## 2021-01-21 DIAGNOSIS — F33.0 MAJOR DEPRESSIVE DISORDER, RECURRENT, MILD (H): ICD-10-CM

## 2021-01-21 DIAGNOSIS — E03.8 OTHER SPECIFIED HYPOTHYROIDISM: ICD-10-CM

## 2021-01-21 RX ORDER — LEVOTHYROXINE SODIUM 175 UG/1
TABLET ORAL
Qty: 90 TABLET | Refills: 0 | Status: SHIPPED | OUTPATIENT
Start: 2021-01-21 | End: 2021-03-30

## 2021-01-21 RX ORDER — BUPROPION HYDROCHLORIDE 150 MG/1
TABLET ORAL
Qty: 90 TABLET | Refills: 0 | Status: SHIPPED | OUTPATIENT
Start: 2021-01-21 | End: 2021-02-11

## 2021-01-21 NOTE — LETTER
January 21, 2021      Laverne Nguyen  [address]  [place] [facility] [address]        Dear Laverne,         A refill of levothyroxine (SYNTHROID/LEVOTHROID) 175 MCG tablet and buPROPion (WELLBUTRIN XL) 150 MG 24 hr tablet have been requested by your pharmacy and we noticed that you are overdue for an annual exam.  Your last comprehensive visit with Alin Pimentel MD was on 01/22/2020.    This refill request has been sent to your provider for consideration at this time.    Your health is very important to us.  Please call the clinic at [phone] to schedule your appointment.    Thank you for choosing [facility] and [facility] for your health care needs.    Sincerely,    Refill RN  [facility]

## 2021-01-21 NOTE — TELEPHONE ENCOUNTER
[facility] sent Rx request for the following:   levothyroxine (SYNTHROID/LEVOTHROID) 175 MCG tablet  Sig: TAKE 1 TABLET(175 MCG) BY MOUTH DAILY    Last Prescription Date:   04/16/2020  Last Fill Qty/Refills:         90, R-2    Thyroid Protocol Passed - 1/21/2021  3:57 AM  Last TSH date of 01/22/2020    buPROPion (WELLBUTRIN XL) 150 MG 24 hr tablet  Sig:TAKE 1 TABLET(150 MG) BY MOUTH EVERY MORNING    Last Prescription Date:   01/17/2020  Last Fill Qty/Refills:         90, R-3    SSRIs Protocol Failed - 1/21/2021  3:57 AM       Failed - PHQ-9 score less than 5 in past 6 months    Please review last PHQ-9 score.      Last PHQ score of 0 date of 06/12/2020    Passed - Medication is Bupropion   If the medication is Bupropion (Wellbutrin), and the patient is taking for smoking cessation; OK to refill.     Associated Diagnoses  Major depressive disorder, recurrent, mild (H) [F33.0]  - Primary       Does not meet passed protocol.     Last Office Visit:              10/26/2020 ([facility])   Future Office visit:           None noted    Patient letter sent regarding appointment need. Will route for PCP review. Unable to complete prescription refill per RN Medication Refill Policy.................... Kiara Che RN ....................  1/21/2021   8:42 AM

## 2021-02-11 ENCOUNTER — MYC MEDICAL ADVICE (OUTPATIENT)
Dept: FAMILY MEDICINE | Facility: OTHER | Age: 41
End: 2021-02-11

## 2021-02-11 DIAGNOSIS — F33.0 MAJOR DEPRESSIVE DISORDER, RECURRENT, MILD (H): Primary | ICD-10-CM

## 2021-02-11 RX ORDER — BUPROPION HYDROCHLORIDE 300 MG/1
300 TABLET ORAL EVERY MORNING
Qty: 90 TABLET | Refills: 4 | Status: SHIPPED | OUTPATIENT
Start: 2021-02-11 | End: 2021-05-19

## 2021-02-15 ENCOUNTER — ALLIED HEALTH/NURSE VISIT (OUTPATIENT)
Dept: FAMILY MEDICINE | Facility: OTHER | Age: 41
End: 2021-02-15
Attending: FAMILY MEDICINE
Payer: COMMERCIAL

## 2021-02-15 ENCOUNTER — OFFICE VISIT (OUTPATIENT)
Dept: FAMILY MEDICINE | Facility: OTHER | Age: 41
End: 2021-02-15
Attending: NURSE PRACTITIONER
Payer: COMMERCIAL

## 2021-02-15 VITALS
HEART RATE: 67 BPM | BODY MASS INDEX: 35.51 KG/M2 | RESPIRATION RATE: 18 BRPM | OXYGEN SATURATION: 99 % | WEIGHT: 216.7 LBS | SYSTOLIC BLOOD PRESSURE: 128 MMHG | DIASTOLIC BLOOD PRESSURE: 80 MMHG | TEMPERATURE: 99.3 F

## 2021-02-15 DIAGNOSIS — R53.83 FATIGUE, UNSPECIFIED TYPE: ICD-10-CM

## 2021-02-15 DIAGNOSIS — R52 BODY ACHES: Primary | ICD-10-CM

## 2021-02-15 DIAGNOSIS — R53.83 FATIGUE: ICD-10-CM

## 2021-02-15 DIAGNOSIS — R79.89 ABNORMAL TSH: Primary | ICD-10-CM

## 2021-02-15 LAB
HETEROPH AB SER QL: NEGATIVE
T4 FREE SERPL-MCNC: 1.06 NG/DL (ref 0.6–1.6)
TSH SERPL DL<=0.05 MIU/L-ACNC: <0.2 IU/ML (ref 0.34–5.6)

## 2021-02-15 PROCEDURE — 86665 EPSTEIN-BARR CAPSID VCA: CPT | Mod: ZL | Performed by: NURSE PRACTITIONER

## 2021-02-15 PROCEDURE — C9803 HOPD COVID-19 SPEC COLLECT: HCPCS

## 2021-02-15 PROCEDURE — 36415 COLL VENOUS BLD VENIPUNCTURE: CPT | Mod: ZL | Performed by: NURSE PRACTITIONER

## 2021-02-15 PROCEDURE — 86308 HETEROPHILE ANTIBODY SCREEN: CPT | Mod: ZL | Performed by: NURSE PRACTITIONER

## 2021-02-15 PROCEDURE — 84443 ASSAY THYROID STIM HORMONE: CPT | Mod: ZL | Performed by: NURSE PRACTITIONER

## 2021-02-15 PROCEDURE — 84439 ASSAY OF FREE THYROXINE: CPT | Mod: ZL | Performed by: NURSE PRACTITIONER

## 2021-02-15 PROCEDURE — U0005 INFEC AGEN DETEC AMPLI PROBE: HCPCS | Mod: ZL | Performed by: FAMILY MEDICINE

## 2021-02-15 PROCEDURE — U0003 INFECTIOUS AGENT DETECTION BY NUCLEIC ACID (DNA OR RNA); SEVERE ACUTE RESPIRATORY SYNDROME CORONAVIRUS 2 (SARS-COV-2) (CORONAVIRUS DISEASE [COVID-19]), AMPLIFIED PROBE TECHNIQUE, MAKING USE OF HIGH THROUGHPUT TECHNOLOGIES AS DESCRIBED BY CMS-2020-01-R: HCPCS | Mod: ZL | Performed by: FAMILY MEDICINE

## 2021-02-15 PROCEDURE — 99214 OFFICE O/P EST MOD 30 MIN: CPT | Performed by: NURSE PRACTITIONER

## 2021-02-15 RX ORDER — LEVOTHYROXINE SODIUM 150 UG/1
150 TABLET ORAL DAILY
Qty: 30 TABLET | Refills: 1 | Status: SHIPPED | OUTPATIENT
Start: 2021-02-15 | End: 2021-05-20

## 2021-02-15 ASSESSMENT — PAIN SCALES - GENERAL: PAINLEVEL: MODERATE PAIN (4)

## 2021-02-15 NOTE — NURSING NOTE
"Chief Complaint   Patient presents with     Generalized Body Aches         Initial There were no vitals taken for this visit. Estimated body mass index is 36.48 kg/m  as calculated from the following:    Height as of 6/12/20: 1.664 m (5' 5.5\").    Weight as of 10/26/20: 101 kg (222 lb 9.6 oz).         Medication Reconciliation: Complete      Arik Diaz LPN   "

## 2021-02-15 NOTE — NURSING NOTE
"Chief Complaint   Patient presents with     Generalized Body Aches     Body aches, fatigue have been going on since Saturday. Today she states that she started to get the chills and cold sweats. Was covid swabbed today. She already had mono and covid.     Initial There were no vitals taken for this visit. Estimated body mass index is 36.48 kg/m  as calculated from the following:    Height as of 6/12/20: 1.664 m (5' 5.5\").    Weight as of 10/26/20: 101 kg (222 lb 9.6 oz).         Medication Reconciliation: Complete      Arik Diaz LPN   "

## 2021-02-16 LAB
LABORATORY COMMENT REPORT: NORMAL
SARS-COV-2 RNA RESP QL NAA+PROBE: NEGATIVE
SARS-COV-2 RNA RESP QL NAA+PROBE: NORMAL
SPECIMEN SOURCE: NORMAL
SPECIMEN SOURCE: NORMAL

## 2021-02-18 LAB — EBV VCA IGM SER QL IA: <0.2 AI (ref 0–0.8)

## 2021-03-22 ENCOUNTER — MYC MEDICAL ADVICE (OUTPATIENT)
Dept: FAMILY MEDICINE | Facility: OTHER | Age: 41
End: 2021-03-22

## 2021-03-30 ENCOUNTER — OFFICE VISIT (OUTPATIENT)
Dept: FAMILY MEDICINE | Facility: OTHER | Age: 41
End: 2021-03-30
Attending: FAMILY MEDICINE
Payer: COMMERCIAL

## 2021-03-30 VITALS
RESPIRATION RATE: 14 BRPM | WEIGHT: 217.2 LBS | SYSTOLIC BLOOD PRESSURE: 132 MMHG | TEMPERATURE: 97.3 F | HEART RATE: 92 BPM | OXYGEN SATURATION: 99 % | BODY MASS INDEX: 35.59 KG/M2 | DIASTOLIC BLOOD PRESSURE: 70 MMHG

## 2021-03-30 DIAGNOSIS — M25.561 PATELLOFEMORAL ARTHRALGIA OF RIGHT KNEE: ICD-10-CM

## 2021-03-30 DIAGNOSIS — S33.5XXA LUMBAR SPRAIN, INITIAL ENCOUNTER: ICD-10-CM

## 2021-03-30 DIAGNOSIS — M22.2X2 PATELLOFEMORAL PAIN SYNDROME OF LEFT KNEE: ICD-10-CM

## 2021-03-30 DIAGNOSIS — E03.8 OTHER SPECIFIED HYPOTHYROIDISM: Primary | ICD-10-CM

## 2021-03-30 DIAGNOSIS — F33.0 MAJOR DEPRESSIVE DISORDER, RECURRENT, MILD (H): ICD-10-CM

## 2021-03-30 PROCEDURE — 20610 DRAIN/INJ JOINT/BURSA W/O US: CPT | Mod: 50 | Performed by: FAMILY MEDICINE

## 2021-03-30 PROCEDURE — 99214 OFFICE O/P EST MOD 30 MIN: CPT | Mod: 25 | Performed by: FAMILY MEDICINE

## 2021-03-30 PROCEDURE — 250N000011 HC RX IP 250 OP 636: Performed by: FAMILY MEDICINE

## 2021-03-30 RX ORDER — METHYLPREDNISOLONE ACETATE 80 MG/ML
80 INJECTION, SUSPENSION INTRA-ARTICULAR; INTRALESIONAL; INTRAMUSCULAR; SOFT TISSUE ONCE
Status: COMPLETED | OUTPATIENT
Start: 2021-03-30 | End: 2021-03-30

## 2021-03-30 RX ORDER — LORAZEPAM 1 MG/1
TABLET ORAL
Qty: 30 TABLET | Refills: 5 | Status: SHIPPED | OUTPATIENT
Start: 2021-03-30 | End: 2021-05-18

## 2021-03-30 RX ORDER — TRAMADOL HYDROCHLORIDE 50 MG/1
50 TABLET ORAL EVERY 6 HOURS PRN
Qty: 120 TABLET | Refills: 5 | Status: SHIPPED | OUTPATIENT
Start: 2021-03-30 | End: 2021-05-18

## 2021-03-30 RX ADMIN — METHYLPREDNISOLONE ACETATE 80 MG: 80 INJECTION, SUSPENSION INTRA-ARTICULAR; INTRALESIONAL; INTRAMUSCULAR; SOFT TISSUE at 16:11

## 2021-03-30 RX ADMIN — METHYLPREDNISOLONE ACETATE 80 MG: 80 INJECTION, SUSPENSION INTRA-ARTICULAR; INTRALESIONAL; INTRAMUSCULAR; SOFT TISSUE at 16:13

## 2021-03-30 ASSESSMENT — ANXIETY QUESTIONNAIRES
2. NOT BEING ABLE TO STOP OR CONTROL WORRYING: NOT AT ALL
5. BEING SO RESTLESS THAT IT IS HARD TO SIT STILL: NOT AT ALL
7. FEELING AFRAID AS IF SOMETHING AWFUL MIGHT HAPPEN: NOT AT ALL
GAD7 TOTAL SCORE: 0
6. BECOMING EASILY ANNOYED OR IRRITABLE: NOT AT ALL
IF YOU CHECKED OFF ANY PROBLEMS ON THIS QUESTIONNAIRE, HOW DIFFICULT HAVE THESE PROBLEMS MADE IT FOR YOU TO DO YOUR WORK, TAKE CARE OF THINGS AT HOME, OR GET ALONG WITH OTHER PEOPLE: NOT DIFFICULT AT ALL
3. WORRYING TOO MUCH ABOUT DIFFERENT THINGS: NOT AT ALL
1. FEELING NERVOUS, ANXIOUS, OR ON EDGE: NOT AT ALL

## 2021-03-30 ASSESSMENT — PATIENT HEALTH QUESTIONNAIRE - PHQ9: 5. POOR APPETITE OR OVEREATING: NOT AT ALL

## 2021-03-30 ASSESSMENT — PAIN SCALES - GENERAL: PAINLEVEL: MILD PAIN (3)

## 2021-03-30 NOTE — NURSING NOTE
"Coming in for sore left elbow to shoulder , restless legs and arms    Times out done with name, date of birth and what is being injected    Chief Complaint   Patient presents with     Imm/Inj     sore left elbow up to the shoulder for one month, restless bi and legs       Initial /70   Pulse 92   Temp 97.3  F (36.3  C)   Resp 14   Wt 98.5 kg (217 lb 3.2 oz)   SpO2 99%   BMI 35.59 kg/m   Estimated body mass index is 35.59 kg/m  as calculated from the following:    Height as of 6/12/20: 1.664 m (5' 5.5\").    Weight as of this encounter: 98.5 kg (217 lb 3.2 oz).  Medication Reconciliation: complete    Yadira Warner LPN  "

## 2021-03-30 NOTE — PROGRESS NOTES
(E03.8) Other specified hypothyroidism  (primary encounter diagnosis)  Comment: I suspect some of her night symptoms are possibly from the high synthroid dose.  Before we start even more meds, I want to get this regulated.   Plan: TSH after she is on the correct dose for 8 weeks.     (M22.2X2) Patellofemoral pain syndrome of left knee  Comment: stable  Plan: methylPREDNISolone (DEPO-MEDROL) injection 80         mg, Large Joint/Bursa injection and/or drainage        (Shoulder, Knee)             (M25.561) Patellofemoral arthralgia of right knee  Comment: stable  Plan: methylPREDNISolone (DEPO-MEDROL) injection 80         mg             (F33.0) Major depressive disorder, recurrent, mild (H)  Comment: this is stable. Refilled this for 6 more months. Discussed with her the increased risk for sedation with the tramadol   Plan: LORazepam (ATIVAN) 1 MG tablet             (S33.5XXA) Lumbar sprain, initial encounter  Comment: stable.  Last MRI was 10/17 showed degenerative changes. Has now lost 70# in the last year, ongoing weight loss will continue to help her knees and back.    Plan: traMADol (ULTRAM) 50 MG tablet           shoulder and elbow exam are normal. I suspect she is having some intermittent cubital tunnel symptoms.  She is willing to observe for now, can use an extensor brase on elbow at night in future if the desires.        Karl Galloway is a 40 year old who presents for the following health issues     HPI left arm numbness.  Was in the elbow initially, now is more in shoulder.  She has symptoms only at night.  Has lots of restlessness.  This is despite her sleeping meds.  Often also has leg symptoms when trying to fall asleep. Would like to try a med for restless leg syndrome.  She had a low TSH in Feb, was given a lower synthroid dose, but she has not started it, partly out of fear of gaining weight back.      Also has chronic knee pain, getting steroid injections.  They last at least 2 months. Would  "like another today.  Is dong water areobics, and left seems to lock at times in there.  Does not want to follow up with ortho yet.     Wants refills on ativan and tramadol.  Remains sober, completed treatment as well and nearly off probation.  She feels they work.  Has stopped celexa due to side effects and has questions about cymbalta.  Uses the tramadol for her knees as well as back.      TERESA-7 SCORE 10/28/2019 10/31/2019 3/30/2021   Total Score 6 5 0       PHQ 10/31/2019 6/12/2020 7/3/2020   PHQ-9 Total Score 6 0 -   Q9: Thoughts of better off dead/self-harm past 2 weeks Not at all Not at all Not at all           Current Outpatient Medications:      buPROPion (WELLBUTRIN XL) 300 MG 24 hr tablet, Take 1 tablet (300 mg) by mouth every morning, Disp: 90 tablet, Rfl: 4     Cholecalciferol (VITAMIN D3) 2000 units CAPS, Take 1 tablet by mouth daily, Disp: 30 capsule, Rfl: 11     cyanocobalamin (CYANOCOBALAMIN) 1000 MCG/ML injection, ADMINISTER 1 ML(1000 MCG) IN THE MUSCLE EVERY 30 DAYS, Disp: 3 mL, Rfl: 3     levothyroxine (SYNTHROID/LEVOTHROID) 175 MCG tablet, TAKE 1 TABLET(175 MCG) BY MOUTH DAILY, Disp: 90 tablet, Rfl: 0     LORazepam (ATIVAN) 1 MG tablet, TAKE 1 TABLET(1 MG) BY MOUTH EVERY 6 HOURS, Disp: 30 tablet, Rfl: 5     metroNIDAZOLE (METROGEL) 0.75 % external gel, Apply topically 2 times daily, Disp: 45 g, Rfl: 3     Multiple Vitamin (MULTI-VITAMINS) TABS, Take 1 tablet by mouth, Disp: , Rfl:      syringe/needle, disp, (MEDSAVER SYRINGE) 25G X 1\" 3 ML MISC, Inject 1 each into the muscle every 30 days, Disp: 12 each, Rfl: 0     traMADol (ULTRAM) 50 MG tablet, Take 1 tablet (50 mg) by mouth every 6 hours as needed for severe pain, Disp: 120 tablet, Rfl: 5     traZODone (DESYREL) 100 MG tablet, Take 100 mg by mouth At Bedtime, Disp: , Rfl:      levothyroxine (SYNTHROID/LEVOTHROID) 150 MCG tablet, Take 1 tablet (150 mcg) by mouth daily, Disp: 30 tablet, Rfl: 1            Review of Systems         Objective    BP " 132/70   Pulse 92   Temp 97.3  F (36.3  C)   Resp 14   Wt 98.5 kg (217 lb 3.2 oz)   SpO2 99%   BMI 35.59 kg/m    Body mass index is 35.59 kg/m .  Physical Exam  Constitutional:       Appearance: Normal appearance.   Musculoskeletal:      Comments: Left shoulder and elbow with fill range of motion. Negative shoulder empty can test.  Negative impingement.      Left knee without effusion.  Discussed with her risks and she consented with an injection. Prepped and infiltrated with 4 ml 1% lidocaine and 80 milligram depot medrol  Same done on right knee next tolerated well.     Neurological:      Mental Status: She is alert.   Psychiatric:         Mood and Affect: Mood normal.         Behavior: Behavior normal.         Thought Content: Thought content normal.

## 2021-03-31 ASSESSMENT — ANXIETY QUESTIONNAIRES: GAD7 TOTAL SCORE: 0

## 2021-04-15 ENCOUNTER — ALLIED HEALTH/NURSE VISIT (OUTPATIENT)
Dept: FAMILY MEDICINE | Facility: OTHER | Age: 41
End: 2021-04-15
Attending: FAMILY MEDICINE
Payer: COMMERCIAL

## 2021-04-15 DIAGNOSIS — R53.83 FATIGUE: ICD-10-CM

## 2021-04-15 DIAGNOSIS — R09.81 NASAL CONGESTION: Primary | ICD-10-CM

## 2021-04-15 PROCEDURE — U0003 INFECTIOUS AGENT DETECTION BY NUCLEIC ACID (DNA OR RNA); SEVERE ACUTE RESPIRATORY SYNDROME CORONAVIRUS 2 (SARS-COV-2) (CORONAVIRUS DISEASE [COVID-19]), AMPLIFIED PROBE TECHNIQUE, MAKING USE OF HIGH THROUGHPUT TECHNOLOGIES AS DESCRIBED BY CMS-2020-01-R: HCPCS | Mod: ZL | Performed by: FAMILY MEDICINE

## 2021-04-15 PROCEDURE — C9803 HOPD COVID-19 SPEC COLLECT: HCPCS

## 2021-04-15 PROCEDURE — U0005 INFEC AGEN DETEC AMPLI PROBE: HCPCS | Mod: ZL | Performed by: FAMILY MEDICINE

## 2021-04-18 ENCOUNTER — HEALTH MAINTENANCE LETTER (OUTPATIENT)
Age: 41
End: 2021-04-18

## 2021-04-21 DIAGNOSIS — E03.8 OTHER SPECIFIED HYPOTHYROIDISM: ICD-10-CM

## 2021-04-22 RX ORDER — LEVOTHYROXINE SODIUM 175 UG/1
TABLET ORAL
Qty: 90 TABLET | Refills: 0 | OUTPATIENT
Start: 2021-04-22

## 2021-04-22 NOTE — TELEPHONE ENCOUNTER
[facility] sent Rx request for the following:   levothyroxine (SYNTHROID/LEVOTHROID) 175 MCG tablet   SigTAKE 1 TABLET(175 MCG) BY MOUTH DAILY      Order Status Reason By On   Discontinued None Alin Pimentel MD 3/30/21 [id number]     Unable to complete prescription refill per RN Medication Refill Policy.................... Kiara Lamb RN ....................  4/22/2021   9:22 AM

## 2021-04-26 ENCOUNTER — MYC MEDICAL ADVICE (OUTPATIENT)
Dept: FAMILY MEDICINE | Facility: OTHER | Age: 41
End: 2021-04-26

## 2021-05-11 ENCOUNTER — MYC MEDICAL ADVICE (OUTPATIENT)
Dept: FAMILY MEDICINE | Facility: OTHER | Age: 41
End: 2021-05-11

## 2021-05-17 DIAGNOSIS — F33.0 MAJOR DEPRESSIVE DISORDER, RECURRENT, MILD (H): ICD-10-CM

## 2021-05-17 DIAGNOSIS — S33.5XXA LUMBAR SPRAIN, INITIAL ENCOUNTER: ICD-10-CM

## 2021-05-17 NOTE — TELEPHONE ENCOUNTER
[facility] sent Rx request for the following:   LORazepam (ATIVAN) 1 MG tablet  Sig: TAKE 1 TABLET(1 MG) BY MOUTH EVERY 6 HOURS    Last Prescription Date:   3/30/21  Last Fill Qty/Refills:         30, R-5    Last Office Visit:              3/30/21 ([facility])   Future Office visit:           5/19/21 ([facility])    Routing refill request to provider for review/approval because:  Drug not on the [facility], [facility] or  Health refill protocol or controlled substance    traMADol (ULTRAM) 50 MG tablet  Sig: TAKE 1 TABLET(50 MG) BY MOUTH EVERY 6 HOURS AS NEEDED FOR SEVERE PAIN    Last Prescription Date:   3/30/21  Last Fill Qty/Refills:         120, R-5      Routing refill request to provider for review/approval because:  Drug not on the [facility], [facility] or  Health refill protocol or controlled substance    Narda Goodrich RN ....................  5/17/2021   3:54 PM

## 2021-05-18 RX ORDER — TRAMADOL HYDROCHLORIDE 50 MG/1
TABLET ORAL
Qty: 120 TABLET | Refills: 0 | Status: SHIPPED | OUTPATIENT
Start: 2021-05-18 | End: 2021-05-19

## 2021-05-18 RX ORDER — LORAZEPAM 1 MG/1
TABLET ORAL
Qty: 30 TABLET | Refills: 0 | Status: SHIPPED | OUTPATIENT
Start: 2021-05-18 | End: 2021-10-26

## 2021-05-19 ENCOUNTER — OFFICE VISIT (OUTPATIENT)
Dept: FAMILY MEDICINE | Facility: OTHER | Age: 41
End: 2021-05-19
Attending: FAMILY MEDICINE
Payer: COMMERCIAL

## 2021-05-19 VITALS
RESPIRATION RATE: 14 BRPM | BODY MASS INDEX: 35.07 KG/M2 | WEIGHT: 214 LBS | HEART RATE: 96 BPM | DIASTOLIC BLOOD PRESSURE: 70 MMHG | SYSTOLIC BLOOD PRESSURE: 124 MMHG | TEMPERATURE: 98.3 F | OXYGEN SATURATION: 98 %

## 2021-05-19 DIAGNOSIS — S33.5XXA LUMBAR SPRAIN, INITIAL ENCOUNTER: ICD-10-CM

## 2021-05-19 DIAGNOSIS — R79.89 ABNORMAL TSH: ICD-10-CM

## 2021-05-19 DIAGNOSIS — M25.561 PATELLOFEMORAL ARTHRALGIA OF RIGHT KNEE: ICD-10-CM

## 2021-05-19 DIAGNOSIS — M22.2X2 PATELLOFEMORAL PAIN SYNDROME OF LEFT KNEE: ICD-10-CM

## 2021-05-19 DIAGNOSIS — M22.2X2 PATELLOFEMORAL PAIN SYNDROME OF LEFT KNEE: Primary | ICD-10-CM

## 2021-05-19 DIAGNOSIS — G56.92 NEUROPATHY, ARM, LEFT: ICD-10-CM

## 2021-05-19 LAB — TSH SERPL DL<=0.05 MIU/L-ACNC: 0.2 IU/ML (ref 0.34–5.6)

## 2021-05-19 PROCEDURE — 80307 DRUG TEST PRSMV CHEM ANLYZR: CPT | Mod: ZL | Performed by: FAMILY MEDICINE

## 2021-05-19 PROCEDURE — 84443 ASSAY THYROID STIM HORMONE: CPT | Mod: ZL | Performed by: FAMILY MEDICINE

## 2021-05-19 PROCEDURE — 99213 OFFICE O/P EST LOW 20 MIN: CPT | Performed by: FAMILY MEDICINE

## 2021-05-19 PROCEDURE — 36415 COLL VENOUS BLD VENIPUNCTURE: CPT | Mod: ZL | Performed by: FAMILY MEDICINE

## 2021-05-19 RX ORDER — TRAMADOL HYDROCHLORIDE 50 MG/1
50 TABLET ORAL EVERY 6 HOURS PRN
Qty: 120 TABLET | Refills: 5 | Status: SHIPPED | OUTPATIENT
Start: 2021-05-19 | End: 2021-12-09

## 2021-05-19 RX ORDER — GABAPENTIN 100 MG/1
100 CAPSULE ORAL 3 TIMES DAILY
Qty: 270 CAPSULE | Refills: 3 | Status: SHIPPED | OUTPATIENT
Start: 2021-05-19 | End: 2022-04-07

## 2021-05-19 RX ORDER — DESVENLAFAXINE 50 MG/1
100 TABLET, FILM COATED, EXTENDED RELEASE ORAL DAILY
COMMUNITY
End: 2023-05-15

## 2021-05-19 ASSESSMENT — ENCOUNTER SYMPTOMS
FEVER: 0
PARESTHESIAS: 0
WEAKNESS: 0
CHILLS: 0
DIZZINESS: 0
FATIGUE: 0
NUMBNESS: 0

## 2021-05-19 ASSESSMENT — ANXIETY QUESTIONNAIRES
5. BEING SO RESTLESS THAT IT IS HARD TO SIT STILL: NOT AT ALL
GAD7 TOTAL SCORE: 0
3. WORRYING TOO MUCH ABOUT DIFFERENT THINGS: NOT AT ALL
1. FEELING NERVOUS, ANXIOUS, OR ON EDGE: NOT AT ALL
7. FEELING AFRAID AS IF SOMETHING AWFUL MIGHT HAPPEN: NOT AT ALL
2. NOT BEING ABLE TO STOP OR CONTROL WORRYING: NOT AT ALL
IF YOU CHECKED OFF ANY PROBLEMS ON THIS QUESTIONNAIRE, HOW DIFFICULT HAVE THESE PROBLEMS MADE IT FOR YOU TO DO YOUR WORK, TAKE CARE OF THINGS AT HOME, OR GET ALONG WITH OTHER PEOPLE: NOT DIFFICULT AT ALL
6. BECOMING EASILY ANNOYED OR IRRITABLE: NOT AT ALL

## 2021-05-19 ASSESSMENT — PAIN SCALES - GENERAL: PAINLEVEL: MILD PAIN (3)

## 2021-05-19 ASSESSMENT — PATIENT HEALTH QUESTIONNAIRE - PHQ9: 5. POOR APPETITE OR OVEREATING: NOT AT ALL

## 2021-05-19 NOTE — NURSING NOTE
"Coming in to have her left arm checked     Has pain numbness and burning     Chief Complaint   Patient presents with     Musculoskeletal Problem     left arm pain, numbness, burning. now the right started       Initial /70   Pulse 96   Temp 98.3  F (36.8  C)   Resp 14   Wt 97.1 kg (214 lb)   SpO2 98%   BMI 35.07 kg/m   Estimated body mass index is 35.07 kg/m  as calculated from the following:    Height as of 6/12/20: 1.664 m (5' 5.5\").    Weight as of this encounter: 97.1 kg (214 lb).  Medication Reconciliation: complete    Yadira Warner LPN  "

## 2021-05-19 NOTE — PROGRESS NOTES
"Assessment & Plan     (G56.92) Neuropathy, arm, left  (primary encounter diagnosis)  Comment: description of pain is more consistent with neuropathy type pain. This could be due to cubital tunnel syndrome or less likely compartment syndrome. Physical exam did not indicate torn tendon, muscle or fracture.  Plan: NEUROLOGY ADULT REFERRAL, gabapentin         (NEURONTIN) 100 MG capsule    (S33.5XXA) Lumbar sprain, initial encounter  Comment: chronic, stable.  Plan:      (M25.561) Patellofemoral arthralgia of right knee  Comment: chronic, stable, managed with tramadol  Plan: Drug Confirmation Panel Urine with Creat,         traMADol (ULTRAM) 50 MG tablet    (R79.89) Abnormal TSH  Comment: too early to check again as last checked 3/30, will check on next visit, but feeling a lot better and less edgy since adjusting her medications  Plan: TSH    (M22.2X2) Patellofemoral pain syndrome of left knee  Comment: chronic, stable, managed with tramadol, but will lock up on here during water aerobics, plans to get it replaced in the future  Plan: traMADol (ULTRAM) 50 MG tablet               Tobacco Cessation:   reports that she has been smoking cigarettes. She has been smoking about 0.25 packs per day. She has never used smokeless tobacco.      BMI:   Estimated body mass index is 35.07 kg/m  as calculated from the following:    Height as of 6/12/20: 1.664 m (5' 5.5\").    Weight as of this encounter: 97.1 kg (214 lb).   Weight management plan: Discussed healthy diet and exercise guidelines    No follow-ups on file.     Tammie Griffin MS4  [facility]      TSH still low, will drop dose of synthroid to 137, repeat tsh IN 8 weeks.   reviewed and stable.  Alin Pimentel MD  [facility]    Karl Galloway is a 41 year old who presents for the following health issues left arm pain    Constant burning, throbbing pain on left elbow and shoulder. Gotten worse since last visit just wants it fixed. " Not sleeping well, irritable. Tries naproxen, tylenol, and tramadol but they only take the edge off. Water aerobics does not make it better or worse. No surgeries on neck, shoulder or elbow. No weakness. Worse at night. Nothing has helped it. Been going on for 5 months now. Feels like its deep in her arm, has had carpal tunnel on both wrists and both were released about 16 years ago, this feels like a different pain.    She is getting  this Saturday and just bought a new [employer] home, really enjoying her new [employer] neighbors.             Review of Systems   Constitutional: Negative for chills, fatigue and fever.   Neurological: Negative for dizziness, weakness, numbness and paresthesias.        Left elbow burning pain, feels deep, sometimes feels in left shoulder         Objective    /70   Pulse 96   Temp 98.3  F (36.8  C)   Resp 14   Wt 97.1 kg (214 lb)   SpO2 98%   BMI 35.07 kg/m    Body mass index is 35.07 kg/m .  Physical Exam  Constitutional:       Appearance: Normal appearance.   Musculoskeletal: Normal range of motion.         General: No swelling or signs of injury.      Left elbow: She exhibits normal range of motion, no swelling, no effusion, no deformity and no laceration. Tenderness found. Medial epicondyle tenderness noted.   Neurological:      General: No focal deficit present.      Mental Status: She is alert and oriented to person, place, and time.   Psychiatric:         Mood and Affect: Mood normal.         Behavior: Behavior normal.      No results found for this or any previous visit (from the past 24 hour(s)).

## 2021-05-19 NOTE — LETTER
Opioid / Opioid Plus Controlled Substance Agreement    This is an agreement between you and your provider about the safe and appropriate use of controlled substance/opioids prescribed by your care team. Controlled substances are medicines that can cause physical and mental dependence (abuse).    There are strict laws about having and using these medicines. We here at [facility] are committing to working with you in your efforts to get better. To support you in this work, we ll help you schedule regular office appointments for medicine refills. If we must cancel or change your appointment for any reason, we ll make sure you have enough medicine to last until your next appointment.     As a Provider, I will:    Listen carefully to your concerns and treat you with respect.     Recommend a treatment plan that I believe is in your best interest. This plan may involve therapies other than opioid pain medication.     Talk with you often about the possible benefits, and the risk of harm of any medicine that we prescribe for you.     Provide a plan on how to taper (discontinue or go off) using this medicine if the decision is made to stop its use.    As a Patient, I understand that opioid(s):     Are a controlled substance prescribed by my care team to help me function or work and manage my condition(s).     Are strong medicines and can cause serious side effects such as:    Drowsiness, which can seriously affect my driving ability    A lower breathing rate, enough to cause death    Harm to my thinking ability     Depression     Abuse of and addiction to this medicine    Need to be taken exactly as prescribed. Combining opioids with certain medicines or chemicals (such as illegal drugs, sedatives, sleeping pills, and benzodiazepines) can be dangerous or even fatal. If I stop opioids suddenly, I may have severe withdrawal symptoms.    Do not work for all types of pain nor for all patients. If they re not helpful, I may  be asked to stop them.        The risks, benefits and side effects of these medicine(s) were explained to me. I agree that:  1. I will take part in other treatments as advised by my care team. This may be psychiatry or counseling, physical therapy, behavioral therapy, group treatment or a referral to a specialist.     2. I will keep all my appointments. I understand that this is part of the monitoring of opioids. My care team may require an office visit for EVERY opioid/controlled substance refill. If I miss appointments or don t follow instructions, my care team may stop my medicine.    3. I will take my medicines as prescribed. I will not change the dose or schedule unless my care team tells me to. There will be no refills if I run out early.     4. I may be asked to come to the clinic and complete a urine drug test or complete a pill count at any time. If I don t give a urine sample or participate in a pill count, the care team may stop my medicine.    5. I will only receive prescriptions from this clinic for chronic pain. If I am treated by another provider for acute pain issues, I will tell them that I am taking opioid pain medication for chronic pain and that I have a treatment agreement with this provider. I will inform my [facility] care team within one business day if I am given a prescription for any pain medication by another healthcare provider. My [facility] care team can contact other providers and pharmacists about my use of any medicines.    6. It is up to me to make sure that I don t run out of my medicines on weekends or holidays. If my care team is willing to refill my opioid prescription without a visit, I must request refills only during office hours. Refills may take up to 3 business days to process. I will use one pharmacy to fill all my opioid and other controlled substance prescriptions. I will notify the clinic about any changes to my insurance or medication  availability.    7. I am responsible for my prescriptions. If the medicine/prescription is lost, stolen or destroyed, it will not be replaced. I also agree not to share controlled substance medicines with anyone.    8. I am aware I should not use any illegal or recreational drugs. I agree not to drink alcohol unless my care team says I can.       9. If I enroll in the [place] [facility] Cann[facility] program, I will tell my care team prior to my next refill.     10. I will tell my care team right away if I become pregnant, have a new medical problem treated outside of my regular clinic, or have a change in my medications.    11. I understand that this medicine can affect my thinking, judgment and reaction time. Alcohol and drugs affect the brain and body, which can affect the safety of my driving. Being under the influence of alcohol or drugs can affect my decision-making, behaviors, personal safety, and the safety of others. Driving while impaired (DWI) can occur if a person is driving, operating, or in physical control of a car, motorcycle, boat, snowmobile, ATV, motorbike, off-road vehicle, or any other motor vehicle ([place] Statute 169A.20). I understand the risk if I choose to drive or operate any vehicle or machinery.    I understand that if I do not follow any of the conditions above, my prescriptions or treatment may be stopped or changed.          Opioids  What You Need to Know    What are opioids?   Opioids are pain medicines that must be prescribed by a doctor. They are also known as narcotics.     Examples are:   1. morphine (MS Contin, Elena)  2. oxycodone (Oxycontin)  3. oxycodone and acetaminophen (Percocet)  4. hydrocodone and acetaminophen (Vicodin, Norco)   5. fentanyl patch (Duragesic)   6. hydromorphone (Dilaudid)   7. methadone  8. codeine (Tylenol #3)     What do opioids do well?   Opioids are best for severe short-term pain such as after a surgery or injury. They may work well for cancer pain. They may  help some people with long-lasting (chronic) pain.     What do opioids NOT do well?   Opioids never get rid of pain entirely, and they don t work well for most patients with chronic pain. Opioids don t reduce swelling, one of the causes of pain.                                    Other ways to manage chronic pain and improve function include:       Treat the health problem that may be causing pain    Anti-inflammation medicines, which reduce swelling and tenderness, such as ibuprofen (Advil, Motrin) or naproxen (Aleve)    Acetaminophen (Tylenol)    Antidepressants and anti-seizure medicines, especially for nerve pain    Topical treatments such as patches or creams    Injections or nerve blocks    Chiropractic or osteopathic treatment    Acupuncture, massage, deep breathing, meditation, visual imagery, aromatherapy    Use heat or ice at the pain site    Physical therapy     Exercise    Stop smoking    Take part in therapy       Risks and side effects     Talk to your doctor before you start or decide to keep taking opioids. Possible side effects include:      Lowering your breathing rate enough to cause death    Overdose, including death, especially if taking higher than prescribed doses    Worse depression symptoms; less pleasure in things you usually enjoy    Feeling tired or sluggish    Slower thoughts or cloudy thinking    Being more sensitive to pain over time; pain is harder to control    Trouble sleeping or restless sleep    Changes in hormone levels (for example, less testosterone)    Changes in sex drive or ability to have sex    Constipation    Unsafe driving    Itching and sweating    Dizziness    Nausea, throwing up and dry mouth    What else should I know about opioids?    Opioids may lead to dependence, tolerance, or addiction.      Dependence means that if you stop or reduce the medicine too quickly, you will have withdrawal symptoms. These include loose poop (diarrhea), jitters, flu-like symptoms,  nervousness and tremors. Dependence is not the same as addiction.                       Tolerance means needing higher doses over time to get the same effect. This may increase the chance of serious side effects.      Addiction is when people improperly use a substance that harms their body, their mind or their relations with others. Use of opiates can cause a relapse of addiction if you have a history of drug or alcohol abuse.      People who have used opioids for a long time may have a lower quality of life, worse depression, higher levels of pain and more visits to doctors.    You can overdose on opioids. Take these steps to lower your risk of overdose:    1. Recognize the signs:  Signs of overdose include decrease or loss of consciousness (blackout), slowed breathing, trouble waking up and blue lips. If someone is worried about overdose, they should call 911.    2. Talk to your doctor about Narcan (naloxone).   If you are at risk for overdose, you may be given a prescription for Narcan. This medicine very quickly reverses the effects of opioids.   If you overdose, a friend or family member can give you Narcan while waiting for the ambulance. They need to know the signs of overdose and how to give Narcan.     3. Don't use alcohol or street drugs.   Taking them with opioids can cause death.    4. Do not take any of these medicines unless your doctor says it s OK. Taking these with opioids can cause death:    Benzodiazepines, such as lorazepam (Ativan), alprazolam (Xanax) or diazepam (Valium)    Muscle relaxers, such as cyclobenzaprine (Flexeril)    Sleeping pills like zolpidem (Ambien)     Other opioids      How to keep you and other people safe while taking opioids:    1. Never share your opioids with others.  Opioid medicines are regulated by the Drug Enforcement Agency (JOSEP). Selling or sharing medications is a criminal act.    2. Be sure to store opioids in a secure place, locked up if possible. Young children  can easily swallow them and overdose.    3. When you are traveling with your medicines, keep them in the original bottles. If you use a pill box, be sure you also carry a copy of your medicine list from your clinic or pharmacy.    4. Safe disposal of opioids    Most pharmacies have places to get rid of medicine, called disposal kiosks. Medicine disposal options are also available in every [facility]. Search your county and  medication disposal  to find more options. You can find more details at:  [email or web address]-[email or web address]-medications     I agree that my provider, clinic care team, and pharmacy may work with any city, state or federal law enforcement agency that investigates the misuse, sale, or other diversion of my controlled medicine. I will allow my provider to discuss my care with, or share a copy of, this agreement with any other treating provider, pharmacy or emergency room where I receive care.    I have read this agreement and have asked questions about anything I did not understand.    _______________________________________________________  Patient Signature - Laverne Nguyen _____________________                   Date     _______________________________________________________  Provider Signature - Alin Pimentel MD   _____________________                   Date     _______________________________________________________  Witness Signature (required if provider not present while patient signing)   _____________________                   Date

## 2021-05-20 RX ORDER — LEVOTHYROXINE SODIUM 137 UG/1
137 TABLET ORAL DAILY
Qty: 90 TABLET | Refills: 3 | Status: SHIPPED | OUTPATIENT
Start: 2021-05-20 | End: 2021-06-01

## 2021-05-20 ASSESSMENT — ANXIETY QUESTIONNAIRES: GAD7 TOTAL SCORE: 0

## 2021-05-22 LAB
CREAT UR-MCNC: 54 MG/DL
N-NORTRAMADOL/CREAT UR CFM: 5519 NG/MG{CREAT}
O-NORTRAMADOL UR CFM-MCNC: 2980 NG/ML
RPT COMMENT: ABNORMAL
TRAMADOL CTO UR CFM-MCNC: 536 NG/ML
TRAMADOL/CREAT UR: 993 NG/MG{CREAT}

## 2021-06-01 DIAGNOSIS — R79.89 ABNORMAL TSH: ICD-10-CM

## 2021-06-01 RX ORDER — LEVOTHYROXINE SODIUM 137 UG/1
137 TABLET ORAL DAILY
Qty: 90 TABLET | Refills: 3 | Status: SHIPPED | OUTPATIENT
Start: 2021-06-01 | End: 2022-09-14

## 2021-06-15 ENCOUNTER — OFFICE VISIT (OUTPATIENT)
Dept: NEUROLOGY | Facility: OTHER | Age: 41
End: 2021-06-15
Attending: PSYCHIATRY & NEUROLOGY
Payer: COMMERCIAL

## 2021-06-15 VITALS
TEMPERATURE: 98.6 F | SYSTOLIC BLOOD PRESSURE: 122 MMHG | OXYGEN SATURATION: 98 % | HEIGHT: 66 IN | BODY MASS INDEX: 35.2 KG/M2 | DIASTOLIC BLOOD PRESSURE: 82 MMHG | HEART RATE: 83 BPM | WEIGHT: 219 LBS | RESPIRATION RATE: 18 BRPM

## 2021-06-15 DIAGNOSIS — M79.622 PAIN OF LEFT UPPER ARM: Primary | ICD-10-CM

## 2021-06-15 DIAGNOSIS — M79.632 PAIN OF LEFT FOREARM: ICD-10-CM

## 2021-06-15 PROCEDURE — 99204 OFFICE O/P NEW MOD 45 MIN: CPT | Mod: 25 | Performed by: PSYCHIATRY & NEUROLOGY

## 2021-06-15 PROCEDURE — 95886 MUSC TEST DONE W/N TEST COMP: CPT | Performed by: PSYCHIATRY & NEUROLOGY

## 2021-06-15 PROCEDURE — 95911 NRV CNDJ TEST 9-10 STUDIES: CPT | Performed by: PSYCHIATRY & NEUROLOGY

## 2021-06-15 ASSESSMENT — MIFFLIN-ST. JEOR: SCORE: 1681.16

## 2021-06-15 NOTE — NURSING NOTE
Patient presents for a consult for Upper extremity pain.  Lisbeth Pleitez LPN.........................6/15/2021  12:59 PM

## 2021-06-15 NOTE — LETTER
6/15/2021         RE: Laverne Nguyen  [address] [facility] [address] [address]        Dear Colleague,    Thank you for referring your patient, Laverne Nguyen, to the [facility]. Please see a copy of my visit note below.    Visit Date: 06/15/2021    HISTORY:  The patient suggests an unlikely combination of symptoms, primarily band-like pain involving the distal arm and proximal forearm.  Symptoms are limited to the nondominant left side.  She has a vague perception of weakness in the left upper extremity, but this sounds to be mild.  Symptoms have been present for about 5 months.  She does not complain of neck pain and she is asymptomatic on the right.  There has been no obvious injury.    Past medical history is primarily significant for morbid obesity, which led to the performance of gastric bypass surgery some years ago.  The patient has previously had bilateral carpal tunnel release.  There is a history of B12 deficiency.    REVIEW OF SYSTEMS:  A 10-system review of systems other than the above is unremarkable.      PHYSICAL EXAMINATION:  The patient is 65 inches tall and weighs 219 pounds.  Blood pressure is 122/82.  Gait is normal.  Strength is 5/5 bilaterally for the intrinsic hand muscles, finger extensors and flexors, biceps and triceps.  Reflexes are 1+/4 bilaterally for the biceps, triceps, and brachioradialis tendons.  Pinprick is well preserved.    NERVE CONDUCTION STUDIES:  Motor nerve conduction testing was performed for the left median and ulnar nerves.  Distal latencies, amplitudes, conduction velocities, and H reflex latencies were normal.    Orthodromic mixed conduction studies were performed for the left median and ulnar nerves.  Antidromic sensory nerve conduction studies were performed for the second digital, fifth digital and radial nerves.  Latencies, amplitudes, and conduction velocities were well within normal limits throughout.    MONOPOLAR EMG NEEDLE  EXAMINATION:  Monopolar needle exam was performed for the left abductor pollicis brevis, first dorsal interosseous, extensor digitorum communis, flexor carpi radialis, triceps, and brachioradialis.  All of the tested muscles showed normal insertional activity.  Motor units were normal in size with normal recruitment and interference.    IMPRESSION:  The patient is neurologically intact with respect to the left upper extremity.  There is no evidence for focal neuropathy or radiculopathy.  The patient's symptoms are nonanatomic at least from a neurological standpoint.     Findings were reviewed with the patient.    Ishan Olivera MD        D: 06/15/2021   T: 06/15/2021   MT: [email or web address]    Name:     LUCIANA PETERS  MRN:      [id number]        Account:    [id number]   :      1980           Visit Date: 06/15/2021     Document: [id number]      Again, thank you for allowing me to participate in the care of your patient.        Sincerely,        Ishan Olivera MD

## 2021-06-17 NOTE — PROGRESS NOTES
Visit Date: 06/15/2021    HISTORY:  The patient suggests an unlikely combination of symptoms, primarily band-like pain involving the distal arm and proximal forearm.  Symptoms are limited to the nondominant left side.  She has a vague perception of weakness in the left upper extremity, but this sounds to be mild.  Symptoms have been present for about 5 months.  She does not complain of neck pain and she is asymptomatic on the right.  There has been no obvious injury.    Past medical history is primarily significant for morbid obesity, which led to the performance of gastric bypass surgery some years ago.  The patient has previously had bilateral carpal tunnel release.  There is a history of B12 deficiency.    REVIEW OF SYSTEMS:  A 10-system review of systems other than the above is unremarkable.      PHYSICAL EXAMINATION:  The patient is 65 inches tall and weighs 219 pounds.  Blood pressure is 122/82.  Gait is normal.  Strength is 5/5 bilaterally for the intrinsic hand muscles, finger extensors and flexors, biceps and triceps.  Reflexes are 1+/4 bilaterally for the biceps, triceps, and brachioradialis tendons.  Pinprick is well preserved.    NERVE CONDUCTION STUDIES:  Motor nerve conduction testing was performed for the left median and ulnar nerves.  Distal latencies, amplitudes, conduction velocities, and H reflex latencies were normal.    Orthodromic mixed conduction studies were performed for the left median and ulnar nerves.  Antidromic sensory nerve conduction studies were performed for the second digital, fifth digital and radial nerves.  Latencies, amplitudes, and conduction velocities were well within normal limits throughout.    MONOPOLAR EMG NEEDLE EXAMINATION:  Monopolar needle exam was performed for the left abductor pollicis brevis, first dorsal interosseous, extensor digitorum communis, flexor carpi radialis, triceps, and brachioradialis.  All of the tested muscles showed normal insertional activity.   Motor units were normal in size with normal recruitment and interference.    IMPRESSION:  The patient is neurologically intact with respect to the left upper extremity.  There is no evidence for focal neuropathy or radiculopathy.  The patient's symptoms are nonanatomic at least from a neurological standpoint.     Findings were reviewed with the patient.    Ishan Olivera MD        D: 06/15/2021   T: 06/15/2021   MT: [email or web address]/job    Name:     LUCIANA PETERSGuillermina  MRN:      [id number]        Account:    [id number]   :      1980           Visit Date: 06/15/2021     Document: [id number]

## 2021-07-05 ENCOUNTER — MYC MEDICAL ADVICE (OUTPATIENT)
Dept: FAMILY MEDICINE | Facility: OTHER | Age: 41
End: 2021-07-05

## 2021-07-06 ENCOUNTER — MYC MEDICAL ADVICE (OUTPATIENT)
Dept: FAMILY MEDICINE | Facility: OTHER | Age: 41
End: 2021-07-06

## 2021-07-15 DIAGNOSIS — E53.8 B12 DEFICIENCY: ICD-10-CM

## 2021-07-16 RX ORDER — CYANOCOBALAMIN 1000 UG/ML
INJECTION, SOLUTION INTRAMUSCULAR; SUBCUTANEOUS
Qty: 3 ML | Refills: 3 | Status: SHIPPED | OUTPATIENT
Start: 2021-07-16 | End: 2022-12-16

## 2021-07-16 NOTE — TELEPHONE ENCOUNTER
Prescription approved per [facility] Refill Protocol.  Kiara Lamb RN ....................  7/16/2021   11:09 AM

## 2021-08-05 ENCOUNTER — MYC MEDICAL ADVICE (OUTPATIENT)
Dept: FAMILY MEDICINE | Facility: OTHER | Age: 41
End: 2021-08-05

## 2021-08-08 DIAGNOSIS — M25.561 ACUTE PAIN OF RIGHT KNEE: ICD-10-CM

## 2021-08-10 RX ORDER — NAPROXEN 500 MG/1
TABLET ORAL
Qty: 60 TABLET | Refills: 3 | OUTPATIENT
Start: 2021-08-10

## 2021-08-10 NOTE — TELEPHONE ENCOUNTER
The original prescription was discontinued on 6/12/2020 by Gosselin, Norma J., LPN for the following reason: Stopped by Patient.   Unable to complete prescription refill per RN Medication Refill Policy.................... Kiara Lamb RN ....................  8/10/2021   9:49 AM

## 2021-10-03 ENCOUNTER — HEALTH MAINTENANCE LETTER (OUTPATIENT)
Age: 41
End: 2021-10-03

## 2021-10-18 ENCOUNTER — ALLIED HEALTH/NURSE VISIT (OUTPATIENT)
Dept: FAMILY MEDICINE | Facility: OTHER | Age: 41
End: 2021-10-18
Attending: PHYSICIAN ASSISTANT
Payer: COMMERCIAL

## 2021-10-18 DIAGNOSIS — R51.9 HEADACHE: Primary | ICD-10-CM

## 2021-10-18 PROCEDURE — C9803 HOPD COVID-19 SPEC COLLECT: HCPCS

## 2021-10-18 PROCEDURE — U0003 INFECTIOUS AGENT DETECTION BY NUCLEIC ACID (DNA OR RNA); SEVERE ACUTE RESPIRATORY SYNDROME CORONAVIRUS 2 (SARS-COV-2) (CORONAVIRUS DISEASE [COVID-19]), AMPLIFIED PROBE TECHNIQUE, MAKING USE OF HIGH THROUGHPUT TECHNOLOGIES AS DESCRIBED BY CMS-2020-01-R: HCPCS | Mod: ZL

## 2021-10-20 LAB — SARS-COV-2 RNA RESP QL NAA+PROBE: NEGATIVE

## 2021-10-24 DIAGNOSIS — F33.0 MAJOR DEPRESSIVE DISORDER, RECURRENT, MILD (H): ICD-10-CM

## 2021-10-26 RX ORDER — LORAZEPAM 1 MG/1
TABLET ORAL
Qty: 30 TABLET | Refills: 0 | Status: SHIPPED | OUTPATIENT
Start: 2021-10-26 | End: 2021-12-09

## 2021-10-26 NOTE — TELEPHONE ENCOUNTER
Requested Prescriptions   Pending Prescriptions Disp Refills     LORazepam (ATIVAN) 1 MG tablet [Pharmacy Med Name: LORAZEPAM 1MG TABLETS] 30 tablet      Sig: TAKE 1 TABLET(1 MG) BY MOUTH EVERY 6 HOURS       There is no refill protocol information for this order        LOV: 05/19/2021 ([facility])  Unable to complete prescription refill per RN Medication Refill Policy.................... Kiara Lamb RN ....................  10/26/2021   9:41 AM

## 2021-12-09 ENCOUNTER — MYC MEDICAL ADVICE (OUTPATIENT)
Dept: FAMILY MEDICINE | Facility: OTHER | Age: 41
End: 2021-12-09
Payer: COMMERCIAL

## 2021-12-09 ENCOUNTER — VIRTUAL VISIT (OUTPATIENT)
Dept: FAMILY MEDICINE | Facility: OTHER | Age: 41
End: 2021-12-09
Attending: FAMILY MEDICINE
Payer: COMMERCIAL

## 2021-12-09 DIAGNOSIS — M25.561 PATELLOFEMORAL ARTHRALGIA OF RIGHT KNEE: ICD-10-CM

## 2021-12-09 DIAGNOSIS — F33.0 MAJOR DEPRESSIVE DISORDER, RECURRENT, MILD (H): ICD-10-CM

## 2021-12-09 DIAGNOSIS — M22.2X2 PATELLOFEMORAL PAIN SYNDROME OF LEFT KNEE: ICD-10-CM

## 2021-12-09 PROCEDURE — 99212 OFFICE O/P EST SF 10 MIN: CPT | Performed by: FAMILY MEDICINE

## 2021-12-09 RX ORDER — LORAZEPAM 1 MG/1
1 TABLET ORAL 2 TIMES DAILY PRN
Qty: 30 TABLET | Refills: 5 | Status: SHIPPED | OUTPATIENT
Start: 2021-12-09 | End: 2022-06-13

## 2021-12-09 RX ORDER — TRAMADOL HYDROCHLORIDE 50 MG/1
50 TABLET ORAL EVERY 6 HOURS PRN
Qty: 120 TABLET | Refills: 5 | Status: SHIPPED | OUTPATIENT
Start: 2021-12-09 | End: 2022-06-13

## 2021-12-09 ASSESSMENT — ANXIETY QUESTIONNAIRES
1. FEELING NERVOUS, ANXIOUS, OR ON EDGE: NOT AT ALL
GAD7 TOTAL SCORE: 0
2. NOT BEING ABLE TO STOP OR CONTROL WORRYING: NOT AT ALL
7. FEELING AFRAID AS IF SOMETHING AWFUL MIGHT HAPPEN: NOT AT ALL
6. BECOMING EASILY ANNOYED OR IRRITABLE: NOT AT ALL
5. BEING SO RESTLESS THAT IT IS HARD TO SIT STILL: NOT AT ALL
3. WORRYING TOO MUCH ABOUT DIFFERENT THINGS: NOT AT ALL
IF YOU CHECKED OFF ANY PROBLEMS ON THIS QUESTIONNAIRE, HOW DIFFICULT HAVE THESE PROBLEMS MADE IT FOR YOU TO DO YOUR WORK, TAKE CARE OF THINGS AT HOME, OR GET ALONG WITH OTHER PEOPLE: NOT DIFFICULT AT ALL

## 2021-12-09 ASSESSMENT — PATIENT HEALTH QUESTIONNAIRE - PHQ9: 5. POOR APPETITE OR OVEREATING: NOT AT ALL

## 2021-12-09 NOTE — TELEPHONE ENCOUNTER
Patient is scheduled today at 3 pm for in clinic visit, wanting to change to video and instead for injections just med refills. Is TJP ok to change visit?   Marilee Ortiz LPN .............12/9/2021     11:34 AM

## 2021-12-09 NOTE — NURSING NOTE
Doing a telephone visit for medication check up    Yadira Warner LPN on 12/9/2021 at 2:51 PM    
Home

## 2021-12-09 NOTE — PROGRESS NOTES
"Laverne is a 41 year old who is being evaluated via a billable telephone visit.      What phone number would you like to be contacted at? [phone]  How would you like to obtain your AVS? Mail a copy    Assessment & Plan   Problem List Items Addressed This Visit        Nervous and Auditory    Patellofemoral pain syndrome of left knee    Relevant Medications    traMADol (ULTRAM) 50 MG tablet    Patellofemoral arthralgia of right knee    Relevant Medications    traMADol (ULTRAM) 50 MG tablet       Behavioral    Major depressive disorder, recurrent, mild (H)    Relevant Medications    LORazepam (ATIVAN) 1 MG tablet         Currently low risk for abuse but has some risk factors for abuse.  Follow up in 6 months or so, next in person.  reviewe             Tobacco Cessation:   reports that she has been smoking cigarettes. She has been smoking about 0.25 packs per day. She has never used smokeless tobacco.  Tobacco Cessation Action Plan: Information offered: Patient not interested at this time    BMI:   Estimated body mass index is 35.62 kg/m  as calculated from the following:    Height as of 6/15/21: 1.67 m (5' 5.75\").    Weight as of 6/15/21: 99.3 kg (219 lb).   Weight management plan: Discussed healthy diet and exercise guidelines        Return in about 6 months (around 6/9/2022).    Alin Pimentel MD  [facility]    Subjective   Laverne is a 41 year old who presents for the following health issues with getting medication     HPI follow up on pan meds.  She is working in [employer], and others are out with COVID.  Short staffed. Her knee pain was not bad, but with the colder weather notes some mild pain. Last injection was 6 months ago.  Was scheduled for an injection today, but could not get out of work.  Needs the tramadol and ativan filled.  More anxiety with COVID, using 1/2 tab twice daily.  Had stopped her pristiq last fall, struggled so resumed it.  Is off probation now.  Has not been using any " "EtOH at all.      Current Outpatient Medications   Medication     Cholecalciferol (VITAMIN D3) 2000 units CAPS     cyanocobalamin (CYANOCOBALAMIN) 1000 MCG/ML injection     desvenlafaxine (PRISTIQ) 50 MG 24 hr tablet     gabapentin (NEURONTIN) 100 MG capsule     levothyroxine (SYNTHROID/LEVOTHROID) 137 MCG tablet     LORazepam (ATIVAN) 1 MG tablet     metroNIDAZOLE (METROGEL) 0.75 % external gel     Multiple Vitamin (MULTI-VITAMINS) TABS     syringe/needle, disp, (MEDSAVER SYRINGE) 25G X 1\" 3 ML MISC     traMADol (ULTRAM) 50 MG tablet     traZODone (DESYREL) 100 MG tablet     vitamin B complex with vitamin C (VITAMIN  B COMPLEX) tablet     No current facility-administered medications for this visit.                   Review of Systems         Objective           Vitals:  No vitals were obtained today due to virtual visit.    Physical Exam   healthy, alert and no distress  PSYCH: Alert and oriented times 3; coherent speech, normal   rate and volume, able to articulate logical thoughts, able   to abstract reason, no tangential thoughts, no hallucinations   or delusions  Her affect is normal  RESP: No cough, no audible wheezing, able to talk in full sentences  Remainder of exam unable to be completed due to telephone visits                Phone call duration: 15 minutes    "

## 2021-12-10 ASSESSMENT — ANXIETY QUESTIONNAIRES: GAD7 TOTAL SCORE: 0

## 2021-12-31 ENCOUNTER — OFFICE VISIT (OUTPATIENT)
Dept: FAMILY MEDICINE | Facility: OTHER | Age: 41
End: 2021-12-31
Attending: NURSE PRACTITIONER
Payer: COMMERCIAL

## 2021-12-31 VITALS
SYSTOLIC BLOOD PRESSURE: 156 MMHG | HEART RATE: 72 BPM | DIASTOLIC BLOOD PRESSURE: 98 MMHG | OXYGEN SATURATION: 98 % | WEIGHT: 245.7 LBS | RESPIRATION RATE: 12 BRPM | TEMPERATURE: 98.2 F | BODY MASS INDEX: 39.96 KG/M2

## 2021-12-31 DIAGNOSIS — I10 HYPERTENSION, UNSPECIFIED TYPE: Primary | ICD-10-CM

## 2021-12-31 LAB
ANION GAP SERPL CALCULATED.3IONS-SCNC: 9 MMOL/L (ref 3–14)
BASOPHILS # BLD AUTO: 0 10E3/UL (ref 0–0.2)
BASOPHILS NFR BLD AUTO: 1 %
BUN SERPL-MCNC: 11 MG/DL (ref 7–25)
CALCIUM SERPL-MCNC: 10 MG/DL (ref 8.6–10.3)
CHLORIDE BLD-SCNC: 102 MMOL/L (ref 98–107)
CO2 SERPL-SCNC: 26 MMOL/L (ref 21–31)
CREAT SERPL-MCNC: 0.65 MG/DL (ref 0.6–1.2)
EOSINOPHIL # BLD AUTO: 0.1 10E3/UL (ref 0–0.7)
EOSINOPHIL NFR BLD AUTO: 1 %
ERYTHROCYTE [DISTWIDTH] IN BLOOD BY AUTOMATED COUNT: 12.1 % (ref 10–15)
GFR SERPL CREATININE-BSD FRML MDRD: >90 ML/MIN/1.73M2
GLUCOSE BLD-MCNC: 97 MG/DL (ref 70–105)
HCT VFR BLD AUTO: 41 % (ref 35–47)
HGB BLD-MCNC: 14.1 G/DL (ref 11.7–15.7)
IMM GRANULOCYTES # BLD: 0 10E3/UL
IMM GRANULOCYTES NFR BLD: 0 %
LYMPHOCYTES # BLD AUTO: 2.2 10E3/UL (ref 0.8–5.3)
LYMPHOCYTES NFR BLD AUTO: 27 %
MCH RBC QN AUTO: 32.6 PG (ref 26.5–33)
MCHC RBC AUTO-ENTMCNC: 34.4 G/DL (ref 31.5–36.5)
MCV RBC AUTO: 95 FL (ref 78–100)
MONOCYTES # BLD AUTO: 0.7 10E3/UL (ref 0–1.3)
MONOCYTES NFR BLD AUTO: 8 %
NEUTROPHILS # BLD AUTO: 5 10E3/UL (ref 1.6–8.3)
NEUTROPHILS NFR BLD AUTO: 63 %
NRBC # BLD AUTO: 0 10E3/UL
NRBC BLD AUTO-RTO: 0 /100
PLATELET # BLD AUTO: 237 10E3/UL (ref 150–450)
POTASSIUM BLD-SCNC: 4.2 MMOL/L (ref 3.5–5.1)
RBC # BLD AUTO: 4.33 10E6/UL (ref 3.8–5.2)
SODIUM SERPL-SCNC: 137 MMOL/L (ref 134–144)
WBC # BLD AUTO: 8 10E3/UL (ref 4–11)

## 2021-12-31 PROCEDURE — 85025 COMPLETE CBC W/AUTO DIFF WBC: CPT | Mod: ZL | Performed by: NURSE PRACTITIONER

## 2021-12-31 PROCEDURE — 80048 BASIC METABOLIC PNL TOTAL CA: CPT | Mod: ZL | Performed by: NURSE PRACTITIONER

## 2021-12-31 PROCEDURE — 99214 OFFICE O/P EST MOD 30 MIN: CPT | Performed by: NURSE PRACTITIONER

## 2021-12-31 PROCEDURE — 36415 COLL VENOUS BLD VENIPUNCTURE: CPT | Mod: ZL | Performed by: NURSE PRACTITIONER

## 2021-12-31 PROCEDURE — 93000 ELECTROCARDIOGRAM COMPLETE: CPT | Performed by: INTERNAL MEDICINE

## 2021-12-31 RX ORDER — LISINOPRIL AND HYDROCHLOROTHIAZIDE 12.5; 2 MG/1; MG/1
2 TABLET ORAL DAILY
Qty: 120 TABLET | Refills: 0 | Status: SHIPPED | OUTPATIENT
Start: 2021-12-31 | End: 2022-03-01

## 2021-12-31 ASSESSMENT — PAIN SCALES - GENERAL: PAINLEVEL: NO PAIN (0)

## 2021-12-31 NOTE — PROGRESS NOTES
ASSESSMENT/PLAN:     I have reviewed the nursing notes.  I have reviewed the findings, diagnosis, plan and need for follow up with the patient.      1. Hypertension, unspecified type    - EKG 12-lead, tracing only (Same Day)  - CBC and Differential  - Basic Metabolic Panel  - lisinopril-hydrochlorothiazide (ZESTORETIC) 20-12.5 MG tablet; Take 2 tablets by mouth daily  Dispense: 120 tablet; Refill: 0    CBC - normal  BMP - normal  EKG - initial read of normal sinus rhythm    Patient restarted on her previous prescribed anti-hypertensive medication - Lisinopril/HCTZ at previous dose of 40/25 mg daily, a 2 month supply was prescribed, patient will follow up with her PCP within 1 to 2 months for recheck and further refills.  Patient will continue to check her BP's at home but only 2 to 3 times per week and bring the readings to her follow up appointment.    Discussed with patient her symptoms of palpitations and chest pressure that she experiences when she is stressed or anxious is likely caused by her anxiety.  Recommend she try to manage/reduce her stress levels and follow up with her PCP for further management of her anxiety and depression as she self weaned off her medication (Pristiq).      Patient interested in getting her Covid vaccinations but with lots of questions and concerns, time was spent today in discussion and answering questions.   Patient encouraged to call/schedule an appointment to get her vaccine if she wishes to proceed with vaccination.          I explained my diagnostic considerations and recommendations to the patient, who voiced understanding and agreement with the treatment plan. All questions were answered. We discussed potential side effects of any prescribed or recommended therapies, as well as expectations for response to treatments.    Thalia Bloom NP  [facility] AND [facility]      SUBJECTIVE:   Laverne Hanson is a 41 year old female who presents to clinic today for the  following health issues:  High blood pressure    HPI  She has been checking her blood pressure for the past few weeks.  Her readings have been consistently around 140's-150/105-118.  The highest has been 175/115.  She was previously on Lisinopril/HCTZ 20-12.5 mg - 2 tabs daily.  States she also sometimes has a sensation of flutter in her chest.  States she does get chest tightness in the center of her chest that last a few hours when she gets really worked up, she takes Ativan when this happens.  No headaches, light headedness, dizziness or syncope.  No calf pain or swelling, no lower extremity edema.  No cough or shortness of breath.  No fevers.  Frequent sweats and chills.    States she has gained about 20 pounds over the past 2 years.  States she has been under a lot of increased stress at work and she get anxious and worked up.  She is also stressed as her  is dealing with cardiac health issues.    Weaned self off depression medications (pristiq) as she thought it wasn't working over time.   She has a lot of questions about the Covid vaccine, she is not vaccinated and has decided she wants to get vaccinated.       Past Medical History:   Diagnosis Date     Cannabis abuse, uncomplicated     use found on urine drug screen     Encounter for general adult medical examination without abnormal findings     No Comments Provided     Essential (primary) hypertension          Gastro-esophageal reflux disease without esophagitis     No Comments Provided     Low back pain     No Comments Provided     Morbid (severe) obesity due to excess calories (H)     07,with a body mass index of 51     Other specified anxiety disorders     No Comments Provided     Otitis externa of both ears     Recurrent     Personal history of other medical treatment (CODE)      I, para 1.     Tobacco use     No Comments Provided     Past Surgical History:   Procedure Laterality Date     ADENOIDECTOMY           ANKLE  "SURGERY      8/14     COLONOSCOPY  01/09/2014    hyperplastic, 10 year follow up     LAPAROSCOPIC TUBAL LIGATION      2009     OTHER SURGICAL HISTORY      10/86,45135.0,LA CREATE EARDRUM OPENING GEN ANESTH     OTHER SURGICAL HISTORY      1980,60228.0,LA CREATE EARDRUM OPENING GEN ANESTH     OTHER SURGICAL HISTORY      1992,896222,OTHER, with left PE tube     OTHER SURGICAL HISTORY      01/94,343527,OTHER     OTHER SURGICAL HISTORY      05/22/07,654826,OTHER, for obesity, [place]     Social History     Tobacco Use     Smoking status: Current Some Day Smoker     Packs/day: 0.25     Types: Cigarettes     Smokeless tobacco: Never Used   Substance Use Topics     Alcohol use: No     Current Outpatient Medications   Medication Sig Dispense Refill     levothyroxine (SYNTHROID/LEVOTHROID) 137 MCG tablet Take 1 tablet (137 mcg) by mouth daily 90 tablet 3     Multiple Vitamin (MULTI-VITAMINS) TABS Take 1 tablet by mouth       vitamin B complex with vitamin C (VITAMIN  B COMPLEX) tablet Take 1 tablet by mouth daily       Cholecalciferol (VITAMIN D3) 2000 units CAPS Take 1 tablet by mouth daily (Patient not taking: Reported on 12/31/2021) 30 capsule 11     cyanocobalamin (CYANOCOBALAMIN) 1000 MCG/ML injection ADMINISTER 1 ML(1000 MCG) IN THE MUSCLE EVERY 30 DAYS (Patient not taking: Reported on 12/31/2021) 3 mL 3     desvenlafaxine (PRISTIQ) 50 MG 24 hr tablet Take 50 mg by mouth daily (Patient not taking: Reported on 12/31/2021)       gabapentin (NEURONTIN) 100 MG capsule Take 1 capsule (100 mg) by mouth 3 times daily (Patient not taking: Reported on 12/31/2021) 270 capsule 3     LORazepam (ATIVAN) 1 MG tablet Take 1 tablet (1 mg) by mouth 2 times daily as needed for anxiety (Patient not taking: Reported on 12/31/2021) 30 tablet 5     metroNIDAZOLE (METROGEL) 0.75 % external gel Apply topically 2 times daily (Patient not taking: Reported on 12/31/2021) 45 g 3     syringe/needle, disp, (MEDSAVER SYRINGE) 25G X 1\" " 3 ML MISC Inject 1 each into the muscle every 30 days (Patient not taking: Reported on 12/31/2021) 12 each 0     traMADol (ULTRAM) 50 MG tablet Take 1 tablet (50 mg) by mouth every 6 hours as needed for severe pain (Patient not taking: Reported on 12/31/2021) 120 tablet 5     traZODone (DESYREL) 100 MG tablet Take 100 mg by mouth At Bedtime (Patient not taking: Reported on 12/31/2021)       Allergies   Allergen Reactions     Amoxicillin Other (See Comments)     Pt reports it is ineffective for her personally         Past medical history, past surgical history, current medications and allergies reviewed and accurate to the best of my knowledge.        OBJECTIVE:     BP (!) 156/98 (BP Location: Left arm, Patient Position: Sitting, Cuff Size: Adult Large)   Pulse 72   Temp 98.2  F (36.8  C)   Resp 12   Wt 111.4 kg (245 lb 11.2 oz)   SpO2 98%   Breastfeeding No   BMI 39.96 kg/m    Body mass index is 39.96 kg/m .     Physical Exam  General Appearance: Well appearing adult female, appropriate appearance for age. No acute distress  Respiratory: normal chest wall and respirations.  Normal effort.  Clear to auscultation bilaterally, no wheezing, crackles or rhonchi.  No increased work of breathing.  No cough appreciated.  Cardiac: RRR with no murmurs.  No lower extremity edema.    Musculoskeletal:  Equal movement of bilateral upper extremities.  Equal movement of bilateral lower extremities.  Normal gait.    Psychological: normal affect, alert, oriented, and pleasant.     Imaging and Labs:  Results for orders placed or performed in visit on 12/31/21   Basic Metabolic Panel     Status: Normal   Result Value Ref Range    Sodium 137 134 - 144 mmol/L    Potassium 4.2 3.5 - 5.1 mmol/L    Chloride 102 98 - 107 mmol/L    Carbon Dioxide (CO2) 26 21 - 31 mmol/L    Anion Gap 9 3 - 14 mmol/L    Urea Nitrogen 11 7 - 25 mg/dL    Creatinine 0.65 0.60 - 1.20 mg/dL    Calcium 10.0 8.6 - 10.3 mg/dL    Glucose 97 70 - 105 mg/dL    GFR  Estimate >90 >60 mL/min/1.73m2   CBC with platelets and differential     Status: None   Result Value Ref Range    WBC Count 8.0 4.0 - 11.0 10e3/uL    RBC Count 4.33 3.80 - 5.20 10e6/uL    Hemoglobin 14.1 11.7 - 15.7 g/dL    Hematocrit 41.0 35.0 - 47.0 %    MCV 95 78 - 100 fL    MCH 32.6 26.5 - 33.0 pg    MCHC 34.4 31.5 - 36.5 g/dL    RDW 12.1 10.0 - 15.0 %    Platelet Count 237 150 - 450 10e3/uL    % Neutrophils 63 %    % Lymphocytes 27 %    % Monocytes 8 %    % Eosinophils 1 %    % Basophils 1 %    % Immature Granulocytes 0 %    NRBCs per 100 WBC 0 <1 /100    Absolute Neutrophils 5.0 1.6 - 8.3 10e3/uL    Absolute Lymphocytes 2.2 0.8 - 5.3 10e3/uL    Absolute Monocytes 0.7 0.0 - 1.3 10e3/uL    Absolute Eosinophils 0.1 0.0 - 0.7 10e3/uL    Absolute Basophils 0.0 0.0 - 0.2 10e3/uL    Absolute Immature Granulocytes 0.0 <=0.4 10e3/uL    Absolute NRBCs 0.0 10e3/uL   EKG 12-lead, tracing only (Same Day)     Status: None (Preliminary result)   Result Value Ref Range    Systolic Blood Pressure  mmHg    Diastolic Blood Pressure  mmHg    Ventricular Rate 69 BPM    Atrial Rate 69 BPM    CO Interval 138 ms    QRS Duration 86 ms     ms    QTc 443 ms    P Axis 15 degrees    R AXIS 6 degrees    T Axis -6 degrees    Interpretation ECG       Sinus rhythm  Normal ECG  No previous ECGs available     CBC and Differential     Status: None    Narrative    The following orders were created for panel order CBC and Differential.  Procedure                               Abnormality         Status                     ---------                               -----------         ------                     CBC with platelets and d...[[id number]]                      Final result                 Please view results for these tests on the individual orders.

## 2021-12-31 NOTE — NURSING NOTE
"Chief Complaint   Patient presents with     Hypertension     \"Been high for quite a few weeks, going through some stressfull things right now.\"     Patient stated the highest her B/P has gotten over the last few weeks was 175/115.  Her B/P at 7am today was 150/118. Did take her old B/P medication this morning at 7am.     Initial BP (!) 156/98 (BP Location: Left arm, Patient Position: Sitting, Cuff Size: Adult Large)   Pulse 72   Temp 98.2  F (36.8  C)   Resp 12   Wt 111.4 kg (245 lb 11.2 oz)   SpO2 98%   Breastfeeding No   BMI 39.96 kg/m   Estimated body mass index is 39.96 kg/m  as calculated from the following:    Height as of 6/15/21: 1.67 m (5' 5.75\").    Weight as of this encounter: 111.4 kg (245 lb 11.2 oz).  Medication Reconciliation: Completed     Advanced Care Directive Reviewed    Curly Byrnes LPN  "

## 2022-01-04 ENCOUNTER — IMMUNIZATION (OUTPATIENT)
Dept: FAMILY MEDICINE | Facility: OTHER | Age: 42
End: 2022-01-04
Attending: FAMILY MEDICINE
Payer: COMMERCIAL

## 2022-01-04 PROCEDURE — 91300 PR COVID VAC PFIZER DIL RECON 30 MCG/0.3 ML IM: CPT

## 2022-01-04 PROCEDURE — 0001A PR COVID VAC PFIZER DIL RECON 30 MCG/0.3 ML IM: CPT

## 2022-01-22 ENCOUNTER — HEALTH MAINTENANCE LETTER (OUTPATIENT)
Age: 42
End: 2022-01-22

## 2022-01-25 ENCOUNTER — IMMUNIZATION (OUTPATIENT)
Dept: FAMILY MEDICINE | Facility: OTHER | Age: 42
End: 2022-01-25
Attending: FAMILY MEDICINE
Payer: COMMERCIAL

## 2022-01-25 PROCEDURE — 0002A PR COVID VAC PFIZER DIL RECON 30 MCG/0.3 ML IM: CPT

## 2022-01-25 PROCEDURE — 91300 PR COVID VAC PFIZER DIL RECON 30 MCG/0.3 ML IM: CPT

## 2022-02-28 DIAGNOSIS — I10 HYPERTENSION, UNSPECIFIED TYPE: ICD-10-CM

## 2022-03-01 RX ORDER — LISINOPRIL AND HYDROCHLOROTHIAZIDE 12.5; 2 MG/1; MG/1
2 TABLET ORAL DAILY
Qty: 180 TABLET | Refills: 4 | Status: SHIPPED | OUTPATIENT
Start: 2022-03-01 | End: 2023-05-03

## 2022-03-01 NOTE — TELEPHONE ENCOUNTER
[facility] sent Rx request for the following:   lisinopril-hydrochlorothiazide (ZESTORETIC) 20-12.5 MG tablet  Sig TAKE 2 TABLETS BY MOUTH DAILY    Last Prescription Date:   12/31/2021  End date 03/01/2022  Last Fill Qty/Refills:         120, R-0    Last Office Visit:              12/09/2021 (Virtual- [place])   Future Office visit:           03/03/2022 ([place])   Diuretics (Including Combos) Protocol Failed - 2/28/2022  3:57 AM        Failed - Blood pressure under 140/90 in past 12 months     BP Readings from Last 3 Encounters:   12/31/21 (!) 156/98   06/15/21 122/82   05/19/21 124/70              Patient to have upcoming appointment. Needs refill prior. Unable to complete prescription refill per RN Medication Refill Policy.................... Kiara Lamb RN ....................  3/1/2022   9:27 AM

## 2022-03-03 ENCOUNTER — OFFICE VISIT (OUTPATIENT)
Dept: FAMILY MEDICINE | Facility: OTHER | Age: 42
End: 2022-03-03
Attending: FAMILY MEDICINE
Payer: COMMERCIAL

## 2022-03-03 VITALS
BODY MASS INDEX: 40.53 KG/M2 | TEMPERATURE: 98.2 F | RESPIRATION RATE: 16 BRPM | OXYGEN SATURATION: 100 % | WEIGHT: 249.2 LBS | DIASTOLIC BLOOD PRESSURE: 80 MMHG | HEART RATE: 102 BPM | SYSTOLIC BLOOD PRESSURE: 136 MMHG

## 2022-03-03 DIAGNOSIS — I10 HYPERTENSION, UNSPECIFIED TYPE: Primary | ICD-10-CM

## 2022-03-03 DIAGNOSIS — M22.41 CHONDROMALACIA OF PATELLA, RIGHT: ICD-10-CM

## 2022-03-03 DIAGNOSIS — M22.42 CHONDROMALACIA OF PATELLA, LEFT: ICD-10-CM

## 2022-03-03 DIAGNOSIS — R79.89 ABNORMAL TSH: ICD-10-CM

## 2022-03-03 DIAGNOSIS — F33.0 MAJOR DEPRESSIVE DISORDER, RECURRENT, MILD (H): ICD-10-CM

## 2022-03-03 DIAGNOSIS — I10 ESSENTIAL HYPERTENSION: ICD-10-CM

## 2022-03-03 DIAGNOSIS — L71.9 ROSACEA: ICD-10-CM

## 2022-03-03 LAB — TSH SERPL DL<=0.005 MIU/L-ACNC: 1.68 MU/L (ref 0.4–4)

## 2022-03-03 PROCEDURE — 99207 ZZ-DO NOT USE PR NO CHARGE LOS: CPT | Performed by: FAMILY MEDICINE

## 2022-03-03 PROCEDURE — 36415 COLL VENOUS BLD VENIPUNCTURE: CPT | Mod: ZL | Performed by: FAMILY MEDICINE

## 2022-03-03 PROCEDURE — 84443 ASSAY THYROID STIM HORMONE: CPT | Mod: ZL | Performed by: FAMILY MEDICINE

## 2022-03-03 PROCEDURE — 99214 OFFICE O/P EST MOD 30 MIN: CPT | Mod: 25 | Performed by: FAMILY MEDICINE

## 2022-03-03 PROCEDURE — 20610 DRAIN/INJ JOINT/BURSA W/O US: CPT | Performed by: FAMILY MEDICINE

## 2022-03-03 PROCEDURE — 250N000011 HC RX IP 250 OP 636: Performed by: FAMILY MEDICINE

## 2022-03-03 RX ORDER — METHYLPREDNISOLONE ACETATE 80 MG/ML
80 INJECTION, SUSPENSION INTRA-ARTICULAR; INTRALESIONAL; INTRAMUSCULAR; SOFT TISSUE ONCE
Status: COMPLETED | OUTPATIENT
Start: 2022-03-03 | End: 2022-03-03

## 2022-03-03 RX ORDER — VENLAFAXINE HYDROCHLORIDE 150 MG/1
150 CAPSULE, EXTENDED RELEASE ORAL DAILY
Qty: 90 CAPSULE | Refills: 4 | Status: SHIPPED | OUTPATIENT
Start: 2022-03-03 | End: 2023-05-15

## 2022-03-03 RX ADMIN — METHYLPREDNISOLONE ACETATE 80 MG: 80 INJECTION, SUSPENSION INTRA-ARTICULAR; INTRALESIONAL; INTRAMUSCULAR; SOFT TISSUE at 16:36

## 2022-03-03 RX ADMIN — METHYLPREDNISOLONE ACETATE 80 MG: 80 INJECTION, SUSPENSION INTRA-ARTICULAR; INTRALESIONAL; INTRAMUSCULAR; SOFT TISSUE at 16:34

## 2022-03-03 ASSESSMENT — ANXIETY QUESTIONNAIRES
2. NOT BEING ABLE TO STOP OR CONTROL WORRYING: SEVERAL DAYS
7. FEELING AFRAID AS IF SOMETHING AWFUL MIGHT HAPPEN: SEVERAL DAYS
6. BECOMING EASILY ANNOYED OR IRRITABLE: SEVERAL DAYS
IF YOU CHECKED OFF ANY PROBLEMS ON THIS QUESTIONNAIRE, HOW DIFFICULT HAVE THESE PROBLEMS MADE IT FOR YOU TO DO YOUR WORK, TAKE CARE OF THINGS AT HOME, OR GET ALONG WITH OTHER PEOPLE: SOMEWHAT DIFFICULT
GAD7 TOTAL SCORE: 5
5. BEING SO RESTLESS THAT IT IS HARD TO SIT STILL: NOT AT ALL
3. WORRYING TOO MUCH ABOUT DIFFERENT THINGS: SEVERAL DAYS
1. FEELING NERVOUS, ANXIOUS, OR ON EDGE: SEVERAL DAYS

## 2022-03-03 ASSESSMENT — PAIN SCALES - GENERAL: PAINLEVEL: MODERATE PAIN (5)

## 2022-03-03 ASSESSMENT — PATIENT HEALTH QUESTIONNAIRE - PHQ9
5. POOR APPETITE OR OVEREATING: NOT AT ALL
SUM OF ALL RESPONSES TO PHQ QUESTIONS 1-9: 5

## 2022-03-03 NOTE — PROGRESS NOTES
Assessment & Plan     (I10) Hypertension, unspecified type  (primary encounter diagnosis)  Comment: is at goal now  Plan: follow up yearly     (F33.0) Major depressive disorder, recurrent, mild (H)  Comment: perhaps worse..  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    Alin Pimentel MD  [facility] AND [facility]    Karl Galloway is a 41 year old who presents for the following health issues     History of Present Illness       Hypertension: She presents for follow up of hypertension.  She does not check blood pressure  regularly outside of the clinic. Outside blood pressures have been over 140/90. She does not follow a low salt diet.     She eats 2-3 servings of fruits and vegetables daily.She exercises with enough effort to increase her heart rate 10 to 19 minutes per day.  She exercises with enough effort to increase her heart rate 3 or less days per week. She is missing 1 dose(s) of medications per week.       Medication Followup of htn    Taking Medication as prescribed: yes    Side Effects:  None    Medication Helping Symptoms:  yes     Was seen in , and placed back on htn meds. No longer feels a racing heart beat.  No side effects from them.     Also wants to have both knees injected again.  Last was 5/21.  Gets several month's of relief from them.  Has gained weight, with more knee pain.  Pain is deep to the patella, worse with going up stairs.   "    Is also due for a repeat TSH, last was low, under 0.2, 5/2021.      More anxiety lately. Had tried to stop the pristiq, but did worse.  Has had a DNA test and this was one of the better options for her. Finds it is expensive.  Uses ativan at night, most nights.     Current Outpatient Medications   Medication     Cholecalciferol (VITAMIN D3) 2000 units CAPS     cyanocobalamin (CYANOCOBALAMIN) 1000 MCG/ML injection     desvenlafaxine (PRISTIQ) 50 MG 24 hr tablet     gabapentin (NEURONTIN) 100 MG capsule     levothyroxine (SYNTHROID/LEVOTHROID) 137 MCG tablet     lisinopril-hydrochlorothiazide (ZESTORETIC) 20-12.5 MG tablet     LORazepam (ATIVAN) 1 MG tablet     Multiple Vitamin (MULTI-VITAMINS) TABS     syringe/needle, disp, (MEDSAVER SYRINGE) 25G X 1\" 3 ML MISC     traMADol (ULTRAM) 50 MG tablet     vitamin B complex with vitamin C (VITAMIN  B COMPLEX) tablet     metroNIDAZOLE (METROGEL) 0.75 % external gel     traZODone (DESYREL) 100 MG tablet     No current facility-administered medications for this visit.           Review of Systems         Objective    /80   Pulse 102   Temp 98.2  F (36.8  C)   Resp 16   Wt 113 kg (249 lb 3.2 oz)   LMP 02/23/2022 (Approximate)   SpO2 100%   Breastfeeding No   BMI 40.53 kg/m    Body mass index is 40.53 kg/m .  Physical Exam  Constitutional:       Appearance: Normal appearance.   Cardiovascular:      Rate and Rhythm: Normal rate and regular rhythm.      Heart sounds: No murmur heard.  Pulmonary:      Effort: Pulmonary effort is normal. No respiratory distress.      Breath sounds: No stridor.   Neurological:      Mental Status: She is alert.   Psychiatric:         Mood and Affect: Mood normal.         Behavior: Behavior normal.         Thought Content: Thought content normal.      discussed with her risks of injections. She consented.  Left knee prepped and infiltrated with 4 ml 1% lidocaine and 80 milligram depo medrol.  Same was then done on the right.  "         Results for orders placed or performed in visit on 03/03/22   TSH     Status: Normal   Result Value Ref Range    TSH 1.68 0.40 - 4.00 mU/L

## 2022-03-03 NOTE — NURSING NOTE
"Chief Complaint   Patient presents with     Hypertension     bilateral shots in the knees       Initial /80   Pulse 102   Temp 98.2  F (36.8  C)   Resp 16   Wt 113 kg (249 lb 3.2 oz)   LMP 02/23/2022 (Approximate)   SpO2 100%   Breastfeeding No   BMI 40.53 kg/m   Estimated body mass index is 40.53 kg/m  as calculated from the following:    Height as of 6/15/21: 1.67 m (5' 5.75\").    Weight as of this encounter: 113 kg (249 lb 3.2 oz).  Medication Reconciliation: complete.  FOOD SECURITY SCREENING QUESTIONS  Hunger Vital Signs:  Within the past 12 months we worried whether our food would run out before we got money to buy more. Never  Within the past 12 months the food we bought just didn't last and we didn't have money to get more. Never  Yadira Warner LPN 3/3/2022 3:49 PM      Yadira Warner LPN  "

## 2022-03-04 ASSESSMENT — ANXIETY QUESTIONNAIRES: GAD7 TOTAL SCORE: 5

## 2022-04-04 ENCOUNTER — MYC MEDICAL ADVICE (OUTPATIENT)
Dept: FAMILY MEDICINE | Facility: OTHER | Age: 42
End: 2022-04-04
Payer: COMMERCIAL

## 2022-04-04 DIAGNOSIS — N95.1 MENOPAUSAL SYNDROME (HOT FLASHES): Primary | ICD-10-CM

## 2022-04-05 DIAGNOSIS — G56.92 NEUROPATHY, ARM, LEFT: ICD-10-CM

## 2022-04-07 RX ORDER — GABAPENTIN 100 MG/1
CAPSULE ORAL
Qty: 270 CAPSULE | Refills: 3 | Status: SHIPPED | OUTPATIENT
Start: 2022-04-07 | End: 2022-04-20

## 2022-04-07 RX ORDER — CHOLECALCIFEROL (VITAMIN D3) 25 MCG
TABLET,CHEWABLE ORAL
Qty: 90 TABLET | Refills: 2 | Status: SHIPPED | OUTPATIENT
Start: 2022-04-07

## 2022-04-07 NOTE — TELEPHONE ENCOUNTER
[id number] sent Rx request for the following:      Requested Prescriptions   Pending Prescriptions Disp Refills     B Complex-C-E-Zn (STRESS B-COMPLEX/VIT C/ZINC) TABS [Pharmacy Med Name: STRESS B COMPLEX VIT C/ZINC TABS] 90 tablet      Sig: TAKE 1 TABLET BY MOUTH EVERY MORNING       There is no refill protocol information for this order     Last Prescription Date:   Historical  Last Fill Qty/Refills:         0, R-0       gabapentin (NEURONTIN) 100 MG capsule [Pharmacy Med Name: GABAPENTIN 100MG CAPSULES] 270 capsule 3     Sig: TAKE 1 CAPSULE(100 MG) BY MOUTH THREE TIMES DAILY       There is no refill protocol information for this order        Last Prescription Date:   5/19/21  Last Fill Qty/Refills:         270, R-3  Last Office Visit:              03/03/22  Future Office visit:           None      Beryl Hills RN .............. 4/7/2022  8:26 AM

## 2022-04-13 ENCOUNTER — LAB (OUTPATIENT)
Dept: LAB | Facility: OTHER | Age: 42
End: 2022-04-13
Attending: FAMILY MEDICINE
Payer: COMMERCIAL

## 2022-04-13 ENCOUNTER — MYC MEDICAL ADVICE (OUTPATIENT)
Dept: FAMILY MEDICINE | Facility: OTHER | Age: 42
End: 2022-04-13
Payer: COMMERCIAL

## 2022-04-13 DIAGNOSIS — N95.1 MENOPAUSAL SYNDROME (HOT FLASHES): ICD-10-CM

## 2022-04-13 LAB
FSH SERPL-ACNC: 2.5 PG/ML
LH SERPL-ACNC: 2 IU/L

## 2022-04-13 PROCEDURE — 83002 ASSAY OF GONADOTROPIN (LH): CPT | Mod: ZL

## 2022-04-13 PROCEDURE — 36415 COLL VENOUS BLD VENIPUNCTURE: CPT | Mod: ZL

## 2022-04-13 PROCEDURE — 83001 ASSAY OF GONADOTROPIN (FSH): CPT | Mod: ZL

## 2022-04-19 ENCOUNTER — MYC MEDICAL ADVICE (OUTPATIENT)
Dept: FAMILY MEDICINE | Facility: OTHER | Age: 42
End: 2022-04-19
Payer: COMMERCIAL

## 2022-04-19 DIAGNOSIS — G56.92 NEUROPATHY, ARM, LEFT: Primary | ICD-10-CM

## 2022-04-20 RX ORDER — GABAPENTIN 300 MG/1
300 CAPSULE ORAL 3 TIMES DAILY
Qty: 270 CAPSULE | Refills: 4 | Status: SHIPPED | OUTPATIENT
Start: 2022-04-20

## 2022-04-25 ENCOUNTER — LAB (OUTPATIENT)
Dept: LAB | Facility: OTHER | Age: 42
End: 2022-04-25

## 2022-04-25 ENCOUNTER — LAB REQUISITION (OUTPATIENT)
Dept: LAB | Facility: OTHER | Age: 42
End: 2022-04-25
Payer: COMMERCIAL

## 2022-04-25 DIAGNOSIS — Z02.1 ENCOUNTER FOR PRE-EMPLOYMENT EXAMINATION: ICD-10-CM

## 2022-04-25 PROCEDURE — 86481 TB AG RESPONSE T-CELL SUSP: CPT | Mod: ZL | Performed by: FAMILY MEDICINE

## 2022-04-25 PROCEDURE — 36415 COLL VENOUS BLD VENIPUNCTURE: CPT | Performed by: FAMILY MEDICINE

## 2022-04-27 LAB
GAMMA INTERFERON BACKGROUND BLD IA-ACNC: 0.02 IU/ML
M TB IFN-G BLD-IMP: ABNORMAL
M TB IFN-G CD4+ BCKGRND COR BLD-ACNC: 0.08 IU/ML
MITOGEN IGNF BCKGRD COR BLD-ACNC: -0.01 IU/ML
MITOGEN IGNF BCKGRD COR BLD-ACNC: 0.01 IU/ML
QUANTIFERON MITOGEN: 0.1 IU/ML
QUANTIFERON NIL TUBE: 0.02 IU/ML
QUANTIFERON TB1 TUBE: 0.01 IU/ML
QUANTIFERON TB2 TUBE: 0.03

## 2022-05-14 ENCOUNTER — HEALTH MAINTENANCE LETTER (OUTPATIENT)
Age: 42
End: 2022-05-14

## 2022-06-08 ENCOUNTER — MYC MEDICAL ADVICE (OUTPATIENT)
Dept: FAMILY MEDICINE | Facility: OTHER | Age: 42
End: 2022-06-08
Payer: COMMERCIAL

## 2022-06-08 DIAGNOSIS — M22.2X2 PATELLOFEMORAL PAIN SYNDROME OF LEFT KNEE: ICD-10-CM

## 2022-06-08 DIAGNOSIS — M25.561 PATELLOFEMORAL ARTHRALGIA OF RIGHT KNEE: ICD-10-CM

## 2022-06-08 DIAGNOSIS — F33.0 MAJOR DEPRESSIVE DISORDER, RECURRENT, MILD (H): ICD-10-CM

## 2022-06-09 RX ORDER — LORAZEPAM 1 MG/1
TABLET ORAL
Qty: 30 TABLET | Refills: 3 | OUTPATIENT
Start: 2022-06-09

## 2022-06-09 RX ORDER — TRAMADOL HYDROCHLORIDE 50 MG/1
TABLET ORAL
Qty: 120 TABLET | Refills: 3 | OUTPATIENT
Start: 2022-06-09

## 2022-06-13 RX ORDER — TRAMADOL HYDROCHLORIDE 50 MG/1
50 TABLET ORAL EVERY 6 HOURS PRN
Qty: 120 TABLET | Refills: 0 | Status: SHIPPED | OUTPATIENT
Start: 2022-06-13 | End: 2022-06-14

## 2022-06-13 RX ORDER — LORAZEPAM 1 MG/1
1 TABLET ORAL 2 TIMES DAILY PRN
Qty: 30 TABLET | Refills: 0 | Status: SHIPPED | OUTPATIENT
Start: 2022-06-13 | End: 2022-06-14

## 2022-06-14 RX ORDER — LORAZEPAM 1 MG/1
1 TABLET ORAL 2 TIMES DAILY PRN
Qty: 30 TABLET | Refills: 0 | Status: SHIPPED | OUTPATIENT
Start: 2022-06-14

## 2022-06-14 RX ORDER — TRAMADOL HYDROCHLORIDE 50 MG/1
50 TABLET ORAL EVERY 6 HOURS PRN
Qty: 120 TABLET | Refills: 0 | Status: SHIPPED | OUTPATIENT
Start: 2022-06-14

## 2022-06-15 NOTE — TELEPHONE ENCOUNTER
Controlled substances, needs appt for refills.   
Dr Pimentel sent them to [facility] yesterday per patient request. Verified cancellation at [facility] and confirmed receipt at [facility]. They will notify the patient.  Anny Perez CMA([facility])..................6/15/2022   2:01 PM   
Routing to covering provider as directed. Jackelyn Garsia RN on 6/9/2022 at 8:18 AM    traMADol (ULTRAM)  Last Prescription Date: 12/9/21  Last Qty/Refills: 120 / 5    LORazepam (ATIVAN)   Last Prescription Date: 12/9/21  Last Qty/Refills: 30 / 5    Last Office Visit: 3/3/22 [facility]  Future Office Visit: none     Requested Prescriptions   Pending Prescriptions Disp Refills     traMADol (ULTRAM) 50 MG tablet [Pharmacy Med Name: TRAMADOL 50MG TABLETS] 120 tablet      Sig: TAKE 1 TABLET(50 MG) BY MOUTH EVERY 6 HOURS AS NEEDED FOR SEVERE PAIN       There is no refill protocol information for this order        LORazepam (ATIVAN) 1 MG tablet [Pharmacy Med Name: LORAZEPAM 1MG TABLETS] 30 tablet      Sig: TAKE 1 TABLET(1 MG) BY MOUTH TWICE DAILY AS NEEDED FOR ANXIETY       There is no refill protocol information for this order          
Unable to Assess

## 2022-08-22 ENCOUNTER — TELEPHONE (OUTPATIENT)
Dept: FAMILY MEDICINE | Facility: OTHER | Age: 42
End: 2022-08-22

## 2022-08-22 NOTE — TELEPHONE ENCOUNTER
[facility] says patient picked up at [facility] was cheaper there-but requesting Tramadol at [facility] now-would like a call.

## 2022-08-23 NOTE — TELEPHONE ENCOUNTER
Patient picked up prescription at [facility] and was trying to fill another prescription at [facility]. [facility] canceled the order they had.    Diandra San LPN on 8/23/2022 at 2:02 PM

## 2022-09-04 ENCOUNTER — HEALTH MAINTENANCE LETTER (OUTPATIENT)
Age: 42
End: 2022-09-04

## 2022-09-13 ENCOUNTER — LAB REQUISITION (OUTPATIENT)
Dept: LAB | Facility: OTHER | Age: 42
End: 2022-09-13

## 2022-09-13 ENCOUNTER — APPOINTMENT (OUTPATIENT)
Dept: LAB | Facility: OTHER | Age: 42
End: 2022-09-13
Attending: FAMILY MEDICINE

## 2022-09-13 DIAGNOSIS — E03.9 HYPOTHYROIDISM, UNSPECIFIED: ICD-10-CM

## 2022-09-13 LAB — TSH SERPL DL<=0.005 MIU/L-ACNC: 1.58 MU/L (ref 0.4–4)

## 2022-09-13 PROCEDURE — 36415 COLL VENOUS BLD VENIPUNCTURE: CPT | Performed by: FAMILY MEDICINE

## 2022-09-13 PROCEDURE — 84443 ASSAY THYROID STIM HORMONE: CPT | Performed by: FAMILY MEDICINE

## 2022-09-14 DIAGNOSIS — R79.89 ABNORMAL TSH: ICD-10-CM

## 2022-09-14 RX ORDER — LEVOTHYROXINE SODIUM 137 UG/1
137 TABLET ORAL DAILY
Qty: 90 TABLET | Refills: 3 | Status: SHIPPED | OUTPATIENT
Start: 2022-09-14 | End: 2023-11-21

## 2022-10-24 NOTE — NURSING NOTE
Individual Follow-Up Form    10/24/2022    Quit Date: 6/2/2022    Clinical Status of Patient: Outpatient    Length of Service: 30 minutes    Continuing Medication: no     Target Symptoms: Withdrawal and medication side effects. The following were  rated moderate (3) to severe (4) on TCRS:  Moderate (3): None Reported  Severe (4): None Reported    Comments: Spoke with patient at length in clinic regarding tobacco cessation progress. Patient is currently not using prescribed tobacco cessation medication at this time.  Patient quit smoking on 6/2/22.  Patient's goal is to continue to remain tobacco free and use the skills learned to help address any future nicotine cravings.  Reviewed strategies, controlling environment, cues, triggers, new goals set. Introduced high risk situations with preparation interventions, caffeine similarities with withdrawal issues of habit and nicotine, Alcohol, Understanding urges, cravings, stress and relaxation. Open discussion with intervention discussion.  Patient stated that he will assist his wife to quit smoking.  Patient appeared to be receptive to the information given.  Counselor will continue to motivate patient to remain tobacco free.    Diagnosis: F17.200    Next Visit: 1 week     Patient Information     Patient Name MRN Laverne Harrison [id number] Female 1980      Nursing Note by Fabienne Hills at 10/13/2017  2:30 PM     Author:  Fabienne Hills Service:  (none) Author Type:  (none)     Filed:  10/13/2017  2:39 PM Encounter Date:  10/13/2017 Status:  Signed     :  Fabienne Hills            Patient here for medicaton refill. Having right side back pain and numbness down the right leg. She would like her thyroid checked no energy, weight is up. Fabienne Hills LPN .......................10/13/2017  2:35 PM'

## 2022-11-04 DIAGNOSIS — R79.89 ABNORMAL TSH: ICD-10-CM

## 2022-11-04 RX ORDER — LEVOTHYROXINE SODIUM 137 UG/1
TABLET ORAL
Qty: 90 TABLET | Refills: 0 | OUTPATIENT
Start: 2022-11-04

## 2022-11-04 NOTE — TELEPHONE ENCOUNTER
[facility] sent Rx request for the following:      Levothyroxine Sodium 137 MCG Oral Tablet      Last Prescription Date:   9/14/2022  Last Fill Qty/Refills:         90, R-3    Last Office Visit:              3/3/2022   Future Office visit:           none    Redundant refill request refused: Too soon:  Sukumar Hooper RN, BSN  ....................  11/4/2022   2:51 PM

## 2022-11-07 DIAGNOSIS — R79.89 ABNORMAL TSH: ICD-10-CM

## 2022-11-09 DIAGNOSIS — E53.8 B12 DEFICIENCY: ICD-10-CM

## 2022-11-09 RX ORDER — LEVOTHYROXINE SODIUM 137 UG/1
TABLET ORAL
Qty: 90 TABLET | Refills: 0 | OUTPATIENT
Start: 2022-11-09

## 2022-11-09 NOTE — TELEPHONE ENCOUNTER
Filled 09/14/2022 #90 x 3 to [facility]. [facility] notified. Unable to complete prescription refill per RNMedication Refill Policy.................... Prema Aiken RN ....................  11/9/2022   11:10 AM             Disp Refills Start End MELISSA   levothyroxine (SYNTHROID/LEVOTHROID) 137 MCG tablet 90 tablet 3 9/14/2022  No   Sig - Route: Take 1 tablet (137 mcg) by mouth daily - Oral   Sent to pharmacy as: Levothyroxine Sodium 137 MCG Oral Tablet (SYNTHROID/LEVOTHROID)   Class: E-Prescribe   Order: [id number]   E-Prescribing Status: Receipt confirmed by pharmacy (9/14/2022  8:29 AM CDT)       [id number] - [place] - [address]

## 2022-11-09 NOTE — TELEPHONE ENCOUNTER
"[facility] called requesting refill of the following, to go with B12 injections:    cyanocobalamin (CYANOCOBALAMIN) 1000 MCG/ML injection 3 mL 3 2021  No   Sig: ADMINISTER 1 ML(1000 MCG) IN THE MUSCLE EVERY 30 DAYS      Requested Prescriptions   Pending Prescriptions Disp Refills     syringe/needle (disp) (MEDSAVER SYRINGE) 25G X 1\" 3 ML MISC 12 each 0     Sig: Inject 1 each into the muscle every 30 days   Last Prescription Date:   18  Last Fill Qty/Refills:         12, R-0    Last Office Visit:              3/3/22   Future Office visit:           None    Per LOV note: Return in about 3 months (around 6/3/2022).    Pt due for 3-month follow up. Routing to provider for refill consideration. Routing to  OUTREACH APPT REQUESTS pool, to assist Pt in scheduling appointment.     Valorie Castorena RN .............. 2022  4:49 PM  "

## 2022-11-10 RX ORDER — NEEDLES, SAFETY 18GX1 1/2"
1 NEEDLE, DISPOSABLE MISCELLANEOUS
Qty: 12 EACH | Refills: 1 | Status: SHIPPED | OUTPATIENT
Start: 2022-11-10 | End: 2024-04-16

## 2023-05-03 ENCOUNTER — HOSPITAL ENCOUNTER (OUTPATIENT)
Dept: GENERAL RADIOLOGY | Facility: OTHER | Age: 43
Discharge: HOME | End: 2023-05-03
Attending: NURSE PRACTITIONER
Payer: OTHER MISCELLANEOUS

## 2023-05-03 ENCOUNTER — OFFICE VISIT (OUTPATIENT)
Dept: FAMILY MEDICINE | Facility: OTHER | Age: 43
End: 2023-05-03
Payer: OTHER MISCELLANEOUS

## 2023-05-03 VITALS
HEART RATE: 84 BPM | WEIGHT: 267 LBS | TEMPERATURE: 98.3 F | RESPIRATION RATE: 16 BRPM | DIASTOLIC BLOOD PRESSURE: 90 MMHG | BODY MASS INDEX: 44.48 KG/M2 | OXYGEN SATURATION: 98 % | SYSTOLIC BLOOD PRESSURE: 136 MMHG | HEIGHT: 65 IN

## 2023-05-03 DIAGNOSIS — I10 HYPERTENSION, UNSPECIFIED TYPE: ICD-10-CM

## 2023-05-03 DIAGNOSIS — M54.2 NECK PAIN: Primary | ICD-10-CM

## 2023-05-03 DIAGNOSIS — M54.2 NECK PAIN: ICD-10-CM

## 2023-05-03 DIAGNOSIS — M54.89 OTHER ACUTE BACK PAIN: ICD-10-CM

## 2023-05-03 PROCEDURE — 72040 X-RAY EXAM NECK SPINE 2-3 VW: CPT

## 2023-05-03 PROCEDURE — 99213 OFFICE O/P EST LOW 20 MIN: CPT | Performed by: NURSE PRACTITIONER

## 2023-05-03 RX ORDER — BUPROPION HYDROCHLORIDE 300 MG/1
1 TABLET ORAL
COMMUNITY
Start: 2023-04-19

## 2023-05-03 RX ORDER — LISINOPRIL AND HYDROCHLOROTHIAZIDE 12.5; 2 MG/1; MG/1
2 TABLET ORAL DAILY
Qty: 180 TABLET | Refills: 4 | OUTPATIENT
Start: 2023-05-03

## 2023-05-03 RX ORDER — CYCLOBENZAPRINE HCL 5 MG
5 TABLET ORAL 2 TIMES DAILY PRN
Qty: 20 TABLET | Refills: 0 | Status: SHIPPED | OUTPATIENT
Start: 2023-05-03

## 2023-05-03 RX ORDER — PREDNISONE 20 MG/1
TABLET ORAL
Qty: 20 TABLET | Refills: 0 | Status: SHIPPED | OUTPATIENT
Start: 2023-05-03

## 2023-05-03 RX ORDER — CYCLOBENZAPRINE HCL 10 MG
1 TABLET ORAL
COMMUNITY
Start: 2023-01-24

## 2023-05-03 ASSESSMENT — PAIN SCALES - GENERAL: PAINLEVEL: SEVERE PAIN (7)

## 2023-05-03 NOTE — TELEPHONE ENCOUNTER
[id number] sent Rx request for the following:      Requested Prescriptions   Pending Prescriptions Disp Refills     lisinopril-hydrochlorothiazide (ZESTORETIC) 20-12.5 MG tablet 180 tablet 4     Sig: Take 2 tablets by mouth daily       There is no refill protocol information for this order          Last Prescription Date:   5/3/23  Last Fill Qty/Refills:         180, R-4    Last Office Visit:              3/3/22   Future Office visit:           None    Duplicate: Medication filled on 5/3/23 for 180 tabs and 4 refills. Snow Moyer RN on 5/3/2023 at 3:12 PM

## 2023-05-03 NOTE — NURSING NOTE
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 no    Medication Review Completed: complete    FOOD SECURITY SCREENING QUESTIONS:    The next two questions are to help us understand your food security.  If you are feeling you need any assistance in this area, we have resources available to support you today.    Hunger Vital Signs:  Within the past 12 months we worried whether our food would run out before we got money to buy more. Never  Within the past 12 months the food we bought just didn't last and we didn't have money to get more. Never    Carlee Adkins LPN

## 2023-05-03 NOTE — PROGRESS NOTES
Laverne JF Hanson  1980    Workmen's Comp. Injury-the [facility] assisted living, [facility]  Date of injury: 5/1/2023  ASSESSMENT/PLAN:   1. Neck pain  2. Other acute back pain  Patient presents for further evaluation of neck and bilateral shoulder discomfort.  She sustained injury while working on 5/1/2023.  This is a Workmen's Comp. injury initial visit.    Logical exam intact.  She is not exhibiting any bony tenderness on exam.  She does have adequate range of motion of neck, decreased due to muscular tension.  I would recommend cervical spine x-ray to rule out any acute fracture or dislocation.  X-ray returned normal.  Reviewed with patient that symptoms are likely muscular in nature.  I would recommend rest, ice, heat, Tylenol as needed for discomfort.  She cannot tolerate NSAIDs.  I think she would benefit from a muscle relaxer in the evening.  Due to her progressing pain I we will also trial a prednisone taper to help with inflammation and pain.  Patient is agreeable.  We discussed most common adverse side effects.  I recommend she follow-up with occupational medicine in 1 week for further evaluation.  She does not return to work for 5 days, I recommend she may return to work next Monday with lifting restrictions of not lifting more than 30 pounds.  Should symptoms worsen or persist, she knows to return for reevaluation.  - XR Cervical Spine 2/3 Views; Future  - cyclobenzaprine (FLEXERIL) 5 MG tablet; Take 1 tablet (5 mg) by mouth 2 times daily as needed for muscle spasms  Dispense: 20 tablet; Refill: 0  - predniSONE (DELTASONE) 20 MG tablet; Take 3 tabs by mouth daily x 3 days, then 2 tabs daily x 3 days, then 1 tab daily x 3 days, then 1/2 tab daily x 3 days.  Dispense: 20 tablet; Refill: 0    Patient agrees with plan of care and verbalizes understating. AVS printed. Patient education provided verbally and written instructions provided as requested. Patient made aware of emergent sings and  "symptoms to monitor for and when to seek additional care/follow up.     SUBJECTIVE:   CHIEF COMPLAINT/ REASON FOR VISIT  Patient presents with:  Work Comp: Neck and shoulder injury     HISTORY OF PRESENT ILLNESS  Laverne Hanson is a pleasant 43 year old female presents to [facility] clinic today concerns for worsening neck and shoulder pain after injury at work on Monday night.    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  She felt pain equally on left and right side.  She is having difficulty with range of motion of her neck.  Today the right side of her neck and shoulder pain is worse than the left.  She denies any weakness or paresthesias into her upper or lower extremities.  She is having a headache due to the pain.  She cannot take NSAIDs, but has been trying ice and Tylenol.  Patient tried going to work yesterday however called into work today due to the worsening and persistent pain.    I have reviewed the nursing notes.  I have reviewed allergies, medication list, problem list, and past medical history.    REVIEW OF SYSTEMS  Review of Systems   Constitutional: Positive for activity change. Negative for chills, fatigue and fever.   HENT: Negative.    Respiratory: Negative.    Cardiovascular: Negative.  Negative for chest pain.   Musculoskeletal: Positive for arthralgias, back pain and neck pain.        Neck and shoulder pain   Skin: Negative for rash.   Neurological: Positive for headaches. Negative for dizziness and weakness.      VITAL SIGNS  Vitals:    05/03/23 1236   BP: (!) 136/90   BP Location: Left arm   Patient Position: Sitting   Cuff Size: Adult Large   Pulse: 84   Resp: 16   Temp: 98.3  F (36.8  C)   TempSrc: Tympanic   SpO2: 98%   Weight: 121.1 kg (267 lb)   Height: 1.651 m (5' 5\")      Body mass index is 44.43 kg/m .    OBJECTIVE: "   PHYSICAL EXAM  Physical Exam  Vitals reviewed.   Constitutional:       Appearance: Normal appearance. She is not ill-appearing or toxic-appearing.   HENT:      Head: Normocephalic and atraumatic.      Nose: Nose normal.   Eyes:      Conjunctiva/sclera: Conjunctivae normal.   Cardiovascular:      Rate and Rhythm: Normal rate and regular rhythm.      Pulses: Normal pulses.      Heart sounds: Normal heart sounds.   Pulmonary:      Effort: Pulmonary effort is normal.      Breath sounds: Normal breath sounds. No wheezing.   Musculoskeletal:         General: Tenderness and signs of injury present.      Right shoulder: Tenderness present. No crepitus. Normal strength. Normal pulse.      Left shoulder: Tenderness present. No crepitus. Normal strength. Normal pulse.      Cervical back: Tenderness present. No edema, erythema, bony tenderness or crepitus. Pain with movement present. Decreased range of motion.      Comments: Normal range of motion of neck, some discomfort due to stretching.  No bony prominence tenderness along cervical spine.  Muscular pain with palpation over bilateral trapezius muscles.  Hand  strength equal bilaterally.  No paresthesias or weakness in upper or lower extremities.  Adequate range of motion with shoulder test, negative Jackson, negative Neer's, negative empty can, negative scratch test.   Skin:     Capillary Refill: Capillary refill takes less than 2 seconds.      Findings: No rash.   Neurological:      General: No focal deficit present.      Mental Status: She is alert and oriented to person, place, and time.   Psychiatric:         Mood and Affect: Mood normal.         Behavior: Behavior normal.         Thought Content: Thought content normal.         Judgment: Judgment normal.        DIAGNOSTICS  Results for orders placed or performed during the hospital encounter of 05/03/23   XR Cervical Spine 2/3 Views     Status: None    Narrative    PROCEDURE: XR CERVICAL SPINE 2/3  VIEWS    HISTORY: Neck pain.    COMPARISON: None.    TECHNIQUE: The views of the cervical spine were obtained.    FINDINGS: No acute or healing fracture is seen. The cervical lordosis  is straightened. The odontoid is intact. The disk spaces are  maintained.         Impression    IMPRESSION: Essentially negative radiographs of the cervical spine.     NICKY SAEED MD         SYSTEM ID:  [id number]        Diandra Gilman NP  [facility]

## 2023-05-04 ASSESSMENT — ENCOUNTER SYMPTOMS
CARDIOVASCULAR NEGATIVE: 1
NECK PAIN: 1
HEADACHES: 1
DIZZINESS: 0
FEVER: 0
CHILLS: 0
ARTHRALGIAS: 1
FATIGUE: 0
WEAKNESS: 0
ACTIVITY CHANGE: 1
RESPIRATORY NEGATIVE: 1
BACK PAIN: 1

## 2023-05-09 ENCOUNTER — OFFICE VISIT (OUTPATIENT)
Dept: FAMILY MEDICINE | Facility: OTHER | Age: 43
End: 2023-05-09
Attending: CHIROPRACTOR
Payer: OTHER MISCELLANEOUS

## 2023-05-09 VITALS
TEMPERATURE: 97.8 F | HEART RATE: 97 BPM | DIASTOLIC BLOOD PRESSURE: 70 MMHG | RESPIRATION RATE: 18 BRPM | BODY MASS INDEX: 43.99 KG/M2 | WEIGHT: 264 LBS | SYSTOLIC BLOOD PRESSURE: 98 MMHG | OXYGEN SATURATION: 98 % | HEIGHT: 65 IN

## 2023-05-09 DIAGNOSIS — M99.02 SEGMENTAL DYSFUNCTION OF THORACIC REGION: ICD-10-CM

## 2023-05-09 DIAGNOSIS — M99.01 SEGMENTAL DYSFUNCTION OF CERVICAL REGION: ICD-10-CM
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
## 2023-05-09 PROCEDURE — 99213 OFFICE O/P EST LOW 20 MIN: CPT | Performed by: CHIROPRACTOR

## 2023-05-09 ASSESSMENT — PAIN SCALES - GENERAL: PAINLEVEL: MODERATE PAIN (4)

## 2023-05-09 ASSESSMENT — PATIENT HEALTH QUESTIONNAIRE - PHQ9
SUM OF ALL RESPONSES TO PHQ QUESTIONS 1-9: 6
SUM OF ALL RESPONSES TO PHQ QUESTIONS 1-9: 6
10. IF YOU CHECKED OFF ANY PROBLEMS, HOW DIFFICULT HAVE THESE PROBLEMS MADE IT FOR YOU TO DO YOUR WORK, TAKE CARE OF THINGS AT HOME, OR GET ALONG WITH OTHER PEOPLE: SOMEWHAT DIFFICULT
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
## 2023-05-11 ENCOUNTER — MYC MEDICAL ADVICE (OUTPATIENT)
Dept: FAMILY MEDICINE | Facility: OTHER | Age: 43
End: 2023-05-11
Payer: COMMERCIAL

## 2023-05-13 ENCOUNTER — MYC MEDICAL ADVICE (OUTPATIENT)
Dept: FAMILY MEDICINE | Facility: OTHER | Age: 43
End: 2023-05-13
Payer: COMMERCIAL

## 2023-05-15 ENCOUNTER — OFFICE VISIT (OUTPATIENT)
Dept: FAMILY MEDICINE | Facility: OTHER | Age: 43
End: 2023-05-15
Attending: NURSE PRACTITIONER
Payer: OTHER MISCELLANEOUS

## 2023-05-15 VITALS
HEIGHT: 65 IN | OXYGEN SATURATION: 100 % | WEIGHT: 262.6 LBS | BODY MASS INDEX: 43.75 KG/M2 | RESPIRATION RATE: 12 BRPM | DIASTOLIC BLOOD PRESSURE: 90 MMHG | TEMPERATURE: 98.3 F | SYSTOLIC BLOOD PRESSURE: 132 MMHG | HEART RATE: 96 BPM

## 2023-05-15 DIAGNOSIS — M99.02 SEGMENTAL DYSFUNCTION OF THORACIC REGION: ICD-10-CM

## 2023-05-15 DIAGNOSIS — M99.01 SEGMENTAL DYSFUNCTION OF CERVICAL REGION: ICD-10-CM

## 2023-05-15 DIAGNOSIS — Z02.6 ENCOUNTER RELATED TO WORKER'S COMPENSATION CLAIM: Primary | ICD-10-CM

## 2023-05-15 PROCEDURE — 99213 OFFICE O/P EST LOW 20 MIN: CPT

## 2023-05-15 ASSESSMENT — PAIN SCALES - GENERAL: PAINLEVEL: MODERATE PAIN (5)

## 2023-05-15 ASSESSMENT — PATIENT HEALTH QUESTIONNAIRE - PHQ9
10. IF YOU CHECKED OFF ANY PROBLEMS, HOW DIFFICULT HAVE THESE PROBLEMS MADE IT FOR YOU TO DO YOUR WORK, TAKE CARE OF THINGS AT HOME, OR GET ALONG WITH OTHER PEOPLE: SOMEWHAT DIFFICULT
SUM OF ALL RESPONSES TO PHQ QUESTIONS 1-9: 7
SUM OF ALL RESPONSES TO PHQ QUESTIONS 1-9: 7

## 2023-05-15 NOTE — LETTER
[facility]  [address]  [place] [address]  Phone: [phone]  Fax: [phone]    May 15, 2023        Laverne Hanson  [address]  [place] [address]          To whom it may concern:    RE: Laverne Hanson    Patient was seen and treated today at our clinic and missed work. Please excuse on 5/15/23.   Patient may return to work 5/17/23 with the following:  limit lifting up to 20 pounds, and limit shift to 8 hours.   When the patient returns to work, the following restrictions apply until 5/23/23:      Please contact me for questions or concerns.      Sincerely,        SOHAIL DYE CNP

## 2023-05-15 NOTE — TELEPHONE ENCOUNTER
Patient called and requested a call back regarding lifting restrictions.    Okay to leave detailed message.    Lynnette Angeles on 5/15/2023 at 8:37 AM

## 2023-05-15 NOTE — TELEPHONE ENCOUNTER
Called patient to see if she had gotten in to be seen [facility] yet.  She was checked in but sitting in waiting area.    Called [facility] to see if we can get her scheduled to see Dr. Almazan sooner than she can get in.  Transferred through.    Adalgisa Hamilton RN on 5/15/2023 at 12:33 PM

## 2023-05-15 NOTE — NURSING NOTE
"Pt presents to  for her second WC visit within the past couple of weeks. Pt was in on 5/3/23 for this problem and pt was \"forced to work against restrictions\".  Pt stated that she had to have another visit in order to be off of work today.  Pt states she had seen [facility] and was given a restriction of nothing over 20 lbs and no working over 8 hrs. 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    Chief Complaint   Patient presents with     Work Comp     Neck, upper back, shoulders and lower back.       FOOD SECURITY SCREENING QUESTIONS  Hunger Vital Signs:  Within the past 12 months we worried whether our food would run out before we got money to buy more. Never  Within the past 12 months the food we bought just didn't last and we didn't have money to get more. Never  Valorie Dearmon 5/15/2023 1:25 PM      Initial BP (!) 132/90 (BP Location: Left arm, Patient Position: Sitting, Cuff Size: Adult Large)   Pulse 96   Temp 98.3  F (36.8  C) (Tympanic)   Resp 12   Ht 1.651 m (5' 5\")   Wt 119.1 kg (262 lb 9.6 oz)   SpO2 100%   BMI 43.70 kg/m   Estimated body mass index is 43.7 kg/m  as calculated from the following:    Height as of this encounter: 1.651 m (5' 5\").    Weight as of this encounter: 119.1 kg (262 lb 9.6 oz).  Medication Reconciliation: complete    Valorie Deajuventinoon  "

## 2023-05-15 NOTE — PROGRESS NOTES
ASSESSMENT/PLAN:    (Z02.6) Encounter related to worker's compensation claim  (primary encounter diagnosis); (M99.01) Segmental dysfunction of cervical region; (M99.02) Segmental dysfunction of thoracic region  Comment: Patient sustained an injury at work on 5/1/2023 while lifting.  She presented to the rapid clinic for her initial evaluation on 5/3/2023 and followed up with Dr. Canales at the Director of occupational medicine here.  She sustained segmental joint dysfunction injury to cervical and thoracic spine due to lifting.  She was placed on work restrictions at that time.  She has been required to go to work and lift more than restrictions placed per Dr. Saba and also worked beyond her given shift duration.  She feels she is unable to work her shift today and work required her to present to clinic for reevaluation.  Assessment consistent with previous assessment.  At this time my recommendations are to continue lifting restrictions of 20 pounds and limit shift duration to 8 hours until she follows up with Dr. Canales on 5/23/2023.  She may return to work on 5/17/2023 at her next scheduled shift as long as these restrictions remain in place.  Plan:   Continue lifting restrictions of 20 pounds, this includes pushing and pulling as well.  Limit shift duration to 8 hours.  Chiropractic care is recommended.  Follow-up with Dr. Canales on 5/23/2023 for reevaluation.    Discussed warning signs/symptoms indicative of need to f/u    Follow up if symptoms persist or worsen or concerns    I have reviewed the nursing notes.  I have reviewed the findings, diagnosis, plan and need for follow up with the patient.    I explained my diagnostic considerations and recommendations to the patient, who voiced understanding and agreement with the treatment plan. All questions were answered. We discussed potential side effects of any prescribed or recommended therapies, as well as expectations for response to  treatments.    FARA DUMONT, APRN CNP  5/15/2023  1:18 PM    HPI:    Laverne Hanson is a 43 year old female  who presents to [facility] today for concerns of WC.    Patient was evaluated in  on 5/3/23 for initial WC claim. Follow up with Dr. Almazan, director of occupational medicine, showed she sustained segmental joint dysfunction injury to cervical and thoracic spine due to lifting. She was placed on work restrictions including: Limiting lifting to 20 pounds and limiting work hours to 8 hours per shift.    She has been required to lift at work and has been required to work beyond the restrictions given per Dr. Almazan on 23. She feels that she cannot work for another shift due to work requiring her to continue lifting and working beyond recommended measures.     Today she notes that she feels neck pain persists but also feels pain radiating down her trapezius muscles. She has been using ice and heat. She is waiting to get into the chiropractor.     PCP: Margarito    Allergy to amoxicillin.     Past Medical History:   Diagnosis Date     Cannabis abuse, uncomplicated     use found on urine drug screen     Encounter for general adult medical examination without abnormal findings     No Comments Provided     Essential (primary) hypertension          Gastro-esophageal reflux disease without esophagitis     No Comments Provided     Low back pain     No Comments Provided     Morbid (severe) obesity due to excess calories (H)     07,with a body mass index of 51     Other specified anxiety disorders     No Comments Provided     Otitis externa of both ears     Recurrent     Personal history of other medical treatment (CODE)      I, para 1.     Tobacco use     No Comments Provided     Past Surgical History:   Procedure Laterality Date     ADENOIDECTOMY           ANKLE SURGERY           COLONOSCOPY  2014    hyperplastic, 10 year follow up     LAPAROSCOPIC TUBAL LIGATION            OTHER SURGICAL HISTORY      10/86,60603.0,ME CREATE EARDRUM OPENING GEN ANESTH     OTHER SURGICAL HISTORY      1980,59862.0,ME CREATE EARDRUM OPENING GEN ANESTH     OTHER SURGICAL HISTORY      1992,912329,OTHER, with left PE tube     OTHER SURGICAL HISTORY      01/94,506857,OTHER     OTHER SURGICAL HISTORY      05/22/07,666532,OTHER, for obesity, Dr. Hogue, [place]     Social History     Tobacco Use     Smoking status: Former     Packs/day: 0.25     Types: Cigarettes     Smokeless tobacco: Never   Vaping Use     Vaping status: Never Used   Substance Use Topics     Alcohol use: Yes     Current Outpatient Medications   Medication Sig Dispense Refill     B Complex-C-E-Zn (STRESS B-COMPLEX/VIT C/ZINC) TABS TAKE 1 TABLET BY MOUTH EVERY MORNING 90 tablet 2     buPROPion (WELLBUTRIN XL) 300 MG 24 hr tablet Take 1 tablet by mouth daily at 2 pm       Cholecalciferol (VITAMIN D3) 2000 units CAPS Take 1 tablet by mouth daily 30 capsule 11     cyanocobalamin (CYANOCOBALAMIN) 1000 MCG/ML injection INJECT 1 ML (CC) INTRAMUSCULARLY ONCE EVERY MONTH 3 mL 11     cyclobenzaprine (FLEXERIL) 10 MG tablet Take 1 tablet by mouth 3 times daily       cyclobenzaprine (FLEXERIL) 5 MG tablet Take 1 tablet (5 mg) by mouth 2 times daily as needed for muscle spasms 20 tablet 0     gabapentin (NEURONTIN) 300 MG capsule Take 1 capsule (300 mg) by mouth 3 times daily 270 capsule 4     levothyroxine (SYNTHROID/LEVOTHROID) 137 MCG tablet Take 1 tablet (137 mcg) by mouth daily 90 tablet 3     lisinopril-hydrochlorothiazide (ZESTORETIC) 20-12.5 MG tablet Take 2 tablets by mouth once daily 180 tablet 4     LORazepam (ATIVAN) 1 MG tablet Take 1 tablet (1 mg) by mouth 2 times daily as needed for anxiety 30 tablet 0     Multiple Vitamin (MULTI-VITAMINS) TABS Take 1 tablet by mouth       predniSONE (DELTASONE) 20 MG tablet Take 3 tabs by mouth daily x 3 days, then 2 tabs daily x 3 days, then 1 tab daily x 3 days, then 1/2 tab daily x 3 days. 20 tablet 0  "    syringe/needle, disp, (BD ECLIPSE SYRINGE) 25G X 1\" 3 ML MISC Inject 1 each into the muscle every 30 days 12 each 1     traMADol (ULTRAM) 50 MG tablet Take 1 tablet (50 mg) by mouth every 6 hours as needed for severe pain 120 tablet 0     vitamin B complex with vitamin C (VITAMIN  B COMPLEX) tablet Take 1 tablet by mouth daily       desvenlafaxine (PRISTIQ) 50 MG 24 hr tablet Take 100 mg by mouth daily  (Patient not taking: Reported on 5/3/2023)       venlafaxine (EFFEXOR-XR) 150 MG 24 hr capsule Take 1 capsule (150 mg) by mouth daily (Patient not taking: Reported on 5/3/2023) 90 capsule 4     Allergies   Allergen Reactions     Amoxicillin Other (See Comments)     Pt reports it is ineffective for her personally     Past medical history, past surgical history, current medications and allergies reviewed and accurate to the best of my knowledge.      ROS:  Refer to HPI    There were no vitals taken for this visit.    EXAM:  General Appearance: Well appearing 43 year old female, appropriate appearance for age. No acute distress   Eyes: conjunctivae normal without erythema or irritation, corneas clear, no drainage or crusting, no eyelid swelling, pupils equal   Oropharynx: moist mucous membranes, voice clear.    Nose:  Bilateral nares: no erythema, no edema, no drainage or congestion   Neck: supple without adenopathy  Respiratory: normal chest wall and respirations.  Normal effort.  Clear to auscultation bilaterally, no wheezing, crackles or rhonchi.  No increased work of breathing.  No cough appreciated.  Cardiac: RRR with no murmurs  Abdomen: soft, nontender, no rigidity, no rebound tenderness or guarding, normal bowel sounds present  Musculoskeletal:   Equal movement of bilateral lower extremities.  Normal gait.    Neck: Tenderness to cervical spinal process.  Tenderness to bilateral trapezius.  No erythema, swelling, or ecchymosis.  Skin CDI. Motor Testing:   - C4: scapular stabilization normal  - C5: shoulder " abduction, IR and ER, elbow flexion palm up normal  - C6: elbow flexion thumb up, wrist extension, wrist supination normal  - C7: elbow flexion, wrist flexion normal, wrist pronation normal  - C8: MCP/PIP flexion, DIP motion, thumb extension normal  - T1: finger abduction normal  - Spurling Compression test for radiculopathy: Negative today.    Dermatological: no rashes noted of exposed skin  Neuro: Alert and oriented to person, place, and time.  Cranial nerves II-XII grossly intact with no focal or lateralizing deficits.  Muscle tone normal.  Gait normal. No tremor.   Psychological: normal affect, alert, oriented, and pleasant.     PATIENT HEALTH QUESTIONNAIRE-9 (PHQ - 9)    Over the last 2 weeks, how often have you been bothered by any of the following problems?    1. Little interest or pleasure in doing things -  Several days   2. Feeling down, depressed, or hopeless -  Several days   3. Trouble falling or staying asleep, or sleeping too much - Several days   4. Feeling tired or having little energy -  Several days   5. Poor appetite or overeating -  Several days   6. Feeling bad about yourself - or that you are a failure or have let yourself or your family down -  Several days   7. Trouble concentrating on things, such as reading the newspaper or watching television - Several days   8. Moving or speaking so slowly that other people could have noticed? Or the opposite - being so fidgety or restless that you have been moving around a lot more than usual Not at all   9. Thoughts that you would be better off dead or of hurting  yourself in some way Not at all   Total Score: 7     If you checked off any problems, how difficult have these problems made it for you to do your work, take care of things at home, or get along with other people?      Developed by Lou Gilbert, Roro Poe, Lobo Mancuso and colleagues, with an educational lalitha from [employer] No permission required to reproduce,  translate, display or distribute. permission required to reproduce, translate, display or distribute.      Answers for HPI/ROS submitted by the patient on 5/15/2023  If you checked off any problems, how difficult have these problems made it for you to do your work, take care of things at home, or get along with other people?: Somewhat difficult  PHQ9 TOTAL SCORE: 7

## 2023-05-23 ENCOUNTER — OFFICE VISIT (OUTPATIENT)
Dept: FAMILY MEDICINE | Facility: OTHER | Age: 43
End: 2023-05-23
Attending: CHIROPRACTOR
Payer: OTHER MISCELLANEOUS

## 2023-05-23 VITALS
HEART RATE: 96 BPM | RESPIRATION RATE: 17 BRPM | HEIGHT: 65 IN | BODY MASS INDEX: 43.49 KG/M2 | OXYGEN SATURATION: 98 % | DIASTOLIC BLOOD PRESSURE: 86 MMHG | WEIGHT: 261 LBS | TEMPERATURE: 98 F | SYSTOLIC BLOOD PRESSURE: 122 MMHG

## 2023-05-23 DIAGNOSIS — M99.02 SEGMENTAL DYSFUNCTION OF THORACIC REGION: ICD-10-CM

## 2023-05-23 DIAGNOSIS — M99.01 SEGMENTAL DYSFUNCTION OF CERVICAL REGION: ICD-10-CM
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
## 2023-05-23 DIAGNOSIS — M54.2 NECK PAIN: ICD-10-CM

## 2023-05-23 DIAGNOSIS — Z02.6 ENCOUNTER RELATED TO WORKER'S COMPENSATION CLAIM: Primary | ICD-10-CM

## 2023-05-23 PROCEDURE — 99213 OFFICE O/P EST LOW 20 MIN: CPT | Performed by: CHIROPRACTOR

## 2023-05-23 ASSESSMENT — PATIENT HEALTH QUESTIONNAIRE - PHQ9
10. IF YOU CHECKED OFF ANY PROBLEMS, HOW DIFFICULT HAVE THESE PROBLEMS MADE IT FOR YOU TO DO YOUR WORK, TAKE CARE OF THINGS AT HOME, OR GET ALONG WITH OTHER PEOPLE: SOMEWHAT DIFFICULT
SUM OF ALL RESPONSES TO PHQ QUESTIONS 1-9: 6
SUM OF ALL RESPONSES TO PHQ QUESTIONS 1-9: 6

## 2023-05-23 ASSESSMENT — PAIN SCALES - GENERAL: PAINLEVEL: MILD PAIN (3)

## 2023-05-23 NOTE — PROGRESS NOTES
CHIEF COMPLAINT:   Chief Complaint   Patient presents with     Work Comp       HISTORY OF PRESENTING INJURY     Laverne reports today with some improvement after her care with Dr. Gustafson.  She reports needing to take the day off Monday due to pain.  She does admit gardening and putting out solar lights on , but stayed within her restrictions. Most of her discomfort is at the end of the day and when going to bed.  Her right neck and upper c/t junction become increasingly painful at that time, reporting it as burning. This does not travel down the arm, but does cause/intensify headaches.  She is asking for pain control medication today.    Oswestry (PALAK) Questionnaire        2023    12:56 PM   OSWESTRY DISABILITY INDEX   Count 8   Sum 12   Oswestry Score (%) 30 %        PAST MEDICAL HISTORY:  Past Medical History:   Diagnosis Date     Cannabis abuse, uncomplicated     use found on urine drug screen     Encounter for general adult medical examination without abnormal findings     No Comments Provided     Essential (primary) hypertension          Gastro-esophageal reflux disease without esophagitis     No Comments Provided     Low back pain     No Comments Provided     Morbid (severe) obesity due to excess calories (H)     07,with a body mass index of 51     Other specified anxiety disorders     No Comments Provided     Otitis externa of both ears     Recurrent     Personal history of other medical treatment (CODE)      I, para 1.     Tobacco use     No Comments Provided       PAST SURGICAL HISTORY:  Past Surgical History:   Procedure Laterality Date     ADENOIDECTOMY           ANKLE SURGERY           COLONOSCOPY  2014    hyperplastic, 10 year follow up     LAPAROSCOPIC TUBAL LIGATION           OTHER SURGICAL HISTORY      10/86,47199.0,SD CREATE EARDRUM OPENING GEN ANESTH     OTHER SURGICAL HISTORY      ,18461.0,SD CREATE EARDRUM OPENING GEN ANESTH     OTHER SURGICAL  "HISTORY      1992,231870,OTHER, with left PE tube     OTHER SURGICAL HISTORY      01/94,990948,OTHER     OTHER SURGICAL HISTORY      05/22/07,493238,OTHER, for obesity, Wandy Dunn       ALLERGIES:  Allergies   Allergen Reactions     Amoxicillin Other (See Comments)     Pt reports it is ineffective for her personally       CURRENT MEDICATIONS:  Current Outpatient Medications   Medication Sig Dispense Refill     B Complex-C-E-Zn (STRESS B-COMPLEX/VIT C/ZINC) TABS TAKE 1 TABLET BY MOUTH EVERY MORNING 90 tablet 2     buPROPion (WELLBUTRIN XL) 300 MG 24 hr tablet Take 1 tablet by mouth daily at 2 pm       Cholecalciferol (VITAMIN D3) 2000 units CAPS Take 1 tablet by mouth daily 30 capsule 11     cyanocobalamin (CYANOCOBALAMIN) 1000 MCG/ML injection INJECT 1 ML (CC) INTRAMUSCULARLY ONCE EVERY MONTH 3 mL 11     cyclobenzaprine (FLEXERIL) 10 MG tablet Take 1 tablet by mouth 3 times daily       cyclobenzaprine (FLEXERIL) 5 MG tablet Take 1 tablet (5 mg) by mouth 2 times daily as needed for muscle spasms 20 tablet 0     gabapentin (NEURONTIN) 300 MG capsule Take 1 capsule (300 mg) by mouth 3 times daily 270 capsule 4     levothyroxine (SYNTHROID/LEVOTHROID) 137 MCG tablet Take 1 tablet (137 mcg) by mouth daily 90 tablet 3     lisinopril-hydrochlorothiazide (ZESTORETIC) 20-12.5 MG tablet Take 2 tablets by mouth once daily 180 tablet 4     LORazepam (ATIVAN) 1 MG tablet Take 1 tablet (1 mg) by mouth 2 times daily as needed for anxiety 30 tablet 0     Multiple Vitamin (MULTI-VITAMINS) TABS Take 1 tablet by mouth       predniSONE (DELTASONE) 20 MG tablet Take 3 tabs by mouth daily x 3 days, then 2 tabs daily x 3 days, then 1 tab daily x 3 days, then 1/2 tab daily x 3 days. 20 tablet 0     syringe/needle, disp, (BD ECLIPSE SYRINGE) 25G X 1\" 3 ML MISC Inject 1 each into the muscle every 30 days 12 each 1     traMADol (ULTRAM) 50 MG tablet Take 1 tablet (50 mg) by mouth every 6 hours as needed for severe pain 120 tablet " "0     vitamin B complex with vitamin C (VITAMIN  B COMPLEX) tablet Take 1 tablet by mouth daily         SOCIAL HISTORY:  Unremarkable     FAMILY HISTORY:  Family History   Problem Relation Age of Onset     Hypertension Mother         Hypertension     Other - See Comments Mother         History of depression     Hypertension Brother         Hypertension       REVIEW OF SYSTEMS:    Unremarkable       PHYSICAL EXAM:   /86   Pulse 96   Temp 98  F (36.7  C) (Tympanic)   Resp 17   Ht 1.651 m (5' 5\")   Wt 118.4 kg (261 lb)   SpO2 98%   BMI 43.43 kg/m   Body mass index is 43.43 kg/m . General Appearance: No acute distress, her mood is improved. Cervical spine ROM is improved today in all planes.  Flexion is still limited.  Spurlings is positive. Raza's on the right is negative for radiculopathy.  Foraminal encroachment is negative.  Strength is improved as well,  strength is measured at 48 lbs on the right, 64 lbs on the left.  Codman's negative, and there is no strength loss at waistline.      IMPRESSION/PLAN:    She is improved and responding to the chiropractic treatments.  I would like to see her continue these, approximately 3-4 additional sessions and she should be close to MMI.  Will plan to follow up in three weeks for re-eval and workability update.     With regards to missed work on Monday, she will contact Dr. Gustafson for a temporary one day workability form.  She also plans to make an appointment with her family doctor for evaluation of pain control medication as I am unable to provide that for her.      Total time spent today in chart review/preparation: 7 minutes  Total time spent today in face to face evaluation: 16 minutes  Total time spent today in documentation: 8 minutes.        Brian Almazan DC, CLARISSA, QUAN  Director - Occupational Medicine Department  Diplomate of the American Board of Forensic Professionals  Board Certified - American Board of Independent Medical Examiners  [facility]  [address] [place] [address]  Phone [phone]  Fax [phone]    1:45 PM 5/23/2023

## 2023-06-03 ENCOUNTER — HEALTH MAINTENANCE LETTER (OUTPATIENT)
Age: 43
End: 2023-06-03

## 2023-06-09 NOTE — TELEPHONE ENCOUNTER
Impression: Vitreous degeneration, left eye: H43.832. Plan: Pt educated on condition. Reviewed signs and symptoms of retinal detachment. Pt advised to RTC ASAP with sudden decreased vision, loss of peripheral vision, or new onset flashes/significant floaters. Monitor yearly. Patient Information     Patient Name MRN Laverne Harrison [id number] Female 1980      Telephone Encounter by Luz Marina Ross RN at 10/2/2017  9:00 AM     Author:  Luz Marina Ross RN Service:  (none) Author Type:  NURS- Registered Nurse     Filed:  10/2/2017  9:51 AM Encounter Date:  10/2/2017 Status:  Signed     :  Luz Marina Ross RN (NURS- Registered Nurse)            ,   Pt called inquiring about her Tramadol, again.  I explained that the mediation was denied because she needed an appointment.   She states she was just in 2017 and that visit should be sufficient for Tramadol refill.  The medication or diagnosis does not appear to be addressed.  Am I correct in instructing her to schedule an appointment?  Please review and advise.  Luz Marina Ross RN ....................  10/2/2017   9:28 AM

## 2023-06-13 ENCOUNTER — OFFICE VISIT (OUTPATIENT)
Dept: FAMILY MEDICINE | Facility: OTHER | Age: 43
End: 2023-06-13
Attending: CHIROPRACTOR
Payer: OTHER MISCELLANEOUS

## 2023-06-13 VITALS
WEIGHT: 259 LBS | RESPIRATION RATE: 17 BRPM | BODY MASS INDEX: 43.15 KG/M2 | OXYGEN SATURATION: 99 % | HEIGHT: 65 IN | SYSTOLIC BLOOD PRESSURE: 137 MMHG | DIASTOLIC BLOOD PRESSURE: 86 MMHG | HEART RATE: 98 BPM

## 2023-06-13 DIAGNOSIS — Z02.6 ENCOUNTER RELATED TO WORKER'S COMPENSATION CLAIM: Primary | ICD-10-CM
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
## 2023-06-13 DIAGNOSIS — M99.01 SEGMENTAL DYSFUNCTION OF CERVICAL REGION: ICD-10-CM

## 2023-06-13 DIAGNOSIS — M99.02 SEGMENTAL DYSFUNCTION OF THORACIC REGION: ICD-10-CM

## 2023-06-13 DIAGNOSIS — M54.2 NECK PAIN: ICD-10-CM

## 2023-06-13 PROCEDURE — 99213 OFFICE O/P EST LOW 20 MIN: CPT | Performed by: CHIROPRACTOR

## 2023-06-13 ASSESSMENT — PATIENT HEALTH QUESTIONNAIRE - PHQ9
SUM OF ALL RESPONSES TO PHQ QUESTIONS 1-9: 3
10. IF YOU CHECKED OFF ANY PROBLEMS, HOW DIFFICULT HAVE THESE PROBLEMS MADE IT FOR YOU TO DO YOUR WORK, TAKE CARE OF THINGS AT HOME, OR GET ALONG WITH OTHER PEOPLE: SOMEWHAT DIFFICULT
SUM OF ALL RESPONSES TO PHQ QUESTIONS 1-9: 3

## 2023-06-13 ASSESSMENT — PAIN SCALES - GENERAL: PAINLEVEL: NO PAIN (0)

## 2023-06-13 NOTE — PROGRESS NOTES
"    Chief Complaint   Patient presents with     Work Comp       HISTORY OF PRESENTING WORK INJURY     Patient sought chiropractic treatment twice since my last evaluation.  In addition of this she changed jobs to a less labor-intensive position.  As a result, she has full resolution of her initial injuries and \"wants to be released\" from her work comp case today.  She denies any radicular symptoms, strength loss, or sensory changes in the upper extremities.    Oswestry (PALAK) Questionnaire  Cervical spine: 0%          PAST MEDICAL HISTORY:  Past Medical History:   Diagnosis Date     Cannabis abuse, uncomplicated     use found on urine drug screen     Encounter for general adult medical examination without abnormal findings     No Comments Provided     Essential (primary) hypertension          Gastro-esophageal reflux disease without esophagitis     No Comments Provided     Low back pain     No Comments Provided     Morbid (severe) obesity due to excess calories (H)     07,with a body mass index of 51     Other specified anxiety disorders     No Comments Provided     Otitis externa of both ears     Recurrent     Personal history of other medical treatment (CODE)      I, para 1.     Tobacco use     No Comments Provided       PAST SURGICAL HISTORY:  Past Surgical History:   Procedure Laterality Date     ADENOIDECTOMY           ANKLE SURGERY           COLONOSCOPY  2014    hyperplastic, 10 year follow up     LAPAROSCOPIC TUBAL LIGATION           OTHER SURGICAL HISTORY      10/86,30693.0,TX CREATE EARDRUM OPENING GEN ANESTH     OTHER SURGICAL HISTORY      ,18840.0,TX CREATE EARDRUM OPENING GEN ANESTH     OTHER SURGICAL HISTORY      ,394703,OTHER, with left PE tube     OTHER SURGICAL HISTORY      ,450642,OTHER     OTHER SURGICAL HISTORY      07,632452,OTHER, for obesity, Wandy Dunn       ALLERGIES:  Allergies   Allergen Reactions     Amoxicillin Other (See " "Comments)     Pt reports it is ineffective for her personally       CURRENT MEDICATIONS:  Current Outpatient Medications   Medication Sig Dispense Refill     B Complex-C-E-Zn (STRESS B-COMPLEX/VIT C/ZINC) TABS TAKE 1 TABLET BY MOUTH EVERY MORNING 90 tablet 2     buPROPion (WELLBUTRIN XL) 300 MG 24 hr tablet Take 1 tablet by mouth daily at 2 pm       Cholecalciferol (VITAMIN D3) 2000 units CAPS Take 1 tablet by mouth daily 30 capsule 11     cyanocobalamin (CYANOCOBALAMIN) 1000 MCG/ML injection INJECT 1 ML (CC) INTRAMUSCULARLY ONCE EVERY MONTH 3 mL 11     cyclobenzaprine (FLEXERIL) 10 MG tablet Take 1 tablet by mouth 3 times daily       cyclobenzaprine (FLEXERIL) 5 MG tablet Take 1 tablet (5 mg) by mouth 2 times daily as needed for muscle spasms 20 tablet 0     gabapentin (NEURONTIN) 300 MG capsule Take 1 capsule (300 mg) by mouth 3 times daily 270 capsule 4     levothyroxine (SYNTHROID/LEVOTHROID) 137 MCG tablet Take 1 tablet (137 mcg) by mouth daily 90 tablet 3     lisinopril-hydrochlorothiazide (ZESTORETIC) 20-12.5 MG tablet Take 2 tablets by mouth once daily 180 tablet 4     LORazepam (ATIVAN) 1 MG tablet Take 1 tablet (1 mg) by mouth 2 times daily as needed for anxiety 30 tablet 0     Multiple Vitamin (MULTI-VITAMINS) TABS Take 1 tablet by mouth       predniSONE (DELTASONE) 20 MG tablet Take 3 tabs by mouth daily x 3 days, then 2 tabs daily x 3 days, then 1 tab daily x 3 days, then 1/2 tab daily x 3 days. 20 tablet 0     syringe/needle, disp, (BD ECLIPSE SYRINGE) 25G X 1\" 3 ML MISC Inject 1 each into the muscle every 30 days 12 each 1     traMADol (ULTRAM) 50 MG tablet Take 1 tablet (50 mg) by mouth every 6 hours as needed for severe pain 120 tablet 0     vitamin B complex with vitamin C (VITAMIN  B COMPLEX) tablet Take 1 tablet by mouth daily         SOCIAL HISTORY:  Social History     Socioeconomic History     Marital status:      Spouse name: Not on file     Number of children: Not on file     Years of " "education: Not on file     Highest education level: Not on file   Occupational History     Not on file   Tobacco Use     Smoking status: Former     Packs/day: 0.25     Types: Cigarettes     Smokeless tobacco: Never   Vaping Use     Vaping status: Never Used   Substance and Sexual Activity     Alcohol use: Yes     Drug use: No     Sexual activity: Yes     Partners: Male     Birth control/protection: Surgical   Other Topics Concern     Parent/sibling w/ CABG, MI or angioplasty before 65F 55M? Not Asked   Social History Narrative    Patient is a CNA working at [employer]       to Lenny, 2010    1 Daughter, Anjali, 2/12/2005    1 Step daughter     Social Determinants of Health     Financial Resource Strain: Not on file   Food Insecurity: Not on file   Transportation Needs: Not on file   Physical Activity: Not on file   Stress: Not on file   Social Connections: Not on file   Intimate Partner Violence: Not on file   Housing Stability: Not on file       FAMILY HISTORY:  Family History   Problem Relation Age of Onset     Hypertension Mother         Hypertension     Other - See Comments Mother         History of depression     Hypertension Brother         Hypertension       REVIEW OF SYSTEMS:    Unremarkable    PHYSICAL EXAM:   /86   Pulse 98   Resp 17   Ht 1.651 m (5' 5\")   Wt 117.5 kg (259 lb)   LMP  (Exact Date)   SpO2 99%   BMI 43.10 kg/m   Body mass index is 43.1 kg/m . General Appearance: Pleasant, alert, appropriate appearance for age. No acute distress.  Transitions without difficulty.  She demonstrates full range of motion of the cervical spine in all planes.  Upper extremity strength is 5 out of 5 bilaterally.  She has a negative Jacksons and maximum foraminal encroachment test.  Spurling's is negative flexion extension.  No segmental joint dysfunction or spasm noted on palpation today.      IMPRESSION/PLAN:    Patient is at [facility] for original work comp injury to the upper back and neck " region.  No further injury is warranted at this time and she is released to full work duties without restriction.  2 copies of the new workability were issued to her after she signed it and she had no additional questions and was very appreciative after today's encounter.    Total time spent today in chart review/preparation: 5 minutes  Total time spent today in face to face evaluation: 13 minutes  Total time spent today in documentation: 8 minutes.        Brian Almazan DC, FATOUMATAFP, CICE  Director - Occupational Medicine Department  Diplomate of the American Board of Forensic Professionals  Board Certified - American Board of Independent Medical Examiners  [facility]  [address] [place] [address]  Phone [phone]  Fax [phone]    1:25 PM 6/13/2023

## 2023-06-17 NOTE — TELEPHONE ENCOUNTER
Patient Information     Patient Name MRN Sex Laverne Jeff [id number] Female 1980      Telephone Encounter by Luz Marina Ross RN at 2018 12:41 PM     Author:  Luz Marina Ross RN Service:  (none) Author Type:  NURS- Registered Nurse     Filed:  2018 12:52 PM Encounter Date:  2018 Status:  Signed     :  Luz Marina Ross RN (NURS- Registered Nurse)            ,  Pt is requesting a refill of Klonopin last filled 2017.  Pt was last seen 2018 and medications appear to have been reviewed.  Please review and advise or sign if appropriate.  Medication is angus'd up per request    This is a Refill request from: [facility]  Name of Medication: Klonopin  Quantity requested: 90  Last fill date: 2017  Due for refill: yes  Last visit with ALIN PIMENTEL was on: 2018 in [facility]  PCP:  Alin Pimentel MD  Controlled Substance Agreement:  NA   Diagnosis r/t this medication request: TERESA     Unable to complete prescription refill per RN Medication Refill Policy.................... Luz Marina Ross RN ....................  2018   12:46 PM           6/17/2023

## 2023-11-18 DIAGNOSIS — R79.89 ABNORMAL TSH: ICD-10-CM

## 2023-11-21 RX ORDER — LEVOTHYROXINE SODIUM 137 UG/1
137 TABLET ORAL DAILY
Qty: 90 TABLET | Refills: 0 | Status: SHIPPED | OUTPATIENT
Start: 2023-11-21

## 2023-11-21 NOTE — TELEPHONE ENCOUNTER
[facility] sent Rx request for the following:      Requested Prescriptions   Pending Prescriptions Disp Refills    levothyroxine (SYNTHROID/LEVOTHROID) 137 MCG tablet [Pharmacy Med Name: Levothyroxine Sodium 137 MCG Oral Tablet] 90 tablet 0     Sig: Take 1 tablet by mouth once daily       Thyroid Protocol Failed - 11/18/2023  2:15 PM     Last Prescription Date:   9/14/22  Last Fill Qty/Refills:         90, R-3    Last Office Visit:              3/3/22   Future Office visit:           none     Due for office visit, please call to schedule.  Kika Bacon RN on 11/21/2023 at 10:32 AM

## 2023-12-27 ENCOUNTER — APPOINTMENT (OUTPATIENT)
Dept: LAB | Facility: OTHER | Age: 43
End: 2023-12-27
Attending: STUDENT IN AN ORGANIZED HEALTH CARE EDUCATION/TRAINING PROGRAM

## 2023-12-27 ENCOUNTER — LAB REQUISITION (OUTPATIENT)
Dept: LAB | Facility: OTHER | Age: 43
End: 2023-12-27

## 2023-12-27 DIAGNOSIS — E03.8 OTHER SPECIFIED HYPOTHYROIDISM: ICD-10-CM

## 2023-12-27 LAB
ALBUMIN SERPL BCG-MCNC: 4.4 G/DL (ref 3.5–5.2)
ALP SERPL-CCNC: 133 U/L (ref 40–150)
ALT SERPL W P-5'-P-CCNC: 41 U/L (ref 0–50)
ALT SERPL W P-5'-P-CCNC: 41 U/L (ref 0–50)
ANION GAP SERPL CALCULATED.3IONS-SCNC: 11 MMOL/L (ref 7–15)
AST SERPL W P-5'-P-CCNC: 53 U/L (ref 0–45)
AST SERPL W P-5'-P-CCNC: 53 U/L (ref 0–45)
BILIRUB SERPL-MCNC: 0.4 MG/DL
BUN SERPL-MCNC: 11.6 MG/DL (ref 6–20)
CALCIUM SERPL-MCNC: 9.4 MG/DL (ref 8.6–10)
CHLORIDE SERPL-SCNC: 102 MMOL/L (ref 98–107)
CHOLEST SERPL-MCNC: 164 MG/DL
CREAT SERPL-MCNC: 0.82 MG/DL (ref 0.51–0.95)
DEPRECATED HCO3 PLAS-SCNC: 24 MMOL/L (ref 22–29)
EGFRCR SERPLBLD CKD-EPI 2021: >90 ML/MIN/1.73M2
ERYTHROCYTE [DISTWIDTH] IN BLOOD BY AUTOMATED COUNT: 12.7 % (ref 10–15)
FASTING STATUS PATIENT QL REPORTED: YES
GLUCOSE SERPL-MCNC: 116 MG/DL (ref 70–99)
HBA1C MFR BLD: 4.9 % (ref 4–6.2)
HCT VFR BLD AUTO: 38.7 % (ref 35–47)
HDLC SERPL-MCNC: 63 MG/DL
HGB BLD-MCNC: 13.5 G/DL (ref 11.7–15.7)
HOLD SPECIMEN: NORMAL
LDLC SERPL CALC-MCNC: 81 MG/DL
MCH RBC QN AUTO: 33.8 PG (ref 26.5–33)
MCHC RBC AUTO-ENTMCNC: 34.9 G/DL (ref 31.5–36.5)
MCV RBC AUTO: 97 FL (ref 78–100)
NONHDLC SERPL-MCNC: 101 MG/DL
PLATELET # BLD AUTO: 373 10E3/UL (ref 150–450)
POTASSIUM SERPL-SCNC: 4.3 MMOL/L (ref 3.4–5.3)
PROT SERPL-MCNC: 7.3 G/DL (ref 6.4–8.3)
RBC # BLD AUTO: 3.99 10E6/UL (ref 3.8–5.2)
SODIUM SERPL-SCNC: 137 MMOL/L (ref 135–145)
TRIGL SERPL-MCNC: 101 MG/DL
TSH SERPL DL<=0.005 MIU/L-ACNC: 3.56 UIU/ML (ref 0.3–4.2)
WBC # BLD AUTO: 8.5 10E3/UL (ref 4–11)

## 2023-12-27 PROCEDURE — 85027 COMPLETE CBC AUTOMATED: CPT | Performed by: NURSE PRACTITIONER

## 2023-12-27 PROCEDURE — 84443 ASSAY THYROID STIM HORMONE: CPT | Performed by: NURSE PRACTITIONER

## 2023-12-27 PROCEDURE — 80053 COMPREHEN METABOLIC PANEL: CPT | Performed by: NURSE PRACTITIONER

## 2023-12-27 PROCEDURE — 83036 HEMOGLOBIN GLYCOSYLATED A1C: CPT | Performed by: NURSE PRACTITIONER

## 2023-12-27 PROCEDURE — 36415 COLL VENOUS BLD VENIPUNCTURE: CPT | Performed by: NURSE PRACTITIONER

## 2023-12-27 PROCEDURE — 80061 LIPID PANEL: CPT | Performed by: NURSE PRACTITIONER

## 2024-01-10 DIAGNOSIS — E53.8 B12 DEFICIENCY: ICD-10-CM

## 2024-01-11 RX ORDER — CYANOCOBALAMIN 1000 UG/ML
INJECTION, SOLUTION INTRAMUSCULAR; SUBCUTANEOUS
Qty: 3 ML | Refills: 0 | Status: SHIPPED | OUTPATIENT
Start: 2024-01-11 | End: 2024-04-09

## 2024-01-11 NOTE — TELEPHONE ENCOUNTER
[id number] of [place] sent Rx request for the following:      Requested Prescriptions   Pending Prescriptions Disp Refills    cyanocobalamin (CYANOCOBALAMIN) 1000 MCG/ML injection [Pharmacy Med Name: Cyanocobalamin 1000 MCG/ML Injection Solution] 3 mL 0     Sig: INJECT 1 ML  INTRAMUSCULARLY ONCE EVERY MONTH          Last Prescription Date:   12/16/22  Last Fill Qty/Refills:         3 ml, R-11    Last Office Visit:              3/3/22   Future Office visit:           none    Prescription approved per [facility] Refill Protocol.    SHARI OLIVA RN on 1/11/2024 at 9:44 AM

## 2024-02-17 ENCOUNTER — HEALTH MAINTENANCE LETTER (OUTPATIENT)
Age: 44
End: 2024-02-17

## 2024-02-27 ENCOUNTER — LAB REQUISITION (OUTPATIENT)
Dept: LAB | Facility: OTHER | Age: 44
End: 2024-02-27

## 2024-02-27 ENCOUNTER — APPOINTMENT (OUTPATIENT)
Dept: LAB | Facility: OTHER | Age: 44
End: 2024-02-27
Attending: HOSPITALIST

## 2024-02-27 LAB
ALBUMIN SERPL BCG-MCNC: 4.4 G/DL (ref 3.5–5.2)
ALP SERPL-CCNC: 122 U/L (ref 40–150)
ALT SERPL W P-5'-P-CCNC: 34 U/L (ref 0–50)
ANION GAP SERPL CALCULATED.3IONS-SCNC: 10 MMOL/L (ref 7–15)
AST SERPL W P-5'-P-CCNC: 38 U/L (ref 0–45)
BASOPHILS # BLD AUTO: 0 10E3/UL (ref 0–0.2)
BASOPHILS NFR BLD AUTO: 0 %
BILIRUB SERPL-MCNC: 0.8 MG/DL
BUN SERPL-MCNC: 13.8 MG/DL (ref 6–20)
CALCIUM SERPL-MCNC: 9.5 MG/DL (ref 8.6–10)
CHLORIDE SERPL-SCNC: 102 MMOL/L (ref 98–107)
CHOLEST SERPL-MCNC: 156 MG/DL
CREAT SERPL-MCNC: 0.79 MG/DL (ref 0.51–0.95)
DEPRECATED HCO3 PLAS-SCNC: 26 MMOL/L (ref 22–29)
EGFRCR SERPLBLD CKD-EPI 2021: >90 ML/MIN/1.73M2
EOSINOPHIL # BLD AUTO: 0.2 10E3/UL (ref 0–0.7)
EOSINOPHIL NFR BLD AUTO: 2 %
ERYTHROCYTE [DISTWIDTH] IN BLOOD BY AUTOMATED COUNT: 11.6 % (ref 10–15)
FASTING STATUS PATIENT QL REPORTED: NORMAL
GLUCOSE SERPL-MCNC: 95 MG/DL (ref 70–99)
HCT VFR BLD AUTO: 36.7 % (ref 35–47)
HDLC SERPL-MCNC: 64 MG/DL
HGB BLD-MCNC: 13 G/DL (ref 11.7–15.7)
IMM GRANULOCYTES # BLD: 0 10E3/UL
IMM GRANULOCYTES NFR BLD: 0 %
LDLC SERPL CALC-MCNC: 75 MG/DL
LYMPHOCYTES # BLD AUTO: 2.4 10E3/UL (ref 0–5.3)
LYMPHOCYTES NFR BLD AUTO: 24 %
MCH RBC QN AUTO: 33.7 PG (ref 26.5–33)
MCHC RBC AUTO-ENTMCNC: 35.4 G/DL (ref 31.5–36.5)
MCV RBC AUTO: 95 FL (ref 78–100)
MONOCYTES # BLD AUTO: 0.7 10E3/UL (ref 0–1.3)
MONOCYTES NFR BLD AUTO: 8 %
NEUTROPHILS # BLD AUTO: 6.3 10E3/UL (ref 1.6–8.3)
NEUTROPHILS NFR BLD AUTO: 66 %
NONHDLC SERPL-MCNC: 92 MG/DL
NRBC # BLD AUTO: 0 10E3/UL
NRBC BLD AUTO-RTO: 0 /100
PLATELET # BLD AUTO: 270 10E3/UL (ref 150–450)
POTASSIUM SERPL-SCNC: 3.8 MMOL/L (ref 3.4–5.3)
PROT SERPL-MCNC: 7.3 G/DL (ref 6.4–8.3)
RBC # BLD AUTO: 3.86 10E6/UL (ref 3.8–5.2)
SODIUM SERPL-SCNC: 138 MMOL/L (ref 135–145)
TRIGL SERPL-MCNC: 86 MG/DL
TSH SERPL DL<=0.005 MIU/L-ACNC: 9.23 UIU/ML (ref 0.3–4.2)
WBC # BLD AUTO: 9.7 10E3/UL (ref 4–11)

## 2024-02-27 PROCEDURE — 36415 COLL VENOUS BLD VENIPUNCTURE: CPT | Performed by: NURSE PRACTITIONER

## 2024-02-27 PROCEDURE — 80061 LIPID PANEL: CPT | Performed by: NURSE PRACTITIONER

## 2024-02-27 PROCEDURE — 84443 ASSAY THYROID STIM HORMONE: CPT | Performed by: NURSE PRACTITIONER

## 2024-02-27 PROCEDURE — 80053 COMPREHEN METABOLIC PANEL: CPT | Performed by: NURSE PRACTITIONER

## 2024-02-27 PROCEDURE — 85025 COMPLETE CBC W/AUTO DIFF WBC: CPT | Performed by: NURSE PRACTITIONER

## 2024-04-04 DIAGNOSIS — E53.8 B12 DEFICIENCY: ICD-10-CM

## 2024-04-08 NOTE — TELEPHONE ENCOUNTER
[id number] of [place] sent Rx request for the following:      Requested Prescriptions   Pending Prescriptions Disp Refills    cyanocobalamin (CYANOCOBALAMIN) 1000 MCG/ML injection [Pharmacy Med Name: Cyanocobalamin 1000 MCG/ML Injection Solution] 3 mL 0     Sig: INJECT 1 ML INTRAMUSCULARLY  ONCE EVERY MONTH       Vitamin Supplements Protocol Passed - 4/8/2024  4:00 PM          Last Prescription Date:   1/11/24  Last Fill Qty/Refills:         3 ml, R-0    Last Office Visit:              3/3/22   Future Office visit:           none      Routing refill request to provider for review/approval because:  Elsa given x1 and patient did not follow up, please advise  Patient needs to be seen because it has been more than 1 year since last office visit.    Kylie Richards RN on 4/8/2024 at 4:01 PM

## 2024-04-09 RX ORDER — CYANOCOBALAMIN 1000 UG/ML
INJECTION, SOLUTION INTRAMUSCULAR; SUBCUTANEOUS
Qty: 3 ML | Refills: 0 | Status: SHIPPED | OUTPATIENT
Start: 2024-04-09

## 2024-04-11 DIAGNOSIS — E53.8 B12 DEFICIENCY: ICD-10-CM

## 2024-04-16 RX ORDER — SYRINGE WITH NEEDLE, 1 ML 25GX5/8"
SYRINGE, EMPTY DISPOSABLE MISCELLANEOUS
Qty: 12 EACH | Refills: 2 | Status: SHIPPED | OUTPATIENT
Start: 2024-04-16

## 2024-04-16 NOTE — TELEPHONE ENCOUNTER
"[id number] sent Rx request for the following:      Requested Prescriptions   Pending Prescriptions Disp Refills    B-D LUER-FAWAD SYRINGE 25G X 1\" 3 ML MISC [Pharmacy Med Name: BD 3ML LL SYRNG 25GX1\" MIS]  0     Sig: INJECT 1 EACH INTO THE MUSCLE EVERY 30 DAYS.       There is no refill protocol information for this order        Last Prescription Date:   11/10/22  Last Fill Qty/Refills:         12, R-1    Last Office Visit:              3/3/22 [facility]   Future Office visit:           None noted     Routing refill request to provider for review/approval because:  Drug not on the [facility] or [facility] refill protocol or controlled substance  Patient due for annual review. Two years since last visit. Will send to [address] appointment pool.     Diandra Thibodeaux RN ....................  4/16/2024   8:13 AM      "

## 2024-05-22 NOTE — LETTER
April 21, 2020      Laverne Nguyen   [address]  [place] [address]        Dear Laverne,     A refill of FLUOXETINE 40MG CAPSULES has been requested by your pharmacy.  We noticed that Dr. Pimentel had recommended a follow-up when you are able to discuss how you are doing on the increased dose of fluoxetine.  A limited 60 day supply has been sent to your pharmacy at this time.    Additional refills require a medication management appointment.  Your health is very important to us.  Please call the clinic at [phone] to schedule your appointment.  We now have telephone/video visits available from the comfort of your home.     Thank you,    The Refill Nurse  [facility]                25

## 2024-05-31 DIAGNOSIS — R79.89 ABNORMAL TSH: Primary | ICD-10-CM

## 2024-05-31 RX ORDER — LEVOTHYROXINE SODIUM 175 UG/1
1 TABLET ORAL
COMMUNITY
Start: 2024-02-28 | End: 2024-05-31

## 2024-05-31 NOTE — LETTER
[facility]  [address]  [place] [address]  [phone]      June 1, 2024    Laverne Hanson  [address]  [place] [address]    Dear Laverne Hanson,     We recently received a refill request(s) prescribed by Alin Pimentel.      We have forwarded your refill request to the provider for consideration to refill your medication for one time only.    In order to get any additional refills, call our scheduling center at [phone] to request a visit with your primary care provider for an annual physical.    Thank you,         [facility] Nick Baldwin RN

## 2024-06-01 NOTE — TELEPHONE ENCOUNTER
levothyroxine (SYNTHROID/LEVOTHROID) 175 MCG tablet   Requested Prescriptions   Pending Prescriptions Disp Refills    levothyroxine (SYNTHROID/LEVOTHROID) 175 MCG tablet 90 tablet 3     Sig: Take 1 tablet (175 mcg) by mouth daily at 2 pm       Thyroid Protocol Failed - 5/31/2024  3:58 PM        Failed - Medication indicated for associated diagnosis     Medication is associated with one or more of the following diagnoses:  Hypothyroidism  Thyroid stimulating hormone suppression therapy  Thyroid cancer          Failed - Normal TSH on file in past 12 months     Recent Labs   Lab Test 02/27/24  2217   TSH 9.23*              Passed - Patient is 12 years or older        Passed - Recent (12 mo) or future (30 days) visit within the authorizing provider's specialty     The patient must have completed an in-person or virtual visit within the past 12 months or has a future visit scheduled within the next 90 days with the authorizing provider s specialty.  Urgent care and e-visits do not quality as an office visit for this protocol.          Passed - Medication is active on med list        Passed - No active pregnancy on record     If patient is pregnant or has had a positive pregnancy test, please check TSH.          Passed - No positive pregnancy test in past 12 months     If patient is pregnant or has had a positive pregnancy test, please check TSH.              levothyroxine (SYNTHROID/LEVOTHROID) 175 MCG tablet   Last Written Prescription Date:  11/21/23  Last Fill Quantity: 90,   # refills: 0  Last Office Visit: 3/3/22  Future Office visit:       Routing refill request to provider for review/approval because:  Drug failed the [facility] or [facility] refill protocol.  Appointment reminder letter sent to patient. Unable to complete prescription refill per RNMedication Refill Policy.................... Laverne Engel RN ....................  6/1/2024   11:52 AM

## 2024-06-03 RX ORDER — LEVOTHYROXINE SODIUM 175 UG/1
175 TABLET ORAL
Qty: 30 TABLET | Refills: 0 | Status: SHIPPED | OUTPATIENT
Start: 2024-06-03 | End: 2024-08-22

## 2024-06-03 NOTE — TELEPHONE ENCOUNTER
"Note to pharmacy, with note stating \"follow up is due.\" Routing to scheduling pool. Valorie Castorena RN .............. 6/3/2024  1:02 PM      "

## 2024-06-03 NOTE — TELEPHONE ENCOUNTER
Pt does not have insurance, and so is currently going to the free clinic. She does not want to be scheduled at this time.  Informed her that only one refill was able to be filled without a visit, and she states she will ask her other provider to fill the medications.   Gretchen Montero on 6/3/2024 at 1:38 PM

## 2024-06-11 ENCOUNTER — LAB REQUISITION (OUTPATIENT)
Dept: LAB | Facility: OTHER | Age: 44
End: 2024-06-11

## 2024-06-11 LAB — TSH SERPL DL<=0.005 MIU/L-ACNC: 1.66 UIU/ML (ref 0.3–4.2)

## 2024-06-11 PROCEDURE — 84443 ASSAY THYROID STIM HORMONE: CPT | Performed by: NURSE PRACTITIONER

## 2024-06-11 PROCEDURE — 36415 COLL VENOUS BLD VENIPUNCTURE: CPT | Performed by: NURSE PRACTITIONER

## 2024-07-05 ENCOUNTER — HOSPITAL ENCOUNTER (EMERGENCY)
Facility: OTHER | Age: 44
Discharge: HOME | End: 2024-07-06
Attending: PHYSICIAN ASSISTANT | Admitting: PHYSICIAN ASSISTANT
Payer: OTHER MISCELLANEOUS

## 2024-07-05 VITALS
OXYGEN SATURATION: 99 % | BODY MASS INDEX: 43.15 KG/M2 | HEIGHT: 65 IN | RESPIRATION RATE: 20 BRPM | HEART RATE: 116 BPM | TEMPERATURE: 98.2 F | SYSTOLIC BLOOD PRESSURE: 140 MMHG | DIASTOLIC BLOOD PRESSURE: 103 MMHG | WEIGHT: 259 LBS

## 2024-07-05 DIAGNOSIS — M54.6 THORACIC SPINE PAIN: ICD-10-CM
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
## 2024-07-05 DIAGNOSIS — M54.2 NECK PAIN: ICD-10-CM

## 2024-07-05 DIAGNOSIS — M25.511 RIGHT SHOULDER PAIN: ICD-10-CM

## 2024-07-05 PROCEDURE — 99285 EMERGENCY DEPT VISIT HI MDM: CPT | Mod: 25 | Performed by: PHYSICIAN ASSISTANT

## 2024-07-05 PROCEDURE — 99284 EMERGENCY DEPT VISIT MOD MDM: CPT | Performed by: PHYSICIAN ASSISTANT

## 2024-07-05 ASSESSMENT — COLUMBIA-SUICIDE SEVERITY RATING SCALE - C-SSRS
1. IN THE PAST MONTH, HAVE YOU WISHED YOU WERE DEAD OR WISHED YOU COULD GO TO SLEEP AND NOT WAKE UP?: NO
6. HAVE YOU EVER DONE ANYTHING, STARTED TO DO ANYTHING, OR PREPARED TO DO ANYTHING TO END YOUR LIFE?: NO
2. HAVE YOU ACTUALLY HAD ANY THOUGHTS OF KILLING YOURSELF IN THE PAST MONTH?: NO

## 2024-07-05 NOTE — LETTER
July 6, 2024      To Whom It May Concern:      Laverne Hanson was seen in our Emergency Department today, 07/06/24.  I expect her condition to improve over the next 3-5 days.  She may return to work and perform activity as tolerated when improved.    Sincerely,        COURTNEY Cooper

## 2024-07-06 ENCOUNTER — APPOINTMENT (OUTPATIENT)
Dept: GENERAL RADIOLOGY | Facility: OTHER | Age: 44
End: 2024-07-06
Attending: PHYSICIAN ASSISTANT
Payer: OTHER MISCELLANEOUS

## 2024-07-06 ENCOUNTER — APPOINTMENT (OUTPATIENT)
Dept: CT IMAGING | Facility: OTHER | Age: 44
End: 2024-07-06
Attending: PHYSICIAN ASSISTANT
Payer: OTHER MISCELLANEOUS

## 2024-07-06 ENCOUNTER — HEALTH MAINTENANCE LETTER (OUTPATIENT)
Age: 44
End: 2024-07-06

## 2024-07-06 PROCEDURE — 72125 CT NECK SPINE W/O DYE: CPT | Mod: TC

## 2024-07-06 PROCEDURE — 72040 X-RAY EXAM NECK SPINE 2-3 VW: CPT | Mod: TC

## 2024-07-06 PROCEDURE — 250N000013 HC RX MED GY IP 250 OP 250 PS 637: Performed by: PHYSICIAN ASSISTANT

## 2024-07-06 PROCEDURE — 96372 THER/PROPH/DIAG INJ SC/IM: CPT | Performed by: PHYSICIAN ASSISTANT

## 2024-07-06 PROCEDURE — 73030 X-RAY EXAM OF SHOULDER: CPT | Mod: TC,RT

## 2024-07-06 PROCEDURE — 250N000011 HC RX IP 250 OP 636: Performed by: PHYSICIAN ASSISTANT

## 2024-07-06 PROCEDURE — 71111 X-RAY EXAM RIBS/CHEST4/> VWS: CPT | Mod: TC

## 2024-07-06 PROCEDURE — 72072 X-RAY EXAM THORAC SPINE 3VWS: CPT | Mod: TC

## 2024-07-06 RX ORDER — ACETAMINOPHEN 500 MG
1000 TABLET ORAL ONCE
Status: COMPLETED | OUTPATIENT
Start: 2024-07-06 | End: 2024-07-06

## 2024-07-06 RX ORDER — LIDOCAINE 4 G/G
1 PATCH TOPICAL ONCE
Status: DISCONTINUED | OUTPATIENT
Start: 2024-07-06 | End: 2024-07-06 | Stop reason: HOSPADM

## 2024-07-06 RX ORDER — KETOROLAC TROMETHAMINE 15 MG/ML
15 INJECTION, SOLUTION INTRAMUSCULAR; INTRAVENOUS ONCE
Status: COMPLETED | OUTPATIENT
Start: 2024-07-06 | End: 2024-07-06

## 2024-07-06 RX ADMIN — KETOROLAC TROMETHAMINE 15 MG: 15 INJECTION, SOLUTION INTRAMUSCULAR; INTRAVENOUS at 03:11

## 2024-07-06 RX ADMIN — ACETAMINOPHEN 1000 MG: 500 TABLET, FILM COATED ORAL at 00:54

## 2024-07-06 RX ADMIN — LIDOCAINE 1 PATCH: 4 PATCH TOPICAL at 03:13

## 2024-07-06 ASSESSMENT — ACTIVITIES OF DAILY LIVING (ADL)
ADLS_ACUITY_SCORE: 35

## 2024-07-06 NOTE — DISCHARGE INSTRUCTIONS
Get plenty of fluids and rest.  As discussed, the radiologist was unable to obtain adequate views on the neck x-ray and so we did obtain a CT of your neck.  Those results are still pending.  We did discuss that that there could be a worsening condition that we are unable to identify until those results are officially read.  However, it was decided to discharge at this time.  Wear your soft cervical collar is much as possible to help with discomfort.  Can alternate Tylenol and ibuprofen also ice and heat and use over-the-counter lidocaine patches.  I will keep your chart open and look for the imaging results later today and call you if there is a concerning finding on the CT results.  A referral was placed for you to follow-up with Workmen's Comp., Dr. Purcell.  Return the ED if there are worsening or concerning symptoms.
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
## 2024-07-10 ENCOUNTER — OFFICE VISIT (OUTPATIENT)
Dept: FAMILY MEDICINE | Facility: OTHER | Age: 44
End: 2024-07-10
Attending: CHIROPRACTOR
Payer: OTHER MISCELLANEOUS

## 2024-07-10 VITALS
DIASTOLIC BLOOD PRESSURE: 85 MMHG | HEART RATE: 94 BPM | RESPIRATION RATE: 17 BRPM | OXYGEN SATURATION: 97 % | WEIGHT: 270 LBS | BODY MASS INDEX: 44.98 KG/M2 | SYSTOLIC BLOOD PRESSURE: 131 MMHG | HEIGHT: 65 IN

## 2024-07-10 DIAGNOSIS — M54.2 NECK PAIN: ICD-10-CM

## 2024-07-10 DIAGNOSIS — F43.10 PTSD (POST-TRAUMATIC STRESS DISORDER): ICD-10-CM

## 2024-07-10 DIAGNOSIS — M54.6 ACUTE BILATERAL THORACIC BACK PAIN: ICD-10-CM
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
## 2024-07-10 PROCEDURE — 99213 OFFICE O/P EST LOW 20 MIN: CPT | Performed by: CHIROPRACTOR

## 2024-07-10 ASSESSMENT — PAIN SCALES - GENERAL: PAINLEVEL: MILD PAIN (2)

## 2024-07-10 ASSESSMENT — PATIENT HEALTH QUESTIONNAIRE - PHQ9
SUM OF ALL RESPONSES TO PHQ QUESTIONS 1-9: 6
10. IF YOU CHECKED OFF ANY PROBLEMS, HOW DIFFICULT HAVE THESE PROBLEMS MADE IT FOR YOU TO DO YOUR WORK, TAKE CARE OF THINGS AT HOME, OR GET ALONG WITH OTHER PEOPLE: NOT DIFFICULT AT ALL
SUM OF ALL RESPONSES TO PHQ QUESTIONS 1-9: 6
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
## 2024-07-11 ASSESSMENT — ENCOUNTER SYMPTOMS
FEVER: 0
SHORTNESS OF BREATH: 0
BACK PAIN: 1
CHILLS: 0
ABDOMINAL PAIN: 0
HEMATURIA: 0
COUGH: 0
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
## 2024-07-17 DIAGNOSIS — E53.8 B12 DEFICIENCY: ICD-10-CM

## 2024-07-17 RX ORDER — SYRINGE WITH NEEDLE, 1 ML 25GX5/8"
SYRINGE, EMPTY DISPOSABLE MISCELLANEOUS
Qty: 12 EACH | Refills: 2 | OUTPATIENT
Start: 2024-07-17

## 2024-07-17 NOTE — TELEPHONE ENCOUNTER
"[id number] sent Rx request for the following:      Requested Prescriptions   Pending Prescriptions Disp Refills    syringe/needle (disp) (B-D LUER-FAWAD SYRINGE) 25G X 1\" 3 ML MISC 12 each 2     Sig: INJECT 1 EACH INTO THE MUSCLE EVERY 30 DAYS.       There is no refill protocol information for this order          Last Prescription Date:   4/16/24  Last Fill Qty/Refills:         12, R-2    Last Office Visit:              7/30/19 (B12 discussed -  TJP)  Future Office visit:           None    Redundant refill request refused: Too soon:    Refused with note to pharmacy to transfer from [facility].     Vince Escudero, RN on 7/17/2024 at 2:36 PM    "

## 2024-07-22 ENCOUNTER — OFFICE VISIT (OUTPATIENT)
Dept: FAMILY MEDICINE | Facility: OTHER | Age: 44
End: 2024-07-22
Attending: CHIROPRACTOR
Payer: OTHER MISCELLANEOUS

## 2024-07-22 ENCOUNTER — TELEPHONE (OUTPATIENT)
Dept: FAMILY MEDICINE | Facility: OTHER | Age: 44
End: 2024-07-22
Payer: COMMERCIAL

## 2024-07-22 VITALS
WEIGHT: 271 LBS | SYSTOLIC BLOOD PRESSURE: 134 MMHG | HEIGHT: 69 IN | DIASTOLIC BLOOD PRESSURE: 88 MMHG | HEART RATE: 87 BPM | BODY MASS INDEX: 40.14 KG/M2 | OXYGEN SATURATION: 100 % | RESPIRATION RATE: 17 BRPM

## 2024-07-22 DIAGNOSIS — F43.10 PTSD (POST-TRAUMATIC STRESS DISORDER): ICD-10-CM
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
## 2024-07-22 DIAGNOSIS — M54.6 ACUTE BILATERAL THORACIC BACK PAIN: ICD-10-CM

## 2024-07-22 DIAGNOSIS — M54.2 NECK PAIN: ICD-10-CM

## 2024-07-22 PROCEDURE — 99213 OFFICE O/P EST LOW 20 MIN: CPT | Performed by: CHIROPRACTOR

## 2024-07-22 ASSESSMENT — PAIN SCALES - GENERAL: PAINLEVEL: MILD PAIN (2)

## 2024-07-22 NOTE — TELEPHONE ENCOUNTER
Spoke with patient and offered her 1pm appointment time today, patient accepted and was informed that scheduling will change her appointment from 2:20 today to 1 pm.
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
## 2024-08-01 ENCOUNTER — OFFICE VISIT (OUTPATIENT)
Dept: PSYCHIATRY | Facility: OTHER | Age: 44
End: 2024-08-01
Attending: NURSE PRACTITIONER
Payer: OTHER MISCELLANEOUS

## 2024-08-01 VITALS
WEIGHT: 271.7 LBS | DIASTOLIC BLOOD PRESSURE: 86 MMHG | OXYGEN SATURATION: 98 % | SYSTOLIC BLOOD PRESSURE: 121 MMHG | HEART RATE: 80 BPM | RESPIRATION RATE: 16 BRPM | HEIGHT: 65 IN | TEMPERATURE: 97.3 F | BODY MASS INDEX: 45.27 KG/M2

## 2024-08-01 DIAGNOSIS — F43.11 ACUTE POSTTRAUMATIC STRESS DISORDER: ICD-10-CM
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
## 2024-08-01 DIAGNOSIS — F33.1 MAJOR DEPRESSIVE DISORDER, RECURRENT EPISODE, MODERATE (H): ICD-10-CM

## 2024-08-01 PROCEDURE — G2211 COMPLEX E/M VISIT ADD ON: HCPCS | Performed by: NURSE PRACTITIONER

## 2024-08-01 PROCEDURE — 99205 OFFICE O/P NEW HI 60 MIN: CPT | Performed by: NURSE PRACTITIONER

## 2024-08-01 RX ORDER — LAMOTRIGINE 25 MG/1
TABLET ORAL
Qty: 42 TABLET | Refills: 0 | Status: SHIPPED | OUTPATIENT
Start: 2024-08-01

## 2024-08-01 ASSESSMENT — ANXIETY QUESTIONNAIRES
5. BEING SO RESTLESS THAT IT IS HARD TO SIT STILL: NOT AT ALL
GAD7 TOTAL SCORE: 10
4. TROUBLE RELAXING: SEVERAL DAYS
7. FEELING AFRAID AS IF SOMETHING AWFUL MIGHT HAPPEN: SEVERAL DAYS
6. BECOMING EASILY ANNOYED OR IRRITABLE: MORE THAN HALF THE DAYS
2. NOT BEING ABLE TO STOP OR CONTROL WORRYING: MORE THAN HALF THE DAYS
GAD7 TOTAL SCORE: 10
7. FEELING AFRAID AS IF SOMETHING AWFUL MIGHT HAPPEN: SEVERAL DAYS
IF YOU CHECKED OFF ANY PROBLEMS ON THIS QUESTIONNAIRE, HOW DIFFICULT HAVE THESE PROBLEMS MADE IT FOR YOU TO DO YOUR WORK, TAKE CARE OF THINGS AT HOME, OR GET ALONG WITH OTHER PEOPLE: SOMEWHAT DIFFICULT
8. IF YOU CHECKED OFF ANY PROBLEMS, HOW DIFFICULT HAVE THESE MADE IT FOR YOU TO DO YOUR WORK, TAKE CARE OF THINGS AT HOME, OR GET ALONG WITH OTHER PEOPLE?: SOMEWHAT DIFFICULT
GAD7 TOTAL SCORE: 10
1. FEELING NERVOUS, ANXIOUS, OR ON EDGE: MORE THAN HALF THE DAYS
3. WORRYING TOO MUCH ABOUT DIFFERENT THINGS: MORE THAN HALF THE DAYS

## 2024-08-01 ASSESSMENT — PATIENT HEALTH QUESTIONNAIRE - PHQ9
10. IF YOU CHECKED OFF ANY PROBLEMS, HOW DIFFICULT HAVE THESE PROBLEMS MADE IT FOR YOU TO DO YOUR WORK, TAKE CARE OF THINGS AT HOME, OR GET ALONG WITH OTHER PEOPLE: SOMEWHAT DIFFICULT
SUM OF ALL RESPONSES TO PHQ QUESTIONS 1-9: 9
SUM OF ALL RESPONSES TO PHQ QUESTIONS 1-9: 9

## 2024-08-01 ASSESSMENT — PAIN SCALES - GENERAL: PAINLEVEL: MILD PAIN (2)
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
## 2024-08-01 NOTE — PATIENT INSTRUCTIONS
"Laverne, it was a pleasure seeing you today. As we discussed, we are going to start a trial of lamictal to help with sleep, mood and anxiety.        Call the clinic with medication questions or concerns at [phone] or send a PatientsLikeMehart message. PatientsLikeMehart may be used to communicate with your provider, but this is not intended to be used for emergencies.     Crisis Resources for [facility]: First Call for Help: (211), H.O.P.E. Crisis Line available 24/7: ([phone])  [facility]: National Crisis Text Line: text HOME to 962995    Crisis Resources for [place]: Adult Mental Health crisis: 218-288-2100, [place] Crisis Text Line: text \"[place]\" or \"HOME\" to 217501    Therapy Options in [place], [place] and Surrounding Area:    Elver Latham, [facility] - ([phone])    Psychological Services - Alfredo Burden ([phone])    Jaimee Castro, [facility], family and individual therapy- ([phone])    [facility] - specializes in women and adolescent therapy - ([phone])    [facility] - EMDR, trauma, etc. ([phone])    [facility] - spiritual based support groups ([phone])    Frankie Rehman - adults, adolescents and children ([phone])    [facility] - several different therapy options adults and children ([phone])    [facility] - several options, one of the largest mental health providers in the area - ([phone])    [facility] - ([phone])    [facility] - offers several therapy options including 8-week \"express\" therapy program - ([phone])    [facility] - ([phone])    [facility] - adult therapy ([phone])    [facility] - ([phone])    [facility] - ([phone])    [facility] - ([phone])    [facility] - ([phone])    [facility], " "[place] - ([phone])    [facility], [place] - [phone]    [facility], [place] - [phone]    [facility], [facility] [phone]    Food & Mood - sugar cravings, weight gain, and binge eating are highly prevalent with depression. The reasons for this are correlated with gut health, cortisol and blood sugar production. When depressed, our body craves increased serotonin, which is readily available when we eat simple carbs (sugary foods), which increases insulin and allows amino acids like Tryptophan (that thing in turkey and milk that makes you tired) to enter the brain. Tryptophan helps our body produce serotonin. Because this is such an immediate response, we naturally turn toward these foods instead of high protein or high fiber foods, which also leads to blood sugar dysregulation. High protein and high fiber foods help maintain blood sugar and prevent \"crashes.\" Because of this dysregulation, sleep disruption also accompanies this vicious cycle. AND candida, found in the gut, can cause further dysregulation as it feeds on sugar, so if we have gut issues, we will also crave sugar. Gut issues and mood disorders go hand in hand 99% of the time. So, one goal should be to improve gut health, which can improve mood, sleep, and overall health.     Ways to do this include -    1. Mindful eating - this may sound excessive, but you should chew every bite 20-30 times before swallowing. Doing so makes digestion so much easier on your body. Not doing so can cause bloat, abdominal discomfort, distention, AND our body is not able to absorb the nutrients from the food.     2. Bitters - consider adding dandelion leaves and arugula to your meals or salad, OR you can choose a tea with dandelion leaf and roots. [employer] sells one on [employer], or you can purchase [place] Detox with dandelion, nettle and grapefruit at most grocery stores. Bitters help stimulate effective " digestion either before or during meals. The detox part is real and highly recommended by many nutritionists right now given the amount of toxins we are exposed to daily.    3. Avoid consumption of water and fluids 30 minutes prior to eating and up to two hours after. Fluids dilute the acids that aid in digestion, which often contributes to issues like GERD, indigestion, and bloating - this is a hard one for many who have lifelong habits of consuming fluids while we eat.          Foods are medicine:    Foods for anxiety   Green Leafy Veggies = magnesium, calms neurotransmitters in the brain = [place], and decreases inflammation. Contain Iron. Low Iron levels can cause increased anxiety.     Flax Seeds = Omega 3 Fatty Acids, which are crucial for brain health, decrease inflammation in the brain, and (for women) contain phytoestrogens, which help if you are estrogen dominant (this can cause PMS, increased anxiety a week prior to menstruation, and painful periods).    Turkey = increased protein, which helps balance blood sugar throughout the day, and Tryptophan = serotonin.   Foods for Stress Relief  So, what foods reduce stress and anxiety? A psychobiotic diet is high in fiber and prebiotic foods, which also tend to be higher in vitamins, minerals, and other healthy nutrients.  This diet involves consuming primarily:  Whole grains - i.e., oats, barley, and quinoa  Prebiotic fruits - i.e., apples, bananas, and berries  Prebiotic vegetables - i.e., onions, leeks, and cabbage  Fermented foods - i.e., sauerkraut, kefir, and Kombucha  Legumes - i.e., beans, chickpeas, and peanuts  Incorporating foods from each category into your diet may help you feel less stress and anxiety. You might eat oatmeal with apple chunks for breakfast, for instance, and a salad with chickpeas and onions at lunch. Both of these meals would fit into a psychobiotic eating plan.  Stress Foods to Avoid  Just as it's important to know what foods you can  "eat to reduce stress, it's equally essential to know which ones to avoid due to their ability to increase the amount of stress you feel.  The psychobiotic diet in the study was low in processed foods and discouraged the consumption of fast food and sugary drinks.  Additional foods that cause stress and anxiety to increase include: (4)  Refined carbs - white bread and pasta  Foods with added sugars - cookies, cakes, and other desserts  Those that contain caffeine - coffee, tea, and chocolate    Easy lifestyle changes to improve overall well-being:     Hydration - Dehydration can cause irritability, headaches, brain fog, and binge eating.   If you dislike water, add flavor to increase compliance - lemon, citrus, fruit, cucumber, True Lemon/Lime packets. Individual amounts and needs vary, but target 2-3 L per day, 72-100g.    Gut health -   Bone broth daily - Bone broth contains collagen and L-glutamine, both heal leaky gut.    Fermented foods - [place], miso, kimche, yogurt, sauerkraut, probiotics = healthy gut bacteria = healthy mood.    Legumes/Lentils/Beans -  Preobiotics are a form of fiber which are food for good gut bacteria. Prebiotics feed Probiotics.     Hi-protein, hi-fat breakfasts - Skipping meals decreases blood sugar which increases ADHD symptoms. Breakfast is the worst meal to skip.     Eat earlier - Eating too late in the day can cause sleep disruption. Avoid heavy meals after 6pm, or 3-4 hours before your normal bedtime.         Easy Lifestyle changes to improve sleep balance:     No caffeine after 2-3 pm, even if you think it doesn't disrupt your sleep, the half life of caffeine is 8 hours, so the metabolization of this alone can cause restless sleep. It's worse if there's no food in your stomach.    Keep your sleep area less than 65 degrees - the body falls into deeper sleep when it's cooler, sort of like \"hibernation,\" which is why we are more tired in the colder months.     Zinc supplement - zinc is " necessary for melatonin production. Foods with zinc include most meats, legumes, nuts/peanuts, seeds, shellfish, dark chocolate, dairy, eggs, and whole grains.    Iron - Iron deficiency can cause dysregulation of dopamine in the brain, which is linked to depression, anxiety, MIGRAINES, INSOMNIA, RLS, sleep apnea, and ADHD.     Exercise - avoid heavy exercise prior to bed. Exercise is great for promoting sleep, but not too close to bed. Aim for at least two hours before.        Waking up exhausted -     AM light - 10-15 minutes of light in the morning will help with cortisol metabolism (stress hormone) = better ability to cope with stressors, improved sleep at night.     Mindfulness Meditation - 2-5 minutes per day helps rewire the brain and helps cortisol levels.     Magnesium Glycinate or Theonate - Supports adrenal function, which regulates cortisol levels and improves sleep and brain fog.       Other weird things:  10-15 minutes of outside light in the morning can help cortisol metabolism - it doesn't even have to be sunlight - cloudy light, whatever. This helps with stress and sleep.     Mindful meditation - Just 2-5 minutes per day can help rewire your brain and help cortisol levels!    Magnesium glycinate or threonate supports adrenal function, helps balance cortisol, improve sleep and clear brain fog.     Dark chocolate - has magnesium (you get the trend with this), small amount of caffeine buffered by healthy fats to decrease absorption and improve focus, balances HPA axis (hypothalamus pituitary/adrenal), which improves cortisol balance and metabolism, and works as a pre-biotic - food for your probiotic and improve gut gt.     Hydration - dehydration can cause irritability, brain fog and binge eating.    Bone broth - collagen and glutamine can heal leaky gut, which can contribute to poor health, mood issues, anxiety, sleep disruption, migraines, brain fog and fatigue. Daily consumption - just a cup or  two - can be very supportive to gut health.    Fermented foods - tempe, miso, kimches, yogurt, sauerkraut, kombucha - all good nutritional probiotic sources = healthy gut = better mood, sleep, etc.    Legumes/lentils and beans (kidney, black, navy, etc) - pre-biotics and fiber, good for gut bacteria, food for probiotics, fiber decreases sugar cravings related to crashes and cortisol imbalance.     Eating a high protein high fat breakfast is key. Avoid high carb lunches to decrease mid-day crash. Eating too late can disrupt sleep - this is not a myth.     Supplements (Always use with caution, after consulting with your physician and according to package instructions:    Rhodiola - a stimulating adaptogen may improve motivation; however, need to be careful with high anxiety or possibility of sheldon.     Ashwagandha - a calming adaptogen that has many benefits, including sleep, stress relief, increased energy/motivation, increased libido, reduction in anxiety and depression, helps normalize cortisol levels (this is huge) and thyroid hormones, it has antimicrobial properties and may decrease candida in the gut and elsewhere (think yeast), possibly aids in weight reduction, and helps build muscle mass. Things to watch for - too much can upset the stomach, cause drowsiness, and there have been reports of liver toxicity. Interestingly, there are also studies supporting liver repair with use. There's a lot of information regarding the reasonings for this relating to Withanone a metabolite of ashwagandha and appears to be stan-related.     Saffron - improvement in mood, libido, sexual function, immunities, anti-inflammatory, and some evidence of enhanced weight loss support. High in antioxidants. There are supplements and it can be added to food. Some people drink saffron infused water which is supposed to be good for skin, too. Has been studied at 100mg daily up to 26 weeks in supplemental form and found to be safe. Some s/e  can include GI upset and drowsiness at high doses.   The important thing to remember with supplements, is that although typically safe, we all have individual factors that may increase risk. It's important to always pay attention to what your body is telling you and stick to dosage recommendations.       When considering any significant lifestyle changes, addition of exercise, or introduction of supplements, be sure to consult with your medical provider, especially if you have any medical conditions or you are pregnant. Supplements are not regulated by the FDA, and even though they are natural, many have side effects, some of which can be serious when used in combination, used with prescription medications or used when there are certain medical conditions present, such as liver and/or kidney disorders and insufficiencies, seizure disorders, or even sometimes mood and psychiatric disorders. All supplements should be used according to package instructions and personal exploration into brands to ensure they are reputable, is essential to maintaining your well-being. Even though there may be benefits and healing properties to many supplements, it is very important to listen to your body when you start anything new. Remember, natural is not always better.     Please reach out to your provider if you have any questions.

## 2024-08-07 ENCOUNTER — OFFICE VISIT (OUTPATIENT)
Dept: FAMILY MEDICINE | Facility: OTHER | Age: 44
End: 2024-08-07
Attending: CHIROPRACTOR
Payer: OTHER MISCELLANEOUS

## 2024-08-07 VITALS
HEART RATE: 96 BPM | BODY MASS INDEX: 44.98 KG/M2 | SYSTOLIC BLOOD PRESSURE: 134 MMHG | DIASTOLIC BLOOD PRESSURE: 89 MMHG | OXYGEN SATURATION: 98 % | HEIGHT: 65 IN | WEIGHT: 270 LBS | RESPIRATION RATE: 16 BRPM

## 2024-08-07 DIAGNOSIS — M54.50 ACUTE BILATERAL LOW BACK PAIN WITHOUT SCIATICA: ICD-10-CM

## 2024-08-07 DIAGNOSIS — M54.2 NECK PAIN: ICD-10-CM

## 2024-08-07 DIAGNOSIS — F43.10 PTSD (POST-TRAUMATIC STRESS DISORDER): ICD-10-CM
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
## 2024-08-07 DIAGNOSIS — M54.6 ACUTE BILATERAL THORACIC BACK PAIN: ICD-10-CM

## 2024-08-07 PROCEDURE — 99213 OFFICE O/P EST LOW 20 MIN: CPT | Performed by: CHIROPRACTOR

## 2024-08-07 ASSESSMENT — PAIN SCALES - GENERAL: PAINLEVEL: MILD PAIN (2)

## 2024-08-07 NOTE — PROGRESS NOTES
CHIEF COMPLAINT:   Chief Complaint   Patient presents with    Work Comp       HISTORY OF PRESENTING INJURY     Since my last encounter with Laverne, she has been evaluated by our psych team and started on a new medication.  Her employer also offered her a position at a different location and with non-aggressive clients.  Laverne states she is ready to return to work with these new changes.  Her lower back continues to be painful and she is set to start physical therapy in approximately two weeks.  No other noted changes today.    Oswestry (PALAK) Questionnaire        2024     1:00 PM   OSWESTRY DISABILITY INDEX   Count 9   Sum 8   Oswestry Score (%) 17.78 %        PAST MEDICAL HISTORY:  Past Medical History:   Diagnosis Date    Cannabis abuse, uncomplicated     use found on urine drug screen    Encounter for general adult medical examination without abnormal findings     No Comments Provided    Essential (primary) hypertension         Gastro-esophageal reflux disease without esophagitis     No Comments Provided    Low back pain     No Comments Provided    Morbid (severe) obesity due to excess calories (H)     07,with a body mass index of 51    Other specified anxiety disorders     No Comments Provided    Otitis externa of both ears     Recurrent    Personal history of other medical treatment (CODE)      I, para 1.    Tobacco use     No Comments Provided       PAST SURGICAL HISTORY:  Past Surgical History:   Procedure Laterality Date    ADENOIDECTOMY          ANKLE SURGERY          COLONOSCOPY  2014    hyperplastic, 10 year follow up    LAPAROSCOPIC TUBAL LIGATION          OTHER SURGICAL HISTORY      10/86,44447.0,GA CREATE EARDRUM OPENING GEN ANESTH    OTHER SURGICAL HISTORY      ,44848.0,GA CREATE EARDRUM OPENING GEN ANESTH    OTHER SURGICAL HISTORY      ,673146,OTHER, with left PE tube    OTHER SURGICAL HISTORY      ,494957,OTHER    OTHER SURGICAL HISTORY       05/22/07,834780,OTHER, for obesity, [place]       ALLERGIES:  Allergies   Allergen Reactions    Amoxicillin Other (See Comments)     Pt reports it is ineffective for her personally       CURRENT MEDICATIONS:  Current Outpatient Medications   Medication Sig Dispense Refill    B Complex-C-E-Zn (STRESS B-COMPLEX/VIT C/ZINC) TABS TAKE 1 TABLET BY MOUTH EVERY MORNING 90 tablet 2    buPROPion (WELLBUTRIN XL) 300 MG 24 hr tablet Take 1 tablet by mouth daily at 2 pm      Cholecalciferol (VITAMIN D3) 2000 units CAPS Take 1 tablet by mouth daily 30 capsule 11    cyanocobalamin (CYANOCOBALAMIN) 1000 MCG/ML injection INJECT 1 ML INTRAMUSCULARLY  ONCE EVERY MONTH 3 mL 0    levothyroxine (SYNTHROID/LEVOTHROID) 175 MCG tablet Take 1 tablet (175 mcg) by mouth daily at 2 pm 30 tablet 0    LORazepam (ATIVAN) 1 MG tablet Take 1 tablet (1 mg) by mouth 2 times daily as needed for anxiety 30 tablet 0    Multiple Vitamin (MULTI-VITAMINS) TABS Take 1 tablet by mouth      cyclobenzaprine (FLEXERIL) 10 MG tablet Take 1 tablet by mouth 3 times daily (Patient not taking: Reported on 8/1/2024)      cyclobenzaprine (FLEXERIL) 5 MG tablet Take 1 tablet (5 mg) by mouth 2 times daily as needed for muscle spasms (Patient not taking: Reported on 8/1/2024) 20 tablet 0    gabapentin (NEURONTIN) 300 MG capsule Take 1 capsule (300 mg) by mouth 3 times daily (Patient not taking: Reported on 8/1/2024) 270 capsule 4    lamoTRIgine (LAMICTAL) 25 MG tablet Take 1 tablet by mouth at bed x 14 days; then increase to 2 tablets by mouth at bed. (Patient not taking: Reported on 8/7/2024) 42 tablet 0    levothyroxine (SYNTHROID/LEVOTHROID) 137 MCG tablet Take 1 tablet by mouth once daily (Patient not taking: Reported on 8/1/2024) 90 tablet 0    lisinopril-hydrochlorothiazide (ZESTORETIC) 20-12.5 MG tablet Take 2 tablets by mouth once daily (Patient not taking: Reported on 8/1/2024) 180 tablet 4    predniSONE (DELTASONE) 20 MG tablet Take 3 tabs by  "mouth daily x 3 days, then 2 tabs daily x 3 days, then 1 tab daily x 3 days, then 1/2 tab daily x 3 days. (Patient not taking: Reported on 8/1/2024) 20 tablet 0    syringe/needle, disp, (B-D LUER-FAWAD SYRINGE) 25G X 1\" 3 ML MISC INJECT 1 EACH INTO THE MUSCLE EVERY 30 DAYS. (Patient not taking: Reported on 8/1/2024) 12 each 2    traMADol (ULTRAM) 50 MG tablet Take 1 tablet (50 mg) by mouth every 6 hours as needed for severe pain (Patient not taking: Reported on 8/1/2024) 120 tablet 0    vitamin B complex with vitamin C (VITAMIN  B COMPLEX) tablet Take 1 tablet by mouth daily (Patient not taking: Reported on 8/1/2024)         SOCIAL HISTORY:  Social History     Socioeconomic History    Marital status:      Spouse name: Not on file    Number of children: Not on file    Years of education: Not on file    Highest education level: Not on file   Occupational History    Not on file   Tobacco Use    Smoking status: Former     Current packs/day: 0.25     Types: Cigarettes    Smokeless tobacco: Never   Vaping Use    Vaping status: Never Used   Substance and Sexual Activity    Alcohol use: Yes     Comment: Occassionally    Drug use: Yes     Types: Marijuana    Sexual activity: Yes     Partners: Male     Birth control/protection: Surgical   Other Topics Concern    Parent/sibling w/ CABG, MI or angioplasty before 65F 55M? 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       to Lenny, 2010    1 Daughter, Anjali, 2/12/2005    1 Step daughter     Social Determinants of Health     Financial Resource Strain: Not on file   Food Insecurity: Low Risk  (8/1/2024)    Food Insecurity     Within the past 12 months, did you worry that your food would run out before you got money to buy more?: No     Within the past 12 months, did the food you bought just not last and you didn t have money to get more?: No   Transportation Needs: Not on file   Physical Activity: Not on file   Stress: Not on " "file   Social Connections: Not on file   Interpersonal Safety: Low Risk  (8/1/2024)    Interpersonal Safety     Do you feel physically and emotionally safe where you currently live?: Yes     Within the past 12 months, have you been hit, slapped, kicked or otherwise physically hurt by someone?: No     Within the past 12 months, have you been humiliated or emotionally abused in other ways by your partner or ex-partner?: No   Housing Stability: Not on file       FAMILY HISTORY:  Family History   Problem Relation Age of Onset    Hypertension Mother         Hypertension    Other - See Comments Mother         History of depression    Hypertension Brother         Hypertension       REVIEW OF SYSTEMS:    Unremarkable other than chief complaint      PHYSICAL EXAM:   /89   Pulse 96   Resp 16   Ht 1.651 m (5' 5\")   Wt 122.5 kg (270 lb)   SpO2 98%   BMI 44.93 kg/m   Body mass index is 44.93 kg/m .  General Appearance: No acute distress.  Mood is improved.        IMPRESSION/PLAN:    Continue with psych recommendations and we will be sure to include LBP in her PT treatment.  With regards to work, I issued RTW without restrictions with instructions to contact us if any worsening or triggering event may happen.  I explained my absence from the clinic occurring next week, but we do have providers for coverage.  She is agreeable and new workability issued, two copies again given.  Next appointment will be at conclusion of PT treatment, on or about 9/16/2024.    Total time spent today in chart review/preparation, face to face evaluation, and documentation: 20 minutes.      Brian Almazan DC, CLARISSA, QUAN  Director - Occupational Medicine Department  Diplomate of the American Board of Forensic Professionals  Board Certified - American Board of Independent Medical Examiners      2:06 PM 8/7/2024    "

## 2024-08-21 ENCOUNTER — THERAPY VISIT (OUTPATIENT)
Dept: PHYSICAL THERAPY | Facility: OTHER | Age: 44
End: 2024-08-21
Attending: CHIROPRACTOR
Payer: OTHER MISCELLANEOUS

## 2024-08-21 DIAGNOSIS — M54.6 ACUTE BILATERAL THORACIC BACK PAIN: ICD-10-CM

## 2024-08-21 DIAGNOSIS — M54.2 NECK PAIN: Primary | ICD-10-CM

## 2024-08-21 PROCEDURE — 97110 THERAPEUTIC EXERCISES: CPT | Mod: GP | Performed by: PHYSICAL THERAPIST

## 2024-08-21 PROCEDURE — 97162 PT EVAL MOD COMPLEX 30 MIN: CPT | Mod: GP | Performed by: PHYSICAL THERAPIST
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
## 2024-08-22 ENCOUNTER — OFFICE VISIT (OUTPATIENT)
Dept: PSYCHIATRY | Facility: OTHER | Age: 44
End: 2024-08-22
Attending: NURSE PRACTITIONER
Payer: OTHER MISCELLANEOUS

## 2024-08-22 VITALS
TEMPERATURE: 98.3 F | HEART RATE: 71 BPM | SYSTOLIC BLOOD PRESSURE: 139 MMHG | DIASTOLIC BLOOD PRESSURE: 91 MMHG | WEIGHT: 274.8 LBS | OXYGEN SATURATION: 100 % | BODY MASS INDEX: 45.73 KG/M2 | RESPIRATION RATE: 16 BRPM

## 2024-08-22 DIAGNOSIS — R79.89 ABNORMAL TSH: ICD-10-CM

## 2024-08-22 DIAGNOSIS — F33.1 MAJOR DEPRESSIVE DISORDER, RECURRENT EPISODE, MODERATE (H): Primary | ICD-10-CM

## 2024-08-22 DIAGNOSIS — F43.11 ACUTE POST-TRAUMATIC STRESS DISORDER: ICD-10-CM

## 2024-08-22 PROBLEM — M54.2 NECK PAIN: Status: ACTIVE | Noted: 2024-08-22

## 2024-08-22 PROBLEM — M54.6 ACUTE BILATERAL THORACIC BACK PAIN: Status: ACTIVE | Noted: 2024-08-22

## 2024-08-22 PROCEDURE — 99213 OFFICE O/P EST LOW 20 MIN: CPT | Performed by: NURSE PRACTITIONER

## 2024-08-22 PROCEDURE — G2211 COMPLEX E/M VISIT ADD ON: HCPCS | Performed by: NURSE PRACTITIONER

## 2024-08-22 RX ORDER — LAMOTRIGINE 25 MG/1
75 TABLET ORAL DAILY
Qty: 42 TABLET | Refills: 0 | Status: SHIPPED | OUTPATIENT
Start: 2024-08-22 | End: 2024-09-05

## 2024-08-22 RX ORDER — LAMOTRIGINE 100 MG/1
100 TABLET ORAL AT BEDTIME
Qty: 30 TABLET | Refills: 1 | Status: SHIPPED | OUTPATIENT
Start: 2024-08-22

## 2024-08-22 ASSESSMENT — PAIN SCALES - GENERAL: PAINLEVEL: NO PAIN (0)

## 2024-08-22 ASSESSMENT — ANXIETY QUESTIONNAIRES
IF YOU CHECKED OFF ANY PROBLEMS ON THIS QUESTIONNAIRE, HOW DIFFICULT HAVE THESE PROBLEMS MADE IT FOR YOU TO DO YOUR WORK, TAKE CARE OF THINGS AT HOME, OR GET ALONG WITH OTHER PEOPLE: SOMEWHAT DIFFICULT
GAD7 TOTAL SCORE: 10
GAD7 TOTAL SCORE: 10
8. IF YOU CHECKED OFF ANY PROBLEMS, HOW DIFFICULT HAVE THESE MADE IT FOR YOU TO DO YOUR WORK, TAKE CARE OF THINGS AT HOME, OR GET ALONG WITH OTHER PEOPLE?: SOMEWHAT DIFFICULT
5. BEING SO RESTLESS THAT IT IS HARD TO SIT STILL: NOT AT ALL
2. NOT BEING ABLE TO STOP OR CONTROL WORRYING: MORE THAN HALF THE DAYS
3. WORRYING TOO MUCH ABOUT DIFFERENT THINGS: MORE THAN HALF THE DAYS
6. BECOMING EASILY ANNOYED OR IRRITABLE: MORE THAN HALF THE DAYS
7. FEELING AFRAID AS IF SOMETHING AWFUL MIGHT HAPPEN: SEVERAL DAYS
4. TROUBLE RELAXING: SEVERAL DAYS
GAD7 TOTAL SCORE: 10
7. FEELING AFRAID AS IF SOMETHING AWFUL MIGHT HAPPEN: SEVERAL DAYS
1. FEELING NERVOUS, ANXIOUS, OR ON EDGE: MORE THAN HALF THE DAYS

## 2024-08-22 ASSESSMENT — PATIENT HEALTH QUESTIONNAIRE - PHQ9
SUM OF ALL RESPONSES TO PHQ QUESTIONS 1-9: 6
10. IF YOU CHECKED OFF ANY PROBLEMS, HOW DIFFICULT HAVE THESE PROBLEMS MADE IT FOR YOU TO DO YOUR WORK, TAKE CARE OF THINGS AT HOME, OR GET ALONG WITH OTHER PEOPLE: SOMEWHAT DIFFICULT
SUM OF ALL RESPONSES TO PHQ QUESTIONS 1-9: 6

## 2024-08-22 NOTE — PATIENT INSTRUCTIONS
"Laverne, it was a pleasure seeing you today. As we discussed, on 8/28, you can increase Lamictal to 75mg at bed. Two weeks later you will increase to 100mg at bed. Orders to support both changes were sent to the pharmacy.        Call the clinic with medication questions or concerns at [phone] or send a Inflection message. MyChart may be used to communicate with your provider, but this is not intended to be used for emergencies.     Crisis Resources for [facility]: First Call for Help: (211), [facility] Crisis Line available 24/7: ([phone])  [place] [facility]: National Crisis Text Line: text HOME to 128922    Crisis Resources for [place]: Adult Mental Health crisis: [phone], [place] Crisis Text Line: text \"[place]\" or \"HOME\" to 200256    Therapy Options in [place], [place] and Surrounding Area:    Elver Latham, [facility] - ([phone])    Psychological Services - Alfredo Burden ([phone])    Jaimee Castro, [facility], family and individual therapy- ([phone])    [facility] - specializes in women and adolescent therapy - ([phone])    [facility] - EMDR, trauma, etc. ([phone])    [facility] [place] [facility] - spiritual based support groups ([phone])    Frankie Rehman - adults, adolescents and children ([phone])    [facility] - several different therapy options adults and children ([phone])    [facility] - several options, one of the largest mental health providers in the area - ([phone])    [facility] - ([phone])    [facility] - offers several therapy options including 8-week \"express\" therapy program - ([phone])    [facility] - ([phone])    [facility] - adult therapy ([phone])    [facility] - ([phone])    [facility] - ([phone])    [facility] - ([phone])    [facility], [place] - ([phone])    [facility], [place] - ([phone])    [facility], [place] - [phone]    [facility], [facility] [phone]    [facility], [place]  [phone]

## 2024-08-22 NOTE — NURSING NOTE
"Chief Complaint   Patient presents with    Medication Follow-up       Initial There were no vitals taken for this visit. Estimated body mass index is 44.93 kg/m  as calculated from the following:    Height as of 8/7/24: 1.651 m (5' 5\").    Weight as of 8/7/24: 122.5 kg (270 lb).  Medication Review: complete    The next two questions are to help us understand your food security.  If you are feeling you need any assistance in this area, we have resources available to support you today.          8/1/2024   SDOH- Food Insecurity   Within the past 12 months, did you worry that your food would run out before you got money to buy more? N   Within the past 12 months, did the food you bought just not last and you didn t have money to get more? N            Health Care Directive:  Patient does not have a Health Care Directive or Living Will: Discussed advance care planning with patient; however, patient declined at this time.    Josselyn Barr CNA      "
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
## 2024-08-26 RX ORDER — LEVOTHYROXINE SODIUM 175 UG/1
175 TABLET ORAL
Qty: 30 TABLET | Refills: 0 | Status: SHIPPED | OUTPATIENT
Start: 2024-08-26 | End: 2024-09-30

## 2024-08-26 NOTE — TELEPHONE ENCOUNTER
[id number] sent Rx request for the following:      Requested Prescriptions   Pending Prescriptions Disp Refills    levothyroxine (SYNTHROID/LEVOTHROID) 175 MCG tablet 30 tablet 0     Sig: Take 1 tablet (175 mcg) by mouth daily at 2 pm.       Thyroid Protocol Failed - 8/26/2024  3:56 PM        Failed - Medication indicated for associated diagnosis     Medication is associated with one or more of the following diagnoses:  Hypothyroidism  Thyroid stimulating hormone suppression therapy  Thyroid cancer  Acquired atrophy of thyroid          Passed - Patient is 12 years or older        Passed - Recent (12 mo) or future (30 days) visit within the authorizing provider's specialty     The patient must have completed an in-person or virtual visit within the past 12 months or has a future visit scheduled within the next 90 days with the authorizing provider s specialty.  Urgent care and e-visits do not quality as an office visit for this protocol.          Passed - Medication is active on med list        Passed - Normal TSH on file in past 12 months     Recent Labs   Lab Test 06/11/24  1841   TSH 1.66              Passed - No active pregnancy on record     If patient is pregnant or has had a positive pregnancy test, please check TSH.          Passed - No positive pregnancy test in past 12 months     If patient is pregnant or has had a positive pregnancy test, please check TSH.               Last Prescription Date:   6/3/24  Last Fill Qty/Refills:         30, R-0    Last Office Visit:              3/3/22 (discussed)  Future Office visit:           9/19/24     Unable to complete prescription refill per RN Medication Refill Policy.     Vince Escudero RN on 8/26/2024 at 3:58 PM

## 2024-08-27 ENCOUNTER — MYC MEDICAL ADVICE (OUTPATIENT)
Dept: FAMILY MEDICINE | Facility: OTHER | Age: 44
End: 2024-08-27
Payer: COMMERCIAL

## 2024-08-27 DIAGNOSIS — G56.92 NEUROPATHY, ARM, LEFT: ICD-10-CM

## 2024-08-27 NOTE — TELEPHONE ENCOUNTER
I'm assuming you are still recommending PT- not chiro?    Options:  She could wait for October for preferred therapist or check with other local PT's for female therapist availability?     Adalgisa Hamilton RN on 8/27/2024 at 1:39 PM

## 2024-08-28 NOTE — TELEPHONE ENCOUNTER
[id number] of [place] sent Rx request for the following:      Requested Prescriptions   Pending Prescriptions Disp Refills    gabapentin (NEURONTIN) 100 MG capsule 270 capsule 3     Sig: Take 1 capsule (100 mg) by mouth 3 times daily.       There is no refill protocol information for this order          Last Prescription Date:   Discontinued 4/20/22  Last Fill Qty/Refills:         270, R-3    Last Office Visit:              3/3/22   Future Office visit:           None    [facility] Y[facility] NP. Will route to PCP for review.    Kylie Richards RN on 8/28/2024 at 2:12 PM

## 2024-08-29 RX ORDER — GABAPENTIN 100 MG/1
100 CAPSULE ORAL 3 TIMES DAILY
Qty: 270 CAPSULE | Refills: 3 | Status: SHIPPED | OUTPATIENT
Start: 2024-08-29

## 2024-09-03 NOTE — TELEPHONE ENCOUNTER
Miguel PT order to [facility] at [facility] at fax [phone].      Patient update on MyChart.    Adalgisa Hamilton RN on 9/3/2024 at 9:54 AM

## 2024-09-30 DIAGNOSIS — R79.89 ABNORMAL TSH: ICD-10-CM

## 2024-10-02 NOTE — TELEPHONE ENCOUNTER
[facility] sent Rx request for the following:      Requested Prescriptions   Pending Prescriptions Disp Refills    levothyroxine (SYNTHROID/LEVOTHROID) 175 MCG tablet 30 tablet 0     Sig: Take 1 tablet (175 mcg) by mouth daily at 2 pm.       Thyroid Protocol Failed - 10/2/2024  3:01 PM        Failed - Medication indicated for associated diagnosis     Medication is associated with one or more of the following diagnoses:  Hypothyroidism  Thyroid stimulating hormone suppression therapy  Thyroid cancer  Acquired atrophy of thyroid            Last Prescription Date:   8/26/24  Last Fill Qty/Refills:         30, R-0    Last Office Visit:              3/3/22   Future Office visit:                Routing refill request to provider for review/approval because:  Patient needs to be seen because it has been more than 1 year since last office visit.  Will route to unit 1 scheduling to call patient and help assist in scheduling appointment.    Mercedes Brown RN on 10/2/2024 at 3:03 PM

## 2024-10-03 RX ORDER — LEVOTHYROXINE SODIUM 175 UG/1
175 TABLET ORAL
Qty: 30 TABLET | Refills: 0 | Status: SHIPPED | OUTPATIENT
Start: 2024-10-03

## 2024-10-29 DIAGNOSIS — F33.1 MAJOR DEPRESSIVE DISORDER, RECURRENT EPISODE, MODERATE (H): ICD-10-CM

## 2024-10-29 DIAGNOSIS — F43.11 ACUTE POST-TRAUMATIC STRESS DISORDER: ICD-10-CM

## 2024-10-31 RX ORDER — LAMOTRIGINE 100 MG/1
100 TABLET ORAL AT BEDTIME
Qty: 30 TABLET | Refills: 0 | Status: SHIPPED | OUTPATIENT
Start: 2024-10-31

## 2024-10-31 NOTE — TELEPHONE ENCOUNTER
[id number] sent Rx request for the following:      Requested Prescriptions   Pending Prescriptions Disp Refills    lamoTRIgine (LAMICTAL) 100 MG tablet 30 tablet 1     Sig: Take 1 tablet (100 mg) by mouth at bedtime.       Anti-Seizure Meds Protocol  Failed - 10/31/2024  3:36 PM        Failed - PHQ-9 score less than 5 in past 6 months.     Please review last PHQ-9 score.           Failed - Review Authorizing provider's last note.      Refer to last progress notes: confirm request is for original authorizing provider (cannot be through other providers).          Failed - TERESA-7 score of less than 5 in past 6 months.     Please review last TERESA-7 score.           Passed - Normal ALT or AST on file in past 26 months     Recent Labs   Lab Test 02/27/24 2217 07/30/19  1155 01/05/18  1646   ALT 34   < >  --    GICHALT  --   --  17    < > = values in this interval not displayed.     Recent Labs   Lab Test 02/27/24 2217 07/30/19  1155 01/05/18  1646   AST 38   < >  --    GICHAST  --   --  21    < > = values in this interval not displayed.             Passed - Medication is active on med list        Passed - Has GFR on file in past 12 months and most recent value is normal        Passed - Recent (12 mo) or future (90 days) visit within the authorizing provider's specialty     The patient must have completed an in-person or virtual visit within the past 12 months or has a future visit scheduled within the next 90 days with the authorizing provider s specialty.  Urgent care and e-visits do not quality as an office visit for this protocol.          Passed - Medication indicated for associated diagnosis     Medication is associated with one or more of the following diagnoses:     Bipolar   Dementia   Depression   Epilepsy   Migraine   Seizure   Trigeminal Neuralgia   Cyclothymia          Passed - No active pregnancy on record        Passed - No positive pregnancy test in last 12 months             Last  Prescription Date:   8/22/24  Last Fill Qty/Refills:         30, R-1    Last Office Visit:              8/22/24   Future Office visit:            None    Per LOV note:  Return in about 4 weeks (around 9/19/2024), or if symptoms worsen or fail to improve, for Follow up, with me.     Unable to complete prescription refill per RN Medication Refill Policy.     Vince Escudero RN on 10/31/2024 at 3:38 PM

## 2024-11-21 DIAGNOSIS — F43.10 PTSD (POST-TRAUMATIC STRESS DISORDER): Primary | ICD-10-CM

## 2024-11-21 RX ORDER — BUPROPION HYDROCHLORIDE 300 MG/1
300 TABLET ORAL
Qty: 30 TABLET | Refills: 0 | Status: SHIPPED | OUTPATIENT
Start: 2024-11-21

## 2024-11-21 NOTE — TELEPHONE ENCOUNTER
[id number] sent Rx request for the following:      Requested Prescriptions   Pending Prescriptions Disp Refills    buPROPion (WELLBUTRIN XL) 300 MG 24 hr tablet       Sig: Take 1 tablet (300 mg) by mouth daily at 2 pm.       Rx Protocol Bupropion Failed - 11/21/2024  3:41 PM        Failed - Medication is indicated for associated diagnosis     Medication is associated with one or more of the following diagnoses:  Anxiety  Bipolar Disorder  Depression  Smoking Cessation  Adjustment disorder with mixed anxiety and depressed mood  Adjustment disorder with anxiety  Tobacco user  Adjustment disorder with anxious mood  Attention deficit hyperactivity disorder         Last Prescription Date:   historic  Last Fill Qty/Refills:         , R-    Last Office Visit:              8/22/24 psychology  Future Office visit:           none    Routing refill request to provider for review/approval because:  Medication is reported/historical    Kylie Richards RN on 11/21/2024 at 3:43 PM

## 2024-12-03 RX ORDER — LOSARTAN POTASSIUM AND HYDROCHLOROTHIAZIDE 25; 100 MG/1; MG/1
1 TABLET ORAL DAILY
OUTPATIENT
Start: 2024-12-03

## 2024-12-03 NOTE — TELEPHONE ENCOUNTER
[facility] of [place] sent Rx request for the following:      Requested Prescriptions   Pending Prescriptions Disp Refills    losartan-hydrochlorothiazide (HYZAAR) 100-25 MG tablet       Sig: Take 1 tablet by mouth daily.     Never prescribed for Pt. Pharmacy notified. Valorie Castorena RN .............. 12/3/2024  10:05 AM

## 2024-12-16 DIAGNOSIS — I10 BENIGN ESSENTIAL HYPERTENSION: Primary | ICD-10-CM

## 2024-12-16 NOTE — TELEPHONE ENCOUNTER
"Dr. Bloom,   Please review and confirm which Rx the patient should be on for HTN. We received a request for Hyzaar (below) twice now without history of an order. Patient currently has Zestoretic on her medication list. Pharmacy states they have been filling it for over a year since 2/13/24 via Dr. Bloom via [facility] with instructions to \"stop the plain hydrochlorothiazide tablets.\"   Jackelyn Garsia RN on 12/16/2024 at 10:31 AM      \"Requested Prescriptions  Pending Prescriptions Disp Refills  losartan-hydrochlorothiazide (HYZAAR) 100-25 MG tablet  Sig: Take 1 tablet by mouth daily.    Never prescribed for Pt. Pharmacy notified. Valorie Castorena RN .............. 12/3/2024 10:05 AM\"        Last Office Visit:              3/3/22 [facility]   Future Office visit:           none    Requested Prescriptions   Pending Prescriptions Disp Refills    losartan-hydrochlorothiazide (HYZAAR) 100-25 MG tablet       Sig: Take 1 tablet by mouth daily.       Angiotensin-II Receptors Failed - 12/16/2024 10:23 AM        Failed - Most recent blood pressure under 140/90 in past 12 months     BP Readings from Last 3 Encounters:   08/22/24 (!) 139/91   08/07/24 134/89   08/01/24 121/86       No data recorded            Failed - Medication is active on med list        Failed - Medication indicated for associated diagnosis     The medication is prescribed for one or more of the following conditions:    Chronic Kidney Disease (CDK)    Heart Failure (HF)    Diabetes, Nephropathy   Hypertension    Coronary Artery Disease (CAD)   Raynaud's Disease   Ischemic cardiomyopathy   Cardiomyopathy   Pulmonary Hypertension          Failed - Recent (12 mo) or future (90days) visit within the authorizing provider's specialty     The patient must have completed an in-person or virtual visit within the past 12 months or has a future visit scheduled within the next 90 days with the authorizing provider s specialty.  Urgent care and e-visits do not qualify as " an office visit for this protocol.         Diuretics (Including Combos) Protocol Failed - 12/16/2024 10:23 AM        Failed - Most recent blood pressure under 140/90 in past 12 months     BP Readings from Last 3 Encounters:   08/22/24 (!) 139/91   08/07/24 134/89   08/01/24 121/86       No data recorded            Failed - Medication is active on med list        Failed - Medication indicated for associated diagnosis     Medication is associated with one or more of the following diagnoses:     Edema   Hypertension   Heart Failure   Meniere's Disease   Bilateral localized swelling of lower limbs   Pulmonary Hypertension          Failed - Recent (12 mo) or future (90 days) visit within the authorizing provider's specialty     The patient must have completed an in-person or virtual visit within the past 12 months or has a future visit scheduled within the next 90 days with the authorizing provider s specialty.  Urgent care and e-visits do not qualify as an office visit for this protocol.       Last Prescription Date:   as above  Last Fill Qty/Refills:         not on current or historical medication lists

## 2024-12-17 NOTE — TELEPHONE ENCOUNTER
Save for next week per patient request.     Patient states she stopped lisinopril-hydrochlorothiazide because she developed a cough. Should this be added to her Allergy list? Patient states she has been going to the [facility] for a year and a half. She is currently driving but will either call back or drop off an updated medication list. She requests Hyzaar be filled but wants to wait until [facility] internet is back up rather than using a different pharmacy.   Jackelyn Garsia RN on 12/17/2024 at 12:56 PM

## 2024-12-17 NOTE — TELEPHONE ENCOUNTER
Please call patient and verify her Zestoretic dose.  I am fine using Zestoretic, it is likely cheaper than Hyzaar.    Looks like we need to get her medication list reviewed and updated.    Please call patient and verify/update her medication list.      Electronically signed by:  Jon Bloom MD  on December 16, 2024 8:59 PM

## 2024-12-24 RX ORDER — LOSARTAN POTASSIUM AND HYDROCHLOROTHIAZIDE 25; 100 MG/1; MG/1
1 TABLET ORAL DAILY
Qty: 90 TABLET | Refills: 4 | Status: SHIPPED | OUTPATIENT
Start: 2024-12-24

## 2024-12-30 DIAGNOSIS — F43.11 ACUTE POST-TRAUMATIC STRESS DISORDER: ICD-10-CM

## 2024-12-30 DIAGNOSIS — F33.1 MAJOR DEPRESSIVE DISORDER, RECURRENT EPISODE, MODERATE (H): ICD-10-CM

## 2024-12-30 DIAGNOSIS — R79.89 ABNORMAL TSH: ICD-10-CM

## 2024-12-30 DIAGNOSIS — F43.10 PTSD (POST-TRAUMATIC STRESS DISORDER): ICD-10-CM

## 2024-12-31 RX ORDER — LAMOTRIGINE 100 MG/1
100 TABLET ORAL AT BEDTIME
Qty: 30 TABLET | Refills: 0 | OUTPATIENT
Start: 2024-12-31

## 2024-12-31 RX ORDER — BUPROPION HYDROCHLORIDE 300 MG/1
300 TABLET ORAL
Qty: 30 TABLET | Refills: 0 | OUTPATIENT
Start: 2024-12-31

## 2024-12-31 NOTE — TELEPHONE ENCOUNTER
[facility] sent Rx request for the following:      Requested Prescriptions   Pending Prescriptions Disp Refills    buPROPion (WELLBUTRIN XL) 300 MG 24 hr tablet 30 tablet 0     Sig: Take 1 tablet (300 mg) by mouth daily at 2 pm.       Rx Protocol Bupropion Failed - 12/31/2024  3:29 PM        Failed - PHQ-9 score of less than 5 in past 6 months     Please review last PHQ-9 score.      Last Prescription Date:   11/21/24  Last Fill Qty/Refills:         30, R-0    Last Office Visit:              8/22/24           lamoTRIgine (LAMICTAL) 100 MG tablet 30 tablet 0     Sig: Take 1 tablet (100 mg) by mouth at bedtime.       There is no refill protocol information for this order   Last Prescription Date:   10/31/24  Last Fill Qty/Refills:         30, R-0    Last Office Visit:              8/22/24      Future Office visit:           none     Refused: Patient no longer under provider care.     Lorie Ortiz RN on 12/31/2024 at 3:37 PM

## 2025-01-02 RX ORDER — LEVOTHYROXINE SODIUM 175 UG/1
175 TABLET ORAL
Qty: 30 TABLET | Refills: 0 | Status: SHIPPED | OUTPATIENT
Start: 2025-01-02

## 2025-01-02 NOTE — TELEPHONE ENCOUNTER
[id number] sent Rx request for the following:      Requested Prescriptions   Pending Prescriptions Disp Refills    levothyroxine (SYNTHROID/LEVOTHROID) 175 MCG tablet 30 tablet 0     Sig: Take 1 tablet (175 mcg) by mouth daily at 2 pm.       Thyroid Protocol Failed - 1/2/2025 12:09 PM        Failed - Medication indicated for associated diagnosis     Last Prescription Date:   10/03/24  Last Fill Qty/Refills:         30, R-0    Last Office Visit:              8/22/24 psych    Future Office visit:           none     Unable to complete prescription refill per RN Medication Refill Policy. Routing to provider.   Last TSH checked 6/11/24.   Mary Ann Marin RN on 1/2/2025 at 12:17 PM

## 2025-01-02 NOTE — TELEPHONE ENCOUNTER
Authorized by: Malgorzata Carranza APRN CNP     Non-formulary For: Abnormal TSH    To pharmacy: 30-day supply of medication has been sent to pharmacy.  Please inform patient that they need a wellness visit scheduled for further refills of their medication.     Valorie Castorena RN .............. 1/2/2025  1:13 PM

## 2025-01-08 NOTE — TELEPHONE ENCOUNTER
Patient is being seen by [facility].  She stated that [facility] should not be sending refill requests for her.  She will talk to them about it.  She declined making an appointment at this time.  Loraine Buck on 1/8/2025 at 12:44 PM

## 2025-04-16 ENCOUNTER — LAB REQUISITION (OUTPATIENT)
Dept: LAB | Facility: OTHER | Age: 45
End: 2025-04-16

## 2025-04-16 DIAGNOSIS — E03.9 HYPOTHYROIDISM, UNSPECIFIED: ICD-10-CM

## 2025-04-16 LAB
ALBUMIN SERPL BCG-MCNC: 4.2 G/DL (ref 3.5–5.2)
ALP SERPL-CCNC: 151 U/L (ref 40–150)
ALT SERPL W P-5'-P-CCNC: 38 U/L (ref 0–50)
ANION GAP SERPL CALCULATED.3IONS-SCNC: 12 MMOL/L (ref 7–15)
AST SERPL W P-5'-P-CCNC: 34 U/L (ref 0–45)
BASOPHILS # BLD AUTO: 0.1 10E3/UL (ref 0–0.2)
BASOPHILS NFR BLD AUTO: 1 %
BILIRUB SERPL-MCNC: 0.6 MG/DL
BUN SERPL-MCNC: 9.5 MG/DL (ref 6–20)
CALCIUM SERPL-MCNC: 9.1 MG/DL (ref 8.8–10.4)
CHLORIDE SERPL-SCNC: 102 MMOL/L (ref 98–107)
CREAT SERPL-MCNC: 0.77 MG/DL (ref 0.51–0.95)
EGFRCR SERPLBLD CKD-EPI 2021: >90 ML/MIN/1.73M2
EOSINOPHIL # BLD AUTO: 0.2 10E3/UL (ref 0–0.7)
EOSINOPHIL NFR BLD AUTO: 2 %
ERYTHROCYTE [DISTWIDTH] IN BLOOD BY AUTOMATED COUNT: 11.7 % (ref 10–15)
GLUCOSE SERPL-MCNC: 116 MG/DL (ref 70–99)
HCO3 SERPL-SCNC: 27 MMOL/L (ref 22–29)
HCT VFR BLD AUTO: 39.5 % (ref 35–47)
HGB BLD-MCNC: 13.3 G/DL (ref 11.7–15.7)
IMM GRANULOCYTES # BLD: 0 10E3/UL
IMM GRANULOCYTES NFR BLD: 0 %
LYMPHOCYTES # BLD AUTO: 1.9 10E3/UL (ref 0.8–5.3)
LYMPHOCYTES NFR BLD AUTO: 22 %
MCH RBC QN AUTO: 31.8 PG (ref 26.5–33)
MCHC RBC AUTO-ENTMCNC: 33.7 G/DL (ref 31.5–36.5)
MCV RBC AUTO: 95 FL (ref 78–100)
MONOCYTES # BLD AUTO: 0.6 10E3/UL (ref 0–1.3)
MONOCYTES NFR BLD AUTO: 7 %
NEUTROPHILS # BLD AUTO: 5.6 10E3/UL (ref 1.6–8.3)
NEUTROPHILS NFR BLD AUTO: 67 %
NRBC # BLD AUTO: 0 10E3/UL
NRBC BLD AUTO-RTO: 0 /100
PLATELET # BLD AUTO: 373 10E3/UL (ref 150–450)
POTASSIUM SERPL-SCNC: 3.9 MMOL/L (ref 3.4–5.3)
PROT SERPL-MCNC: 7.2 G/DL (ref 6.4–8.3)
RBC # BLD AUTO: 4.18 10E6/UL (ref 3.8–5.2)
SODIUM SERPL-SCNC: 141 MMOL/L (ref 135–145)
TSH SERPL DL<=0.005 MIU/L-ACNC: 1.04 UIU/ML (ref 0.3–4.2)
WBC # BLD AUTO: 8.3 10E3/UL (ref 4–11)

## 2025-04-16 PROCEDURE — 85041 AUTOMATED RBC COUNT: CPT | Performed by: INTERNAL MEDICINE

## 2025-04-16 PROCEDURE — 84155 ASSAY OF PROTEIN SERUM: CPT | Performed by: INTERNAL MEDICINE

## 2025-04-16 PROCEDURE — 84132 ASSAY OF SERUM POTASSIUM: CPT | Performed by: INTERNAL MEDICINE

## 2025-04-16 PROCEDURE — 85004 AUTOMATED DIFF WBC COUNT: CPT | Performed by: INTERNAL MEDICINE

## 2025-04-16 PROCEDURE — 36415 COLL VENOUS BLD VENIPUNCTURE: CPT | Performed by: INTERNAL MEDICINE

## 2025-04-16 PROCEDURE — 84443 ASSAY THYROID STIM HORMONE: CPT | Performed by: INTERNAL MEDICINE

## 2025-07-13 ENCOUNTER — HEALTH MAINTENANCE LETTER (OUTPATIENT)
Age: 45
End: 2025-07-13

## (undated) RX ORDER — METHYLPREDNISOLONE ACETATE 80 MG/ML
INJECTION, SUSPENSION INTRA-ARTICULAR; INTRALESIONAL; INTRAMUSCULAR; SOFT TISSUE
Status: DISPENSED
Start: 2019-07-30

## (undated) RX ORDER — KETOROLAC TROMETHAMINE 15 MG/ML
INJECTION, SOLUTION INTRAMUSCULAR; INTRAVENOUS
Status: DISPENSED
Start: 2024-07-06

## (undated) RX ORDER — LIDOCAINE HYDROCHLORIDE 10 MG/ML
INJECTION, SOLUTION INFILTRATION; PERINEURAL
Status: DISPENSED
Start: 2022-03-03

## (undated) RX ORDER — LIDOCAINE HYDROCHLORIDE 10 MG/ML
INJECTION, SOLUTION INFILTRATION; PERINEURAL
Status: DISPENSED
Start: 2019-01-10

## (undated) RX ORDER — TETRACAINE HYDROCHLORIDE 5 MG/ML
SOLUTION OPHTHALMIC
Status: DISPENSED
Start: 2019-01-31

## (undated) RX ORDER — LIDOCAINE HYDROCHLORIDE 10 MG/ML
INJECTION, SOLUTION INFILTRATION; PERINEURAL
Status: DISPENSED
Start: 2018-10-09

## (undated) RX ORDER — LIDOCAINE HYDROCHLORIDE 10 MG/ML
INJECTION, SOLUTION INFILTRATION; PERINEURAL
Status: DISPENSED
Start: 2019-07-30

## (undated) RX ORDER — LIDOCAINE 4 G/G
PATCH TOPICAL
Status: DISPENSED
Start: 2024-07-06

## (undated) RX ORDER — METHYLPREDNISOLONE ACETATE 80 MG/ML
INJECTION, SUSPENSION INTRA-ARTICULAR; INTRALESIONAL; INTRAMUSCULAR; SOFT TISSUE
Status: DISPENSED
Start: 2020-01-22

## (undated) RX ORDER — SODIUM CHLORIDE 9 MG/ML
INJECTION, SOLUTION INTRAVENOUS
Status: DISPENSED
Start: 2019-10-19

## (undated) RX ORDER — ACETAMINOPHEN 500 MG
TABLET ORAL
Status: DISPENSED
Start: 2024-07-06

## (undated) RX ORDER — LIDOCAINE HYDROCHLORIDE 10 MG/ML
INJECTION, SOLUTION INFILTRATION; PERINEURAL
Status: DISPENSED
Start: 2020-01-22

## (undated) RX ORDER — METHYLPREDNISOLONE ACETATE 80 MG/ML
INJECTION, SUSPENSION INTRA-ARTICULAR; INTRALESIONAL; INTRAMUSCULAR; SOFT TISSUE
Status: DISPENSED
Start: 2020-07-03

## (undated) RX ORDER — METHYLPREDNISOLONE ACETATE 80 MG/ML
INJECTION, SUSPENSION INTRA-ARTICULAR; INTRALESIONAL; INTRAMUSCULAR; SOFT TISSUE
Status: DISPENSED
Start: 2022-03-03

## (undated) RX ORDER — LIDOCAINE HYDROCHLORIDE 10 MG/ML
INJECTION, SOLUTION INFILTRATION; PERINEURAL
Status: DISPENSED
Start: 2019-04-23

## (undated) RX ORDER — METHYLPREDNISOLONE ACETATE 80 MG/ML
INJECTION, SUSPENSION INTRA-ARTICULAR; INTRALESIONAL; INTRAMUSCULAR; SOFT TISSUE
Status: DISPENSED
Start: 2018-10-09

## (undated) RX ORDER — METHYLPREDNISOLONE ACETATE 80 MG/ML
INJECTION, SUSPENSION INTRA-ARTICULAR; INTRALESIONAL; INTRAMUSCULAR; SOFT TISSUE
Status: DISPENSED
Start: 2019-04-23

## (undated) RX ORDER — KETOROLAC TROMETHAMINE 15 MG/ML
INJECTION, SOLUTION INTRAMUSCULAR; INTRAVENOUS
Status: DISPENSED
Start: 2019-10-19

## (undated) RX ORDER — LIDOCAINE HYDROCHLORIDE 10 MG/ML
INJECTION, SOLUTION INFILTRATION; PERINEURAL
Status: DISPENSED
Start: 2020-10-26

## (undated) RX ORDER — LIDOCAINE HYDROCHLORIDE 10 MG/ML
INJECTION, SOLUTION INFILTRATION; PERINEURAL
Status: DISPENSED
Start: 2020-07-03

## (undated) RX ORDER — LIDOCAINE HYDROCHLORIDE 10 MG/ML
INJECTION, SOLUTION INFILTRATION; PERINEURAL
Status: DISPENSED
Start: 2018-06-28

## (undated) RX ORDER — METHYLPREDNISOLONE ACETATE 80 MG/ML
INJECTION, SUSPENSION INTRA-ARTICULAR; INTRALESIONAL; INTRAMUSCULAR; SOFT TISSUE
Status: DISPENSED
Start: 2019-01-10

## (undated) RX ORDER — METHYLPREDNISOLONE ACETATE 80 MG/ML
INJECTION, SUSPENSION INTRA-ARTICULAR; INTRALESIONAL; INTRAMUSCULAR; SOFT TISSUE
Status: DISPENSED
Start: 2020-10-26

## (undated) RX ORDER — METHYLPREDNISOLONE ACETATE 80 MG/ML
INJECTION, SUSPENSION INTRA-ARTICULAR; INTRALESIONAL; INTRAMUSCULAR; SOFT TISSUE
Status: DISPENSED
Start: 2018-06-28

## (undated) RX ORDER — LIDOCAINE HYDROCHLORIDE 10 MG/ML
INJECTION, SOLUTION INFILTRATION; PERINEURAL
Status: DISPENSED
Start: 2021-03-30

## (undated) RX ORDER — METHYLPREDNISOLONE ACETATE 80 MG/ML
INJECTION, SUSPENSION INTRA-ARTICULAR; INTRALESIONAL; INTRAMUSCULAR; SOFT TISSUE
Status: DISPENSED
Start: 2021-03-30